# Patient Record
Sex: FEMALE | Race: WHITE | Employment: OTHER | URBAN - METROPOLITAN AREA
[De-identification: names, ages, dates, MRNs, and addresses within clinical notes are randomized per-mention and may not be internally consistent; named-entity substitution may affect disease eponyms.]

---

## 2017-01-24 ENCOUNTER — OFFICE VISIT (OUTPATIENT)
Dept: HEMATOLOGY/ONCOLOGY | Facility: HOSPITAL | Age: 67
End: 2017-01-24
Attending: INTERNAL MEDICINE
Payer: MEDICARE

## 2017-01-24 VITALS
HEART RATE: 61 BPM | BODY MASS INDEX: 21.26 KG/M2 | OXYGEN SATURATION: 96 % | WEIGHT: 117 LBS | RESPIRATION RATE: 18 BRPM | TEMPERATURE: 98 F | HEIGHT: 62.01 IN | DIASTOLIC BLOOD PRESSURE: 66 MMHG | SYSTOLIC BLOOD PRESSURE: 140 MMHG

## 2017-01-24 DIAGNOSIS — R76.8 ELEVATED SERUM IMMUNOGLOBULIN FREE LIGHT CHAINS: ICD-10-CM

## 2017-01-24 DIAGNOSIS — D69.6 THROMBOCYTOPENIA (HCC): Primary | ICD-10-CM

## 2017-01-24 DIAGNOSIS — R76.8 ELEVATED SERUM IMMUNOGLOBULIN FREE LIGHT CHAIN LEVEL: ICD-10-CM

## 2017-01-24 DIAGNOSIS — D72.819 LEUKOPENIA, UNSPECIFIED TYPE: ICD-10-CM

## 2017-01-24 LAB
ALBUMIN SERPL-MCNC: 3.9 G/DL (ref 3.5–4.8)
ALP LIVER SERPL-CCNC: 98 U/L (ref 55–142)
ALT SERPL-CCNC: 34 U/L (ref 14–54)
AST SERPL-CCNC: 46 U/L (ref 15–41)
BASOPHILS # BLD AUTO: 0.01 X10(3) UL (ref 0–0.1)
BASOPHILS NFR BLD AUTO: 0.5 %
BILIRUB SERPL-MCNC: 1.4 MG/DL (ref 0.1–2)
BUN BLD-MCNC: 14 MG/DL (ref 8–20)
CALCIUM BLD-MCNC: 10.2 MG/DL (ref 8.3–10.3)
CHLORIDE: 102 MMOL/L (ref 101–111)
CO2: 33 MMOL/L (ref 22–32)
CREAT BLD-MCNC: 1.06 MG/DL (ref 0.55–1.02)
EOSINOPHIL # BLD AUTO: 0.06 X10(3) UL (ref 0–0.3)
EOSINOPHIL NFR BLD AUTO: 2.7 %
ERYTHROCYTE [DISTWIDTH] IN BLOOD BY AUTOMATED COUNT: 11.8 % (ref 11.5–16)
GLUCOSE BLD-MCNC: 91 MG/DL (ref 70–99)
HCT VFR BLD AUTO: 39.6 % (ref 34–50)
HGB BLD-MCNC: 13.9 G/DL (ref 12–16)
IMMATURE GRANULOCYTE COUNT: 0.01 X10(3) UL (ref 0–1)
IMMATURE GRANULOCYTE RATIO %: 0.5 %
LYMPHOCYTES # BLD AUTO: 0.63 X10(3) UL (ref 0.9–4)
LYMPHOCYTES NFR BLD AUTO: 28.5 %
M PROTEIN MFR SERPL ELPH: 8.4 G/DL (ref 6.1–8.3)
MCH RBC QN AUTO: 33.5 PG (ref 27–33.2)
MCHC RBC AUTO-ENTMCNC: 35.1 G/DL (ref 31–37)
MCV RBC AUTO: 95.4 FL (ref 81–100)
MONOCYTES # BLD AUTO: 0.17 X10(3) UL (ref 0.1–0.6)
MONOCYTES NFR BLD AUTO: 7.7 %
NEUTROPHIL ABS PRELIM: 1.33 X10 (3) UL (ref 1.3–6.7)
NEUTROPHILS # BLD AUTO: 1.33 X10(3) UL (ref 1.3–6.7)
NEUTROPHILS NFR BLD AUTO: 60.1 %
PLATELET # BLD AUTO: 111 10(3)UL (ref 150–450)
POTASSIUM SERPL-SCNC: 4 MMOL/L (ref 3.6–5.1)
RBC # BLD AUTO: 4.15 X10(6)UL (ref 3.8–5.1)
RED CELL DISTRIBUTION WIDTH-SD: 40.7 FL (ref 35.1–46.3)
SODIUM SERPL-SCNC: 139 MMOL/L (ref 136–144)
WBC # BLD AUTO: 2.2 X10(3) UL (ref 4–13)

## 2017-01-24 PROCEDURE — 99213 OFFICE O/P EST LOW 20 MIN: CPT | Performed by: INTERNAL MEDICINE

## 2017-01-26 LAB
A/G RATIO: 1.21
ALBUMIN, SERUM: 4.54 G/DL (ref 3.5–4.8)
ALPHA-1 GLOBULIN: 0.22 G/DL (ref 0.1–0.3)
ALPHA-2 GLOBULIN: 0.76 G/DL (ref 0.6–1)
BETA GLOBULIN: 0.71 G/DL (ref 0.7–1.2)
GAMMA GLOBULIN: 2.08 G/DL (ref 0.6–1.6)
KAPPA FREE LIGHT CHAIN: 5.93 MG/DL (ref 0.33–1.94)
KAPPA/LAMBDA FLC RATIO: 1.75 (ref 0.26–1.65)
LAMBDA FREE LIGHT CHAIN: 3.39 MG/DL (ref 0.57–2.63)
TOTAL PROTEIN,SERUM: 8.3 G/DL (ref 6.1–8.3)

## 2017-01-30 NOTE — PROGRESS NOTES
Cancer Center Progress Note  Patient Name: Novella Curling   YOB: 1950   Medical Record Number: [id number]     Attending Physician: Michael Bueno M.D. Date of Visit: 1/24/17     Chief Complaint:  Patient presents with:   Follow COLONOSCOPY        , 63,  0       Family History:  Family History   Problem Relation Age of Onset   • heart disease[other] [OTHER] Father    • htn[other] [OTHER] Father    • cabg[other] [OTHER] Father    • Heart Disorder Father    • Hypertensi every 6 (six) hours as needed for Pain., Disp: , Rfl:     Allergies:  No Known Allergies     Review of Systems:    Constitutional No fevers, chills, night sweats, excessive fatigue or weight loss. Eyes No significant visual difficulties. No diplopia.  No of our discussions today.      Laboratory:    Recent Labs   01/24/17  1425   RBC 4.15   HGB 13.9   HCT 39.6   MCV 95.4   MCH 33.5 H   MCHC 35.1   RDW 11.8   NEPRELIM 1.33   WBC 2.2 L   .0 L       Recent Labs   01/24/17  1425   GLU 91   BUN 14   CREAT

## 2017-02-25 ENCOUNTER — LAB ENCOUNTER (OUTPATIENT)
Dept: LAB | Facility: HOSPITAL | Age: 67
End: 2017-02-25
Attending: INTERNAL MEDICINE
Payer: MEDICARE

## 2017-02-25 DIAGNOSIS — R76.8 ELEVATED SERUM IMMUNOGLOBULIN FREE LIGHT CHAINS: ICD-10-CM

## 2017-02-25 DIAGNOSIS — D72.819 LEUKOPENIA, UNSPECIFIED TYPE: ICD-10-CM

## 2017-02-25 DIAGNOSIS — R76.8 ELEVATED SERUM IMMUNOGLOBULIN FREE LIGHT CHAIN LEVEL: ICD-10-CM

## 2017-02-25 DIAGNOSIS — D69.6 THROMBOCYTOPENIA (HCC): ICD-10-CM

## 2017-02-25 LAB
ALBUMIN SERPL-MCNC: 3.9 G/DL (ref 3.5–4.8)
ALP LIVER SERPL-CCNC: 111 U/L (ref 55–142)
ALT SERPL-CCNC: 38 U/L (ref 14–54)
AST SERPL-CCNC: 50 U/L (ref 15–41)
BASOPHILS # BLD AUTO: 0.02 X10(3) UL (ref 0–0.1)
BASOPHILS NFR BLD AUTO: 0.9 %
BILIRUB SERPL-MCNC: 1.2 MG/DL (ref 0.1–2)
BUN BLD-MCNC: 10 MG/DL (ref 8–20)
CALCIUM BLD-MCNC: 9.2 MG/DL (ref 8.3–10.3)
CHLORIDE: 104 MMOL/L (ref 101–111)
CO2: 29 MMOL/L (ref 22–32)
CREAT BLD-MCNC: 0.98 MG/DL (ref 0.55–1.02)
EOSINOPHIL # BLD AUTO: 0.08 X10(3) UL (ref 0–0.3)
EOSINOPHIL NFR BLD AUTO: 3.8 %
ERYTHROCYTE [DISTWIDTH] IN BLOOD BY AUTOMATED COUNT: 11.9 % (ref 11.5–16)
GLUCOSE BLD-MCNC: 78 MG/DL (ref 70–99)
HCT VFR BLD AUTO: 40.4 % (ref 34–50)
HGB BLD-MCNC: 14.1 G/DL (ref 12–16)
IMMATURE GRANULOCYTE COUNT: 0.01 X10(3) UL (ref 0–1)
IMMATURE GRANULOCYTE RATIO %: 0.5 %
IMMUNOGLOBULIN A: 527 MG/DL (ref 70–312)
IMMUNOGLOBULIN G: 1860 MG/DL (ref 791–1643)
IMMUNOGLOBULIN M: 277 MG/DL (ref 43–279)
LYMPHOCYTES # BLD AUTO: 0.63 X10(3) UL (ref 0.9–4)
LYMPHOCYTES NFR BLD AUTO: 29.9 %
M PROTEIN MFR SERPL ELPH: 8.4 G/DL (ref 6.1–8.3)
MCH RBC QN AUTO: 33.9 PG (ref 27–33.2)
MCHC RBC AUTO-ENTMCNC: 34.9 G/DL (ref 31–37)
MCV RBC AUTO: 97.1 FL (ref 81–100)
MONOCYTES # BLD AUTO: 0.23 X10(3) UL (ref 0.1–0.6)
MONOCYTES NFR BLD AUTO: 10.9 %
NEUTROPHIL ABS PRELIM: 1.14 X10 (3) UL (ref 1.3–6.7)
NEUTROPHILS # BLD AUTO: 1.14 X10(3) UL (ref 1.3–6.7)
NEUTROPHILS NFR BLD AUTO: 54 %
PLATELET # BLD AUTO: 106 10(3)UL (ref 150–450)
POTASSIUM SERPL-SCNC: 3.8 MMOL/L (ref 3.6–5.1)
RBC # BLD AUTO: 4.16 X10(6)UL (ref 3.8–5.1)
RED CELL DISTRIBUTION WIDTH-SD: 42.2 FL (ref 35.1–46.3)
SODIUM SERPL-SCNC: 139 MMOL/L (ref 136–144)
WBC # BLD AUTO: 2.1 X10(3) UL (ref 4–13)

## 2017-02-25 PROCEDURE — 83883 ASSAY NEPHELOMETRY NOT SPEC: CPT

## 2017-02-25 PROCEDURE — 36415 COLL VENOUS BLD VENIPUNCTURE: CPT

## 2017-02-25 PROCEDURE — 86334 IMMUNOFIX E-PHORESIS SERUM: CPT

## 2017-02-25 PROCEDURE — 84165 PROTEIN E-PHORESIS SERUM: CPT

## 2017-02-25 PROCEDURE — 82784 ASSAY IGA/IGD/IGG/IGM EACH: CPT

## 2017-02-25 PROCEDURE — 85025 COMPLETE CBC W/AUTO DIFF WBC: CPT

## 2017-02-25 PROCEDURE — 80053 COMPREHEN METABOLIC PANEL: CPT

## 2017-03-02 LAB
A/G RATIO: 1.3
ALBUMIN, SERUM: 4.46 G/DL (ref 3.5–4.8)
ALPHA-1 GLOBULIN: 0.17 G/DL (ref 0.1–0.3)
ALPHA-2 GLOBULIN: 0.78 G/DL (ref 0.6–1)
BETA GLOBULIN: 0.55 G/DL (ref 0.7–1.2)
GAMMA GLOBULIN: 1.93 G/DL (ref 0.6–1.6)
KAPPA FREE LIGHT CHAIN: 7.16 MG/DL (ref 0.33–1.94)
KAPPA/LAMBDA FLC RATIO: 1.73 (ref 0.26–1.65)
LAMBDA FREE LIGHT CHAIN: 4.15 MG/DL (ref 0.57–2.63)
TOTAL PROTEIN,SERUM: 7.9 G/DL (ref 6.1–8.3)

## 2017-03-09 ENCOUNTER — LAB ENCOUNTER (OUTPATIENT)
Dept: LAB | Facility: HOSPITAL | Age: 67
End: 2017-03-09
Attending: INTERNAL MEDICINE
Payer: MEDICARE

## 2017-03-09 DIAGNOSIS — M81.0 OSTEOPOROSIS: Primary | ICD-10-CM

## 2017-03-09 LAB
25-HYDROXYVITAMIN D (TOTAL): 65.4 NG/ML (ref 30–100)
AFP TUMOR MARKER: 8.6 NG/ML (ref ?–8)
INR BLD: 1.12 (ref 0.89–1.11)
PSA SERPL DL<=0.01 NG/ML-MCNC: 14.5 SECONDS (ref 12–14.3)

## 2017-03-09 PROCEDURE — 36415 COLL VENOUS BLD VENIPUNCTURE: CPT

## 2017-03-09 PROCEDURE — 85610 PROTHROMBIN TIME: CPT

## 2017-03-09 PROCEDURE — 82306 VITAMIN D 25 HYDROXY: CPT

## 2017-03-09 PROCEDURE — 82105 ALPHA-FETOPROTEIN SERUM: CPT

## 2017-03-28 ENCOUNTER — TELEPHONE (OUTPATIENT)
Dept: INTERNAL MEDICINE CLINIC | Facility: CLINIC | Age: 67
End: 2017-03-28

## 2017-03-30 ENCOUNTER — HOSPITAL ENCOUNTER (OUTPATIENT)
Dept: GENERAL RADIOLOGY | Age: 67
Discharge: HOME | End: 2017-03-30
Attending: INTERNAL MEDICINE
Payer: MEDICARE

## 2017-03-30 ENCOUNTER — OFFICE VISIT (OUTPATIENT)
Dept: INTERNAL MEDICINE CLINIC | Facility: CLINIC | Age: 67
End: 2017-03-30

## 2017-03-30 VITALS
SYSTOLIC BLOOD PRESSURE: 126 MMHG | WEIGHT: 115.81 LBS | RESPIRATION RATE: 16 BRPM | BODY MASS INDEX: 22.44 KG/M2 | DIASTOLIC BLOOD PRESSURE: 74 MMHG | HEART RATE: 71 BPM | OXYGEN SATURATION: 99 % | HEIGHT: 60.25 IN | TEMPERATURE: 98 F

## 2017-03-30 DIAGNOSIS — M25.511 ACUTE PAIN OF RIGHT SHOULDER: Primary | ICD-10-CM

## 2017-03-30 DIAGNOSIS — Z00.00 ENCOUNTER FOR ANNUAL HEALTH EXAMINATION: ICD-10-CM

## 2017-03-30 DIAGNOSIS — Z23 NEED FOR VACCINATION: ICD-10-CM

## 2017-03-30 DIAGNOSIS — H53.8 BLURRED VISION: ICD-10-CM

## 2017-03-30 DIAGNOSIS — M25.511 ACUTE PAIN OF RIGHT SHOULDER: ICD-10-CM

## 2017-03-30 DIAGNOSIS — Z11.59 NEED FOR HEPATITIS C SCREENING TEST: ICD-10-CM

## 2017-03-30 DIAGNOSIS — Z13.6 SCREENING FOR CARDIOVASCULAR CONDITION: ICD-10-CM

## 2017-03-30 DIAGNOSIS — Z12.31 VISIT FOR SCREENING MAMMOGRAM: ICD-10-CM

## 2017-03-30 PROCEDURE — G0402 INITIAL PREVENTIVE EXAM: HCPCS | Performed by: INTERNAL MEDICINE

## 2017-03-30 PROCEDURE — 90670 PCV13 VACCINE IM: CPT | Performed by: INTERNAL MEDICINE

## 2017-03-30 PROCEDURE — 73030 X-RAY EXAM OF SHOULDER: CPT

## 2017-03-30 PROCEDURE — G0009 ADMIN PNEUMOCOCCAL VACCINE: HCPCS | Performed by: INTERNAL MEDICINE

## 2017-03-30 PROCEDURE — 99213 OFFICE O/P EST LOW 20 MIN: CPT | Performed by: INTERNAL MEDICINE

## 2017-03-30 NOTE — PATIENT INSTRUCTIONS
Barbara Phoenix's SCREENING SCHEDULE   Tests on this list are recommended by your physician but may not be covered, or covered at this frequency, by your insurer. Please check with your insurance carrier before scheduling to verify coverage.    AAKASH lifetime   • Anyone with a family history    Colorectal Cancer Screening  Covered up to Age 76     Colonoscopy Screen   Covered every 10 years- more often if abnormal Colonoscopy,10 Years due on 02/16/2020 Update Health Maintenance if applicable    Flex Si Pneumococcal 13 (Prevnar)  Covered Once after 65 No orders found for this or any previous visit. today Please get once after your 65th birthday    Pneumococcal 23 (Pneumovax)  Covered Once after 65 No orders found for this or any previous visit. next rachael

## 2017-04-04 ENCOUNTER — HOSPITAL ENCOUNTER (OUTPATIENT)
Dept: PHYSICAL THERAPY | Facility: HOSPITAL | Age: 67
Setting detail: THERAPIES SERIES
Discharge: HOME | End: 2017-04-04
Attending: INTERNAL MEDICINE
Payer: MEDICARE

## 2017-04-04 DIAGNOSIS — M25.511 ACUTE PAIN OF RIGHT SHOULDER: Primary | ICD-10-CM

## 2017-04-04 PROCEDURE — 97110 THERAPEUTIC EXERCISES: CPT

## 2017-04-04 PROCEDURE — 97162 PT EVAL MOD COMPLEX 30 MIN: CPT

## 2017-04-04 NOTE — PROGRESS NOTES
UPPER EXTREMITY EVALUATION:   Referring Physician: Dr. Jonathan Rider  Diagnosis: R shoulder pain     Date of Service: 4/4/2017     PATIENT SUMMARY   Carron Hatchet is a 74year old y/o female who presents to therapy today with complaints of R shoulder pain.   Courtney to palpation anterior R shoulder  Sensation: SILT    AROM:   Shoulder    Flexion: R 128; L 160  Abduction: R 140; L 170  ER: R 45; L 75  IR: 17 cm difference reaching behind back      Accessory motion: R shoulder hypomobile to posterior and inferior glide this course of care. Thank you for your referral. Please co-sign or sign and return this letter via fax as soon as possible to [phone].  If you have any questions, please contact me at Dept: [phone]    Sincerely,  Electronically signed by thera

## 2017-04-06 ENCOUNTER — HOSPITAL ENCOUNTER (OUTPATIENT)
Dept: PHYSICAL THERAPY | Facility: HOSPITAL | Age: 67
Setting detail: THERAPIES SERIES
Discharge: HOME | End: 2017-04-06
Attending: INTERNAL MEDICINE
Payer: MEDICARE

## 2017-04-06 ENCOUNTER — APPOINTMENT (OUTPATIENT)
Dept: LAB | Facility: HOSPITAL | Age: 67
End: 2017-04-06
Attending: INTERNAL MEDICINE
Payer: MEDICARE

## 2017-04-06 DIAGNOSIS — Z11.59 NEED FOR HEPATITIS C SCREENING TEST: ICD-10-CM

## 2017-04-06 DIAGNOSIS — Z13.6 SCREENING FOR CARDIOVASCULAR CONDITION: ICD-10-CM

## 2017-04-06 PROCEDURE — 36415 COLL VENOUS BLD VENIPUNCTURE: CPT

## 2017-04-06 PROCEDURE — 86803 HEPATITIS C AB TEST: CPT

## 2017-04-06 PROCEDURE — 97140 MANUAL THERAPY 1/> REGIONS: CPT

## 2017-04-06 PROCEDURE — 80061 LIPID PANEL: CPT

## 2017-04-06 PROCEDURE — 97110 THERAPEUTIC EXERCISES: CPT

## 2017-04-06 NOTE — PROGRESS NOTES
Dx: R shoulder pain         Authorized # of Visits:           Next MD visit: none scheduled  Fall Risk: standard         Precautions: n/a             Subjective: Patient reports she had a painful incident on Tuesday when she reached laterally in the car an

## 2017-04-10 ENCOUNTER — TELEPHONE (OUTPATIENT)
Dept: INTERNAL MEDICINE CLINIC | Facility: CLINIC | Age: 67
End: 2017-04-10

## 2017-04-10 NOTE — TELEPHONE ENCOUNTER
Incoming (mail or fax):  fax  Received from:  Eber Omalley  Documentation given to:  Dr Miranda Suggs

## 2017-04-11 ENCOUNTER — HOSPITAL ENCOUNTER (OUTPATIENT)
Dept: PHYSICAL THERAPY | Facility: HOSPITAL | Age: 67
Setting detail: THERAPIES SERIES
Discharge: HOME | End: 2017-04-11
Attending: INTERNAL MEDICINE
Payer: MEDICARE

## 2017-04-11 PROCEDURE — 97140 MANUAL THERAPY 1/> REGIONS: CPT

## 2017-04-11 PROCEDURE — 97110 THERAPEUTIC EXERCISES: CPT

## 2017-04-11 NOTE — PROGRESS NOTES
Dx: R shoulder pain         Authorized # of Visits:           Next MD visit: none scheduled  Fall Risk: standard         Precautions: n/a             Subjective: Patient 8' late for appointment.   Patient reports some slight improvement in her symptoms bot x20  SB circles on wall 2x30 sec each way                                   Skilled Services: HEP in bold.     Charges: manual x1, therex x1       Total Timed Treatment: 35 min  Total Treatment Time: 35 min

## 2017-04-13 ENCOUNTER — HOSPITAL ENCOUNTER (OUTPATIENT)
Dept: PHYSICAL THERAPY | Facility: HOSPITAL | Age: 67
Setting detail: THERAPIES SERIES
Discharge: HOME | End: 2017-04-13
Attending: INTERNAL MEDICINE
Payer: MEDICARE

## 2017-04-13 PROCEDURE — 97140 MANUAL THERAPY 1/> REGIONS: CPT

## 2017-04-13 PROCEDURE — 97110 THERAPEUTIC EXERCISES: CPT

## 2017-04-13 NOTE — PROGRESS NOTES
Dx: R shoulder pain         Authorized # of Visits:           Next MD visit: none scheduled  Fall Risk: standard         Precautions: n/a             Subjective: Patient reports no major change.     Objective:   Pain located in R anterior shoulder  Trigger retraction and BL shoulder extensions 3x20 each Red tband ex: scapular retraction and BL shoulder extensions 3x20 each Continued at home        SB roll up wall and stretch x20  SB circles on wall 2x30 sec each way SB roll up wall and stretch x20  SB Platinum

## 2017-04-17 ENCOUNTER — TELEPHONE (OUTPATIENT)
Dept: INTERNAL MEDICINE CLINIC | Facility: CLINIC | Age: 67
End: 2017-04-17

## 2017-04-17 NOTE — TELEPHONE ENCOUNTER
Incoming (mail or fax):  fax  Received from:  Mariela Mistry  Documentation given to:  Dr Marcus Esteban

## 2017-04-18 ENCOUNTER — HOSPITAL ENCOUNTER (OUTPATIENT)
Dept: PHYSICAL THERAPY | Facility: HOSPITAL | Age: 67
Setting detail: THERAPIES SERIES
Discharge: HOME | End: 2017-04-18
Attending: INTERNAL MEDICINE
Payer: MEDICARE

## 2017-04-18 PROCEDURE — 97110 THERAPEUTIC EXERCISES: CPT

## 2017-04-18 PROCEDURE — 97140 MANUAL THERAPY 1/> REGIONS: CPT

## 2017-04-18 NOTE — PROGRESS NOTES
Dx: R shoulder pain         Authorized # of Visits:           Next MD visit: none scheduled  Fall Risk: standard         Precautions: n/a             Subjective: Patient reports she can tell her shoulder is improving, has less aching and range is improving Serratus press x20 Supine AAROM cane flexion and abduction x30 each  Cane Serratus press x20 Supine AAROM cane flexion x30  Cane Serratus press x30      Red tband ex: scapular retraction and BL shoulder extensions 3x20 each Red tband ex: scapular retractio

## 2017-04-21 ENCOUNTER — APPOINTMENT (OUTPATIENT)
Dept: PHYSICAL THERAPY | Facility: HOSPITAL | Age: 67
End: 2017-04-21
Attending: INTERNAL MEDICINE
Payer: MEDICARE

## 2017-04-25 ENCOUNTER — HOSPITAL ENCOUNTER (OUTPATIENT)
Dept: PHYSICAL THERAPY | Facility: HOSPITAL | Age: 67
Setting detail: THERAPIES SERIES
Discharge: HOME | End: 2017-04-25
Attending: INTERNAL MEDICINE
Payer: MEDICARE

## 2017-04-25 PROCEDURE — 97110 THERAPEUTIC EXERCISES: CPT

## 2017-04-25 PROCEDURE — 97140 MANUAL THERAPY 1/> REGIONS: CPT

## 2017-04-25 NOTE — PROGRESS NOTES
Dx: R shoulder pain         Authorized # of Visits:           Next MD visit: none scheduled  Fall Risk: standard         Precautions: n/a             Subjective: Patient reports she does still have pain with reaching out to the side, but reaching overhead grade III  GH distraction   x15' total  Manual thoracic P-A grade II-III T2-6     Supine AAROM cane flexion and abduction x20 each Supine AAROM cane flexion and abduction x30 each  Cane Serratus press x20 Supine AAROM cane flexion and abduction x30 each  C

## 2017-04-26 ENCOUNTER — HOSPITAL ENCOUNTER (OUTPATIENT)
Dept: MRI IMAGING | Facility: HOSPITAL | Age: 67
Discharge: HOME | End: 2017-04-26
Attending: INTERNAL MEDICINE
Payer: MEDICARE

## 2017-04-26 DIAGNOSIS — K74.3 PRIMARY BILIARY CIRRHOSIS (HCC): ICD-10-CM

## 2017-04-26 DIAGNOSIS — R77.2 ELEVATED AFP: ICD-10-CM

## 2017-04-26 PROCEDURE — A9581 GADOXETATE DISODIUM INJ: HCPCS | Performed by: INTERNAL MEDICINE

## 2017-04-26 PROCEDURE — 74183 MRI ABD W/O CNTR FLWD CNTR: CPT

## 2017-04-28 ENCOUNTER — HOSPITAL ENCOUNTER (OUTPATIENT)
Dept: PHYSICAL THERAPY | Facility: HOSPITAL | Age: 67
Setting detail: THERAPIES SERIES
Discharge: HOME | End: 2017-04-28
Attending: INTERNAL MEDICINE
Payer: MEDICARE

## 2017-04-28 PROCEDURE — 97140 MANUAL THERAPY 1/> REGIONS: CPT

## 2017-04-28 PROCEDURE — 97110 THERAPEUTIC EXERCISES: CPT

## 2017-04-28 NOTE — PROGRESS NOTES
Dx: R shoulder pain         Authorized # of Visits:           Next MD visit: none scheduled  Fall Risk: standard         Precautions: n/a             Subjective: Patient reports she does still have pain with reaching out to the side, but reaching overhead total Manual posterior and inferior GH glide grade III  GH distraction   x15' total  Manual thoracic P-A grade II-III T2-6 Manual posterior and inferior GH glide grade III  GH distraction   x15' total    Supine AAROM cane flexion and abduction x20 each Sup

## 2017-05-01 ENCOUNTER — TELEPHONE (OUTPATIENT)
Dept: INTERNAL MEDICINE CLINIC | Facility: CLINIC | Age: 67
End: 2017-05-01

## 2017-05-01 NOTE — TELEPHONE ENCOUNTER
Incoming (mail or fax):  fax  Received from:  Vida Durbin  Documentation given to:  Dr Justina Wynne

## 2017-05-02 ENCOUNTER — HOSPITAL ENCOUNTER (OUTPATIENT)
Dept: PHYSICAL THERAPY | Facility: HOSPITAL | Age: 67
Setting detail: THERAPIES SERIES
Discharge: HOME | End: 2017-05-02
Attending: PHYSICIAN ASSISTANT
Payer: MEDICARE

## 2017-05-02 PROCEDURE — 97110 THERAPEUTIC EXERCISES: CPT

## 2017-05-02 PROCEDURE — 97140 MANUAL THERAPY 1/> REGIONS: CPT

## 2017-05-02 NOTE — PROGRESS NOTES
Dx: R shoulder pain         Authorized # of Visits:           Next MD visit: none scheduled  Fall Risk: standard         Precautions: n/a             Subjective: Patient reports she is feeling much better and noticing much less pain than she used to have. total Manual posterior and inferior GH glide grade III  GH distraction   x15' total Manual posterior and inferior GH glide grade III  GH distraction   x15' total  Manual thoracic P-A grade II-III T2-6 Manual posterior and inferior GH glide grade III  [address] di bold.    Charges: manual x1, therex x1       Total Timed Treatment: 33 min  Total Treatment Time: 33 min

## 2017-05-08 ENCOUNTER — APPOINTMENT (OUTPATIENT)
Dept: PHYSICAL THERAPY | Facility: HOSPITAL | Age: 67
End: 2017-05-08
Payer: MEDICARE

## 2017-05-10 ENCOUNTER — TELEPHONE (OUTPATIENT)
Dept: INTERNAL MEDICINE CLINIC | Facility: CLINIC | Age: 67
End: 2017-05-10

## 2017-05-10 NOTE — TELEPHONE ENCOUNTER
Incoming (mail or fax):  fax  Received from:  Kyung December  Documentation given to:  Dr Shamir Cavanaugh

## 2017-05-11 ENCOUNTER — APPOINTMENT (OUTPATIENT)
Dept: PHYSICAL THERAPY | Facility: HOSPITAL | Age: 67
End: 2017-05-11
Payer: MEDICARE

## 2017-05-15 ENCOUNTER — HOSPITAL ENCOUNTER (OUTPATIENT)
Dept: PHYSICAL THERAPY | Facility: HOSPITAL | Age: 67
Setting detail: THERAPIES SERIES
Discharge: HOME | End: 2017-05-15
Attending: INTERNAL MEDICINE
Payer: MEDICARE

## 2017-05-15 PROCEDURE — 97110 THERAPEUTIC EXERCISES: CPT

## 2017-05-15 PROCEDURE — 97140 MANUAL THERAPY 1/> REGIONS: CPT

## 2017-05-15 NOTE — PROGRESS NOTES
Dx: R shoulder pain         Authorized # of Visits:           Next MD visit: none scheduled  Fall Risk: standard         Precautions: n/a             Subjective: Patient reports she is feeling better daing her exercises, but still notices an achey sensatio Continued at home Continued at home Continued at home   Manual posterior and inferior GH glide grade III  STM rotator cuff muscles and anterior deltoid x15' total Manual posterior and inferior GH glide grade III  STM rotator cuff muscles and anterior delto Continued at home Continued at home     Seated AROM shoulder flexion and abduction on pulley x2' each Seated AROM shoulder flexion and abduction on pulley x2' each Seated AROM shoulder flexion and abduction on pulley x2' each  Seated AROM shoulder flexion

## 2017-05-22 ENCOUNTER — HOSPITAL ENCOUNTER (OUTPATIENT)
Dept: PHYSICAL THERAPY | Facility: HOSPITAL | Age: 67
Setting detail: THERAPIES SERIES
Discharge: HOME | End: 2017-05-22
Attending: INTERNAL MEDICINE
Payer: MEDICARE

## 2017-05-22 PROCEDURE — 97140 MANUAL THERAPY 1/> REGIONS: CPT

## 2017-05-22 PROCEDURE — 97110 THERAPEUTIC EXERCISES: CPT

## 2017-05-22 NOTE — PROGRESS NOTES
Dx: R shoulder pain         Authorized # of Visits:           Next MD visit: none scheduled  Fall Risk: standard         Precautions: n/a             Subjective: Patient reports she is feeling better doing her exercises, but still notices an achey sensatio home Continued at home   Manual posterior and inferior GH glide grade III  STM rotator cuff muscles and anterior deltoid x15' total Manual posterior and inferior GH glide grade III  STM rotator cuff muscles and anterior deltoid x15' total Manual posterior x20      R Lower trap lift offs 2x20 R Lower trap lift offs 2x20 R Lower trap lift offs 2x20 R lower trap lift offs 2x20 Continued at home Continued at home Continued at home     Seated AROM shoulder flexion and abduction on pulley x2' each Seated AROM mihir

## 2017-05-23 ENCOUNTER — HOSPITAL ENCOUNTER (OUTPATIENT)
Dept: MAMMOGRAPHY | Age: 67
Discharge: HOME | End: 2017-05-23
Attending: INTERNAL MEDICINE
Payer: MEDICARE

## 2017-05-23 DIAGNOSIS — Z12.31 VISIT FOR SCREENING MAMMOGRAM: ICD-10-CM

## 2017-05-23 PROCEDURE — 77067 SCR MAMMO BI INCL CAD: CPT | Performed by: INTERNAL MEDICINE

## 2017-06-07 ENCOUNTER — LAB ENCOUNTER (OUTPATIENT)
Dept: LAB | Facility: HOSPITAL | Age: 67
End: 2017-06-07
Attending: INTERNAL MEDICINE
Payer: MEDICARE

## 2017-06-07 DIAGNOSIS — K74.3 PRIMARY BILIARY CIRRHOSIS (HCC): ICD-10-CM

## 2017-06-07 DIAGNOSIS — R77.2 ELEVATED AFP: ICD-10-CM

## 2017-06-07 PROCEDURE — 80053 COMPREHEN METABOLIC PANEL: CPT

## 2017-06-07 PROCEDURE — 82105 ALPHA-FETOPROTEIN SERUM: CPT

## 2017-06-07 PROCEDURE — 85025 COMPLETE CBC W/AUTO DIFF WBC: CPT

## 2017-06-07 PROCEDURE — 36415 COLL VENOUS BLD VENIPUNCTURE: CPT

## 2017-06-20 ENCOUNTER — TELEPHONE (OUTPATIENT)
Dept: INTERNAL MEDICINE CLINIC | Facility: CLINIC | Age: 67
End: 2017-06-20

## 2017-06-20 NOTE — TELEPHONE ENCOUNTER
Incoming (mail or fax):  fax  Received from:  Dr. Liana Kendrick  Documentation given to:  Dr. Miranda Suggs

## 2017-07-25 ENCOUNTER — APPOINTMENT (OUTPATIENT)
Dept: HEMATOLOGY/ONCOLOGY | Facility: HOSPITAL | Age: 67
End: 2017-07-25
Attending: INTERNAL MEDICINE
Payer: MEDICARE

## 2017-08-01 ENCOUNTER — OFFICE VISIT (OUTPATIENT)
Dept: HEMATOLOGY/ONCOLOGY | Facility: HOSPITAL | Age: 67
End: 2017-08-01
Attending: INTERNAL MEDICINE
Payer: MEDICARE

## 2017-08-01 VITALS
OXYGEN SATURATION: 98 % | HEIGHT: 62.01 IN | TEMPERATURE: 98 F | RESPIRATION RATE: 18 BRPM | DIASTOLIC BLOOD PRESSURE: 65 MMHG | BODY MASS INDEX: 20.75 KG/M2 | SYSTOLIC BLOOD PRESSURE: 130 MMHG | HEART RATE: 69 BPM | WEIGHT: 114.19 LBS

## 2017-08-01 DIAGNOSIS — D47.2 MGUS (MONOCLONAL GAMMOPATHY OF UNKNOWN SIGNIFICANCE): ICD-10-CM

## 2017-08-01 DIAGNOSIS — D69.6 THROMBOCYTOPENIA (HCC): ICD-10-CM

## 2017-08-01 DIAGNOSIS — D72.819 LEUKOPENIA: Primary | ICD-10-CM

## 2017-08-01 LAB
ALBUMIN SERPL-MCNC: 3.8 G/DL (ref 3.5–4.8)
ALP LIVER SERPL-CCNC: 115 U/L (ref 55–142)
ALT SERPL-CCNC: 31 U/L (ref 14–54)
AST SERPL-CCNC: 47 U/L (ref 15–41)
BASOPHILS # BLD AUTO: 0.01 X10(3) UL (ref 0–0.1)
BASOPHILS NFR BLD AUTO: 0.4 %
BILIRUB SERPL-MCNC: 1.4 MG/DL (ref 0.1–2)
BUN BLD-MCNC: 13 MG/DL (ref 8–20)
CALCIUM BLD-MCNC: 9.8 MG/DL (ref 8.3–10.3)
CHLORIDE: 107 MMOL/L (ref 101–111)
CO2: 27 MMOL/L (ref 22–32)
CREAT BLD-MCNC: 0.99 MG/DL (ref 0.55–1.02)
EOSINOPHIL # BLD AUTO: 0.09 X10(3) UL (ref 0–0.3)
EOSINOPHIL NFR BLD AUTO: 3.9 %
ERYTHROCYTE [DISTWIDTH] IN BLOOD BY AUTOMATED COUNT: 11.9 % (ref 11.5–16)
GLUCOSE BLD-MCNC: 80 MG/DL (ref 70–99)
HCT VFR BLD AUTO: 37.5 % (ref 34–50)
HGB BLD-MCNC: 12.9 G/DL (ref 12–16)
IMMATURE GRANULOCYTE COUNT: 0.02 X10(3) UL (ref 0–1)
IMMATURE GRANULOCYTE RATIO %: 0.9 %
LYMPHOCYTES # BLD AUTO: 0.66 X10(3) UL (ref 0.9–4)
LYMPHOCYTES NFR BLD AUTO: 28.9 %
M PROTEIN MFR SERPL ELPH: 8.3 G/DL (ref 6.1–8.3)
MCH RBC QN AUTO: 32.5 PG (ref 27–33.2)
MCHC RBC AUTO-ENTMCNC: 34.4 G/DL (ref 31–37)
MCV RBC AUTO: 94.5 FL (ref 81–100)
MONOCYTES # BLD AUTO: 0.23 X10(3) UL (ref 0.1–0.6)
MONOCYTES NFR BLD AUTO: 10.1 %
NEUTROPHIL ABS PRELIM: 1.27 X10 (3) UL (ref 1.3–6.7)
NEUTROPHILS # BLD AUTO: 1.27 X10(3) UL (ref 1.3–6.7)
NEUTROPHILS NFR BLD AUTO: 55.8 %
PLATELET # BLD AUTO: 109 10(3)UL (ref 150–450)
POTASSIUM SERPL-SCNC: 3.9 MMOL/L (ref 3.6–5.1)
RBC # BLD AUTO: 3.97 X10(6)UL (ref 3.8–5.1)
RED CELL DISTRIBUTION WIDTH-SD: 41.4 FL (ref 35.1–46.3)
SODIUM SERPL-SCNC: 140 MMOL/L (ref 136–144)
WBC # BLD AUTO: 2.3 X10(3) UL (ref 4–13)

## 2017-08-01 PROCEDURE — 99213 OFFICE O/P EST LOW 20 MIN: CPT | Performed by: INTERNAL MEDICINE

## 2017-08-01 NOTE — PROGRESS NOTES
Cancer Center Progress Note  Patient Name: Cris Sadler   YOB: 1950   Medical Record Number: [id number]     Attending Physician: Yas Garnica M.D. Date of Visit: 8/1/2017     Chief Complaint:  Patient presents with:   Follow UPPER GI ENDOSCOPY,EXAM    Family History:  Family History   Problem Relation Age of Onset   • heart disease[other] [OTHER] Father    • htn[other] [OTHER] Father    • cabg[other] [OTHER] Father    • Heart Disorder Father    • Hypertension Father    • Heart 1.5 tablets by mouth daily. , Disp: , Rfl:     Allergies:  No Known Allergies     Review of Systems:    Constitutional No fevers, chills, night sweats, excessive fatigue or weight loss. Eyes No significant visual difficulties. No diplopia. No yellowing. Coherent speech. Verbalizes understanding of our discussions today.        Laboratory:    Recent Labs   08/01/17  1434   RBC 3.97   HGB 12.9   HCT 37.5   MCV 94.5   MCH 32.5   MCHC 34.4   RDW 11.9   NEPRELIM 1.27 L   WBC 2.3 L   .0 L       Recent Lab

## 2017-08-09 ENCOUNTER — APPOINTMENT (OUTPATIENT)
Dept: LAB | Facility: HOSPITAL | Age: 67
End: 2017-08-09
Attending: INTERNAL MEDICINE
Payer: MEDICARE

## 2017-08-09 DIAGNOSIS — D72.819 LEUKOPENIA: ICD-10-CM

## 2017-08-09 DIAGNOSIS — D47.2 MGUS (MONOCLONAL GAMMOPATHY OF UNKNOWN SIGNIFICANCE): ICD-10-CM

## 2017-08-09 LAB
IMMUNOGLOBULIN A: 482 MG/DL (ref 70–312)
IMMUNOGLOBULIN G: 1770 MG/DL (ref 791–1643)
IMMUNOGLOBULIN M: 264 MG/DL (ref 43–279)

## 2017-08-09 PROCEDURE — 84165 PROTEIN E-PHORESIS SERUM: CPT

## 2017-08-09 PROCEDURE — 36415 COLL VENOUS BLD VENIPUNCTURE: CPT

## 2017-08-09 PROCEDURE — 86334 IMMUNOFIX E-PHORESIS SERUM: CPT

## 2017-08-09 PROCEDURE — 82784 ASSAY IGA/IGD/IGG/IGM EACH: CPT

## 2017-08-09 PROCEDURE — 83883 ASSAY NEPHELOMETRY NOT SPEC: CPT

## 2017-08-11 LAB
A/G RATIO: 1.34
ALBUMIN, SERUM: 4.4 G/DL (ref 3.5–4.8)
ALPHA-1 GLOBULIN: 0.18 G/DL (ref 0.1–0.3)
ALPHA-2 GLOBULIN: 0.69 G/DL (ref 0.6–1)
BETA GLOBULIN: 0.65 G/DL (ref 0.7–1.2)
GAMMA GLOBULIN: 1.77 G/DL (ref 0.6–1.6)
KAPPA FREE LIGHT CHAIN: 6.18 MG/DL (ref 0.33–1.94)
KAPPA/LAMBDA FLC RATIO: 1.47 (ref 0.26–1.65)
LAMBDA FREE LIGHT CHAIN: 4.2 MG/DL (ref 0.57–2.63)
TOTAL PROTEIN,SERUM: 7.7 G/DL (ref 6.1–8.3)

## 2018-01-15 ENCOUNTER — LAB ENCOUNTER (OUTPATIENT)
Dept: LAB | Facility: HOSPITAL | Age: 68
End: 2018-01-15
Attending: INTERNAL MEDICINE
Payer: MEDICARE

## 2018-01-15 ENCOUNTER — HOSPITAL ENCOUNTER (OUTPATIENT)
Dept: MRI IMAGING | Facility: HOSPITAL | Age: 68
Discharge: HOME | End: 2018-01-15
Attending: INTERNAL MEDICINE
Payer: MEDICARE

## 2018-01-15 DIAGNOSIS — K74.3 PRIMARY BILIARY CIRRHOSIS (HCC): ICD-10-CM

## 2018-01-15 LAB
AFP TUMOR MARKER: 8.2 NG/ML (ref ?–8)
ALBUMIN SERPL-MCNC: 3.4 G/DL (ref 3.5–4.8)
ALP LIVER SERPL-CCNC: 146 U/L (ref 55–142)
ALT SERPL-CCNC: 58 U/L (ref 14–54)
AST SERPL-CCNC: 83 U/L (ref 15–41)
BASOPHILS # BLD AUTO: 0.01 X10(3) UL (ref 0–0.1)
BASOPHILS NFR BLD AUTO: 0.5 %
BILIRUB SERPL-MCNC: 1.8 MG/DL (ref 0.1–2)
BUN BLD-MCNC: 14 MG/DL (ref 8–20)
CALCIUM BLD-MCNC: 9.1 MG/DL (ref 8.3–10.3)
CHLORIDE: 107 MMOL/L (ref 101–111)
CO2: 27 MMOL/L (ref 22–32)
CREAT BLD-MCNC: 0.99 MG/DL (ref 0.55–1.02)
EOSINOPHIL # BLD AUTO: 0.11 X10(3) UL (ref 0–0.3)
EOSINOPHIL NFR BLD AUTO: 5 %
ERYTHROCYTE [DISTWIDTH] IN BLOOD BY AUTOMATED COUNT: 12.3 % (ref 11.5–16)
GLUCOSE BLD-MCNC: 86 MG/DL (ref 70–99)
HCT VFR BLD AUTO: 40.6 % (ref 34–50)
HGB BLD-MCNC: 13.8 G/DL (ref 12–16)
IMMATURE GRANULOCYTE COUNT: 0.01 X10(3) UL (ref 0–1)
IMMATURE GRANULOCYTE RATIO %: 0.5 %
LYMPHOCYTES # BLD AUTO: 0.63 X10(3) UL (ref 0.9–4)
LYMPHOCYTES NFR BLD AUTO: 28.6 %
M PROTEIN MFR SERPL ELPH: 8.2 G/DL (ref 6.1–8.3)
MCH RBC QN AUTO: 33.3 PG (ref 27–33.2)
MCHC RBC AUTO-ENTMCNC: 34 G/DL (ref 31–37)
MCV RBC AUTO: 98.1 FL (ref 81–100)
MONOCYTES # BLD AUTO: 0.21 X10(3) UL (ref 0.1–0.6)
MONOCYTES NFR BLD AUTO: 9.5 %
NEUTROPHIL ABS PRELIM: 1.23 X10 (3) UL (ref 1.3–6.7)
NEUTROPHILS # BLD AUTO: 1.23 X10(3) UL (ref 1.3–6.7)
NEUTROPHILS NFR BLD AUTO: 55.9 %
PLATELET # BLD AUTO: 122 10(3)UL (ref 150–450)
POTASSIUM SERPL-SCNC: 3.9 MMOL/L (ref 3.6–5.1)
RBC # BLD AUTO: 4.14 X10(6)UL (ref 3.8–5.1)
RED CELL DISTRIBUTION WIDTH-SD: 44.6 FL (ref 35.1–46.3)
SODIUM SERPL-SCNC: 142 MMOL/L (ref 136–144)
WBC # BLD AUTO: 2.2 X10(3) UL (ref 4–13)

## 2018-01-15 PROCEDURE — 80053 COMPREHEN METABOLIC PANEL: CPT

## 2018-01-15 PROCEDURE — 85025 COMPLETE CBC W/AUTO DIFF WBC: CPT

## 2018-01-15 PROCEDURE — A9581 GADOXETATE DISODIUM INJ: HCPCS | Performed by: INTERNAL MEDICINE

## 2018-01-15 PROCEDURE — 74183 MRI ABD W/O CNTR FLWD CNTR: CPT | Performed by: INTERNAL MEDICINE

## 2018-01-15 PROCEDURE — 82105 ALPHA-FETOPROTEIN SERUM: CPT

## 2018-01-15 PROCEDURE — 36415 COLL VENOUS BLD VENIPUNCTURE: CPT

## 2018-02-02 ENCOUNTER — OFFICE VISIT (OUTPATIENT)
Dept: HEMATOLOGY/ONCOLOGY | Facility: HOSPITAL | Age: 68
End: 2018-02-02
Attending: INTERNAL MEDICINE
Payer: MEDICARE

## 2018-02-02 VITALS
DIASTOLIC BLOOD PRESSURE: 67 MMHG | WEIGHT: 114 LBS | HEIGHT: 62.01 IN | SYSTOLIC BLOOD PRESSURE: 136 MMHG | BODY MASS INDEX: 20.71 KG/M2 | RESPIRATION RATE: 18 BRPM | OXYGEN SATURATION: 99 % | HEART RATE: 70 BPM | TEMPERATURE: 97 F

## 2018-02-02 DIAGNOSIS — D72.819 LEUKOPENIA: ICD-10-CM

## 2018-02-02 DIAGNOSIS — D47.2 MGUS (MONOCLONAL GAMMOPATHY OF UNKNOWN SIGNIFICANCE): ICD-10-CM

## 2018-02-02 LAB
ALBUMIN SERPL-MCNC: 3.8 G/DL (ref 3.5–4.8)
ALP LIVER SERPL-CCNC: 140 U/L (ref 55–142)
ALT SERPL-CCNC: 49 U/L (ref 14–54)
AST SERPL-CCNC: 77 U/L (ref 15–41)
BASOPHILS # BLD AUTO: 0.02 X10(3) UL (ref 0–0.1)
BASOPHILS NFR BLD AUTO: 0.6 %
BILIRUB SERPL-MCNC: 2 MG/DL (ref 0.1–2)
BUN BLD-MCNC: 19 MG/DL (ref 8–20)
CALCIUM BLD-MCNC: 9.1 MG/DL (ref 8.3–10.3)
CHLORIDE: 107 MMOL/L (ref 101–111)
CO2: 26 MMOL/L (ref 22–32)
CREAT BLD-MCNC: 0.98 MG/DL (ref 0.55–1.02)
EOSINOPHIL # BLD AUTO: 0.08 X10(3) UL (ref 0–0.3)
EOSINOPHIL NFR BLD AUTO: 2.5 %
ERYTHROCYTE [DISTWIDTH] IN BLOOD BY AUTOMATED COUNT: 12.4 % (ref 11.5–16)
GLUCOSE BLD-MCNC: 94 MG/DL (ref 70–99)
HCT VFR BLD AUTO: 36.6 % (ref 34–50)
HGB BLD-MCNC: 12.6 G/DL (ref 12–16)
IMMATURE GRANULOCYTE COUNT: 0 X10(3) UL (ref 0–1)
IMMATURE GRANULOCYTE RATIO %: 0 %
IMMUNOGLOBULIN A: 470 MG/DL (ref 70–312)
IMMUNOGLOBULIN G: 2200 MG/DL (ref 791–1643)
IMMUNOGLOBULIN M: 316 MG/DL (ref 43–279)
LYMPHOCYTES # BLD AUTO: 0.74 X10(3) UL (ref 0.9–4)
LYMPHOCYTES NFR BLD AUTO: 23.3 %
M PROTEIN MFR SERPL ELPH: 8.7 G/DL (ref 6.1–8.3)
MCH RBC QN AUTO: 33.1 PG (ref 27–33.2)
MCHC RBC AUTO-ENTMCNC: 34.4 G/DL (ref 31–37)
MCV RBC AUTO: 96.1 FL (ref 81–100)
MONOCYTES # BLD AUTO: 0.28 X10(3) UL (ref 0.1–0.6)
MONOCYTES NFR BLD AUTO: 8.8 %
NEUTROPHIL ABS PRELIM: 2.06 X10 (3) UL (ref 1.3–6.7)
NEUTROPHILS # BLD AUTO: 2.06 X10(3) UL (ref 1.3–6.7)
NEUTROPHILS NFR BLD AUTO: 64.8 %
PLATELET # BLD AUTO: 134 10(3)UL (ref 150–450)
POTASSIUM SERPL-SCNC: 3.7 MMOL/L (ref 3.6–5.1)
RBC # BLD AUTO: 3.81 X10(6)UL (ref 3.8–5.1)
RED CELL DISTRIBUTION WIDTH-SD: 43.5 FL (ref 35.1–46.3)
SODIUM SERPL-SCNC: 140 MMOL/L (ref 136–144)
WBC # BLD AUTO: 3.2 X10(3) UL (ref 4–13)

## 2018-02-02 PROCEDURE — 99213 OFFICE O/P EST LOW 20 MIN: CPT | Performed by: INTERNAL MEDICINE

## 2018-02-02 NOTE — PROGRESS NOTES
Cancer Center Progress Note  Patient Name: Kosta Lynch   YOB: 1950   Medical Record Number: [id number]     Attending Physician: Otto Vega M.D. Date of Visit: 2/2/2018     Chief Complaint:  Patient presents with:   Follow   No date: TONSILLECTOMY      Comment: as a child  2010: UPPER GI ENDOSCOPY,EXAM    Family History:  Family History   Problem Relation Age of Onset   • heart disease[other] [OTHER] Father    • htn[other] [OTHER] Father    • cabg[other] Celestino Bear (two) times daily. , Disp: , Rfl:   •  Ursodiol 500 MG Oral Tab, Take 1.5 tablets by mouth daily. , Disp: , Rfl:     Allergies:  No Known Allergies     Review of Systems:    Constitutional No fevers, chills, night sweats, excessive fatigue or weight loss. Neurologic Normal - No sensory or motor deficits, normal cerebellar function, normal gait, cranial nerves intact. Psychiatric Normal - Alert and oriented times three. Coherent speech. Verbalizes understanding of our discussions today.          Laborator general treatment was explained to the patient and the family. Electronically Signed by: Kyler Poole M.D.   [facility]

## 2018-02-02 NOTE — PROGRESS NOTES
6 month MD f/u for h/o MGUS; leukopenia; thrombocytopenia. Pt states she is feeling well and denies any complaints. Education Record    Learner:  Patient    Disease / Diagnosis: MGUS; leukopenia; thrombocytopenia.      Barriers / Limitations:  None   Co

## 2018-02-07 LAB
A/G RATIO: 1.2
ALBUMIN, SERUM: 4.7 G/DL (ref 3.5–4.8)
ALPHA-1 GLOBULIN: 0.22 G/DL (ref 0.1–0.3)
ALPHA-2 GLOBULIN: 0.8 G/DL (ref 0.6–1)
BETA GLOBULIN: 0.58 G/DL (ref 0.7–1.2)
GAMMA GLOBULIN: 2.3 G/DL (ref 0.6–1.6)
KAPPA FREE LIGHT CHAIN: 7.42 MG/DL (ref 0.33–1.94)
KAPPA/LAMBDA FLC RATIO: 1.79 (ref 0.26–1.65)
LAMBDA FREE LIGHT CHAIN: 4.16 MG/DL (ref 0.57–2.63)
TOTAL PROTEIN,SERUM: 8.6 G/DL (ref 6.1–8.3)

## 2018-02-16 ENCOUNTER — IMMUNIZATION (OUTPATIENT)
Dept: INTERNAL MEDICINE CLINIC | Facility: CLINIC | Age: 68
End: 2018-02-16

## 2018-02-16 DIAGNOSIS — Z23 NEED FOR VACCINATION: ICD-10-CM

## 2018-02-16 PROCEDURE — G0008 ADMIN INFLUENZA VIRUS VAC: HCPCS | Performed by: INTERNAL MEDICINE

## 2018-02-16 PROCEDURE — 90662 IIV NO PRSV INCREASED AG IM: CPT | Performed by: INTERNAL MEDICINE

## 2018-02-20 ENCOUNTER — TELEPHONE (OUTPATIENT)
Dept: INTERNAL MEDICINE CLINIC | Facility: CLINIC | Age: 68
End: 2018-02-20

## 2018-02-22 ENCOUNTER — MED REC SCAN ONLY (OUTPATIENT)
Dept: INTERNAL MEDICINE CLINIC | Facility: CLINIC | Age: 68
End: 2018-02-22

## 2018-03-21 PROBLEM — R01.1 MURMUR: Status: ACTIVE | Noted: 2018-03-21

## 2018-04-03 ENCOUNTER — HOSPITAL ENCOUNTER (OUTPATIENT)
Dept: CV DIAGNOSTICS | Facility: HOSPITAL | Age: 68
Discharge: HOME | DRG: 303 | End: 2018-04-03
Attending: INTERNAL MEDICINE
Payer: MEDICARE

## 2018-04-03 ENCOUNTER — HOSPITAL ENCOUNTER (OUTPATIENT)
Dept: LAB | Facility: HOSPITAL | Age: 68
Discharge: HOME | DRG: 303 | End: 2018-04-03
Attending: INTERNAL MEDICINE
Payer: MEDICARE

## 2018-04-03 ENCOUNTER — HOSPITAL ENCOUNTER (INPATIENT)
Facility: HOSPITAL | Age: 68
LOS: 1 days | Discharge: HOME | DRG: 303 | End: 2018-04-04
Attending: INTERNAL MEDICINE | Admitting: INTERNAL MEDICINE
Payer: MEDICARE

## 2018-04-03 DIAGNOSIS — K74.3 PRIMARY BILIARY CIRRHOSIS (HCC): ICD-10-CM

## 2018-04-03 DIAGNOSIS — I25.10 CORONARY ARTERY DISEASE INVOLVING NATIVE CORONARY ARTERY OF NATIVE HEART WITHOUT ANGINA PECTORIS: ICD-10-CM

## 2018-04-03 DIAGNOSIS — R01.1 MURMUR: ICD-10-CM

## 2018-04-03 PROBLEM — I35.8 AORTIC VALVE MASS: Status: ACTIVE | Noted: 2018-04-03

## 2018-04-03 PROCEDURE — 93306 TTE W/DOPPLER COMPLETE: CPT | Performed by: INTERNAL MEDICINE

## 2018-04-03 PROCEDURE — 36415 COLL VENOUS BLD VENIPUNCTURE: CPT | Performed by: INTERNAL MEDICINE

## 2018-04-03 PROCEDURE — 99222 1ST HOSP IP/OBS MODERATE 55: CPT | Performed by: INTERNAL MEDICINE

## 2018-04-03 PROCEDURE — 93017 CV STRESS TEST TRACING ONLY: CPT | Performed by: INTERNAL MEDICINE

## 2018-04-03 PROCEDURE — 80053 COMPREHEN METABOLIC PANEL: CPT | Performed by: INTERNAL MEDICINE

## 2018-04-03 PROCEDURE — 93018 CV STRESS TEST I&R ONLY: CPT | Performed by: INTERNAL MEDICINE

## 2018-04-03 RX ORDER — ASPIRIN 81 MG/1
81 TABLET ORAL DAILY
Status: DISCONTINUED | OUTPATIENT
Start: 2018-04-04 | End: 2018-04-04

## 2018-04-03 RX ORDER — SODIUM CHLORIDE 9 MG/ML
INJECTION, SOLUTION INTRAVENOUS CONTINUOUS
Status: DISCONTINUED | OUTPATIENT
Start: 2018-04-03 | End: 2018-04-04

## 2018-04-03 RX ORDER — SODIUM PHOSPHATE, DIBASIC AND SODIUM PHOSPHATE, MONOBASIC 7; 19 G/133ML; G/133ML
1 ENEMA RECTAL ONCE AS NEEDED
Status: DISCONTINUED | OUTPATIENT
Start: 2018-04-03 | End: 2018-04-04

## 2018-04-03 RX ORDER — POLYETHYLENE GLYCOL 3350 17 G/17G
17 POWDER, FOR SOLUTION ORAL DAILY PRN
Status: DISCONTINUED | OUTPATIENT
Start: 2018-04-03 | End: 2018-04-04

## 2018-04-03 RX ORDER — URSODIOL 300 MG/1
600 CAPSULE ORAL DAILY
Status: DISCONTINUED | OUTPATIENT
Start: 2018-04-04 | End: 2018-04-04

## 2018-04-03 RX ORDER — BISACODYL 10 MG
10 SUPPOSITORY, RECTAL RECTAL
Status: DISCONTINUED | OUTPATIENT
Start: 2018-04-03 | End: 2018-04-04

## 2018-04-03 RX ORDER — DOCUSATE SODIUM 100 MG/1
100 CAPSULE, LIQUID FILLED ORAL 2 TIMES DAILY
Status: DISCONTINUED | OUTPATIENT
Start: 2018-04-03 | End: 2018-04-04

## 2018-04-03 RX ORDER — ACETAMINOPHEN 325 MG/1
650 TABLET ORAL EVERY 6 HOURS PRN
Status: DISCONTINUED | OUTPATIENT
Start: 2018-04-03 | End: 2018-04-04

## 2018-04-03 RX ORDER — ONDANSETRON 2 MG/ML
4 INJECTION INTRAMUSCULAR; INTRAVENOUS EVERY 6 HOURS PRN
Status: DISCONTINUED | OUTPATIENT
Start: 2018-04-03 | End: 2018-04-04

## 2018-04-03 RX ORDER — HEPARIN SODIUM 5000 [USP'U]/ML
5000 INJECTION, SOLUTION INTRAVENOUS; SUBCUTANEOUS EVERY 12 HOURS SCHEDULED
Status: DISCONTINUED | OUTPATIENT
Start: 2018-04-03 | End: 2018-04-04

## 2018-04-04 ENCOUNTER — APPOINTMENT (OUTPATIENT)
Dept: CV DIAGNOSTICS | Facility: HOSPITAL | Age: 68
DRG: 303 | End: 2018-04-04
Attending: INTERNAL MEDICINE
Payer: MEDICARE

## 2018-04-04 ENCOUNTER — APPOINTMENT (OUTPATIENT)
Dept: INTERVENTIONAL RADIOLOGY/VASCULAR | Facility: HOSPITAL | Age: 68
DRG: 303 | End: 2018-04-04
Attending: NURSE PRACTITIONER
Payer: MEDICARE

## 2018-04-04 VITALS
TEMPERATURE: 98 F | WEIGHT: 111.13 LBS | RESPIRATION RATE: 18 BRPM | OXYGEN SATURATION: 96 % | HEART RATE: 52 BPM | DIASTOLIC BLOOD PRESSURE: 57 MMHG | BODY MASS INDEX: 20 KG/M2 | SYSTOLIC BLOOD PRESSURE: 108 MMHG

## 2018-04-04 PROCEDURE — 99238 HOSP IP/OBS DSCHRG MGMT 30/<: CPT | Performed by: INTERNAL MEDICINE

## 2018-04-04 PROCEDURE — 93325 DOPPLER ECHO COLOR FLOW MAPG: CPT | Performed by: INTERNAL MEDICINE

## 2018-04-04 PROCEDURE — 93320 DOPPLER ECHO COMPLETE: CPT | Performed by: INTERNAL MEDICINE

## 2018-04-04 PROCEDURE — B24BZZ4 ULTRASONOGRAPHY OF HEART WITH AORTA, TRANSESOPHAGEAL: ICD-10-PCS | Performed by: INTERNAL MEDICINE

## 2018-04-04 RX ORDER — SODIUM CHLORIDE 9 MG/ML
INJECTION, SOLUTION INTRAVENOUS CONTINUOUS
Status: DISCONTINUED | OUTPATIENT
Start: 2018-04-04 | End: 2018-04-04

## 2018-04-04 RX ORDER — MIDAZOLAM HYDROCHLORIDE 1 MG/ML
INJECTION INTRAMUSCULAR; INTRAVENOUS
Status: COMPLETED
Start: 2018-04-04 | End: 2018-04-04

## 2018-04-04 RX ORDER — POTASSIUM CHLORIDE 20 MEQ/1
40 TABLET, EXTENDED RELEASE ORAL EVERY 4 HOURS
Status: COMPLETED | OUTPATIENT
Start: 2018-04-04 | End: 2018-04-04

## 2018-04-04 RX ORDER — MIDAZOLAM HYDROCHLORIDE 1 MG/ML
1 INJECTION INTRAMUSCULAR; INTRAVENOUS EVERY 5 MIN PRN
Status: DISCONTINUED | OUTPATIENT
Start: 2018-04-04 | End: 2018-04-04

## 2018-04-04 NOTE — CONSULTS
[facility]iology  Report of Consultation    Valerie Rand Alban Patient Status:  Inpatient    6/10/1950 MRN [id number]   [facility] 8NE-A Attending Melvin Little MD   Hosp Day # 1 PCP Sapna Rodriguez DO     Reason for Consultation: Former Smoker                                                              Packs/day: 0.00      Years: 2.00         Types: Cigarettes  Smokeless tobacco: Never Used                      Comment: 1-2 years during college only  Alcohol use:  Yes Atraumatic. Eyes with no scleral icterus. Neck: Supple. No JVD. Carotids 2+ and equal in symmetric fashion. No bruits are noted. Cardiac: Regular rate and rhythm. There is a normal S1 and S2. No S3 or S4. Grade 1/6 KATRINA at apex.   No rubs or olsen 60-65%.     There was no diagnostic evidence for regional wall motion abnormalities.     Doppler parameters are consistent with abnormal left ventricular     relaxation - grade 1 diastolic dysfunction.   2. Right ventricle: Systolic function was normal.  3.

## 2018-04-04 NOTE — H&P
[facility]  History and Physical     Collin Phoenix Patient Status:  Inpatient    6/10/1950 MRN [id number]   [facility] 8NE-A Attending Adelina Servin MD   Hosp Day # 0 PCP Juan Haddad DO     Chief Complaint: Abnormal findings on e hemochromatosis[other] [OTHER] Mother    • AAA[other] [place] Mother    • Diabetes Paternal Grandmother    • Diabetes Maternal Grandfather    • Heart Disease Other    • Cancer Neg    • Stroke Neg        Allergies: No Known Allergies    Medications:    No c the last 72 hours.     Invalid input(s): LYM#, MONO#, BASOS#, EOSIN#    Recent Labs   Lab  04/03/18   1403   GLU  87   BUN  12   CREATSERUM  0.98   GFRAA  68   GFRNAA  59*   CA  9.8   ALB  4.0   NA  140   K  3.7   CL  105   CO2  29.0   ALKPHO  128   AST  70

## 2018-04-04 NOTE — PROCEDURES
Transesophageal Echocardiogram      History:  Gelacio Stapleton is a 69year old female with an abnormal aortic valve on 2 D echo.  The risks and benefits of the procedure (including, but not limited to, airway/esophageal damage, respiratory failure, myocard

## 2018-04-04 NOTE — PROGRESS NOTES
Patient arrived to S sittin up via bed. A/ox3, sb on tele, denies sob, VSS. Dr. Christa Adams at bedside, meds given, view emar. Tolerating procedure. NPO completed post procedure, tolerating cool PO liquids. Report called to [facility].  Patient transferr

## 2018-04-04 NOTE — DISCHARGE SUMMARY
[facility]  Discharge Summary    Ravindra Phoenix Patient Status:  Inpatient    6/10/1950 MRN [id number]   [facility] 8NE-A Attending Dick Richardson MD   [facility] Day # 1 PCP Kirstie Perdomo,      Date of Admission: 4/3/2018    Date of [address] heart  4. Normal appearing mitral valve. mild mitral regurgitation  5. Normal appearing sclerotic aortic valve. No vegetations. No aortic insufficiency  6. Normal appearing tricuspid valve. trace tricuspid regurgitation  7.  Normal appearing pulmonic va Jah Short, [address] [address] [phone]    In 1 week      Savi Guevara MD  [address] [facility] [phone]      Cardiology as directed          Physical Exam:  /5

## 2018-04-04 NOTE — PROGRESS NOTES
[facility]  Progress Note    Franco Phoenix Patient Status:  Inpatient    6/10/1950 MRN [id number]   [facility] 8NE-A Attending Ivan Rodriguez MD   Hosp Day # 1 PCP Pat Richardson DO     CC: Abnormal 2D echo with suspicious mass     SUBJE 04/03/2018   ALKPHO 118 04/03/2018   BILT 1.4 04/03/2018   TP 8.1 04/03/2018   AST 58 04/03/2018   ALT 34 04/03/2018   ESRML 40 04/03/2018         Meds:     Current Facility-Administered Medications:  0.9%  NaCl infusion  Intravenous Continuous   0.9%  NaC

## 2018-04-04 NOTE — PLAN OF CARE
Assumed care of patient around 0730. Pt a/o x 4, RA, NSR on tele monitor. Lungs clear bilaterally. Plan to MELISSA today around 1500. Patient updated on plan of care, verbalized understanding.        Patient/Family Goals    • Patient/Family Long Term Goal Progr

## 2018-04-05 ENCOUNTER — PATIENT OUTREACH (OUTPATIENT)
Dept: CASE MANAGEMENT | Age: 68
End: 2018-04-05

## 2018-04-05 ENCOUNTER — TELEPHONE (OUTPATIENT)
Dept: INTERNAL MEDICINE CLINIC | Facility: CLINIC | Age: 68
End: 2018-04-05

## 2018-04-05 DIAGNOSIS — R01.1 MURMUR: ICD-10-CM

## 2018-04-05 DIAGNOSIS — I35.8 AORTIC VALVE MASS: ICD-10-CM

## 2018-04-05 DIAGNOSIS — R94.39 ABNORMAL STRESS ECHO: ICD-10-CM

## 2018-04-05 NOTE — PLAN OF CARE
NURSING DISCHARGE NOTE    Discharged Home via Wheelchair. Accompanied by Family member and Support staff  Belongings Taken by patient/family. IV removed, IV catheter intact. Pt left via wheelchair, with transport. D/c instructions given.    No n

## 2018-04-05 NOTE — TELEPHONE ENCOUNTER
Patient scheduled her hospital follow up for 4/18/18 (soonest 40 min appt with Dr Vicky Lim). Please advise if she needs to come in sooner.

## 2018-04-05 NOTE — TELEPHONE ENCOUNTER
Patient discharged from [facility] and is at Moderate risk for readmission and recommended to have a TCM HFU in 1 week or by 4/18/18 at the latest. NCM attempted to schedule TCM HFU patient states she will all herself to schedule.     Triage: Please ca

## 2018-04-05 NOTE — PROGRESS NOTES
Initial Post Discharge Follow Up   Discharge Date: 4/4/18  Contact Date: 4/5/2018    Consent Verification:  Assessment Completed With: Patient  HIPAA Verified?   Yes    Discharge Dx:  Patient was admitted for abnormal findings on echo  MELISSA on 4/4 - unrem anything? yes  • Are there any reasons that keep you from taking your medication as prescribed? No  Are you having any concerns with constipation? No    Referrals/orders at D/C:  Home Health ordered at D/C? No     DME ordered at D/C? No     Services ordered patient, and orders reviewed and discussed. Any changes or updates to medications and or orders sent to PCP.

## 2018-04-09 DIAGNOSIS — D69.6 THROMBOCYTOPENIA (HCC): Primary | ICD-10-CM

## 2018-04-16 ENCOUNTER — PATIENT MESSAGE (OUTPATIENT)
Dept: INTERNAL MEDICINE CLINIC | Facility: CLINIC | Age: 68
End: 2018-04-16

## 2018-04-16 NOTE — TELEPHONE ENCOUNTER
From: Thong Phoenix  To: Antoinette Samson DO  Sent: 4/16/2018 4:09 PM CDT  Subject: Test Results Question    Hi Dr. Palma Post,  I was in the hospital overnight on 4/3-4/4 and had a urine culture done while I was there.  I got the results back late Friday on [facility]

## 2018-04-18 ENCOUNTER — OFFICE VISIT (OUTPATIENT)
Dept: INTERNAL MEDICINE CLINIC | Facility: CLINIC | Age: 68
End: 2018-04-18

## 2018-04-18 VITALS
DIASTOLIC BLOOD PRESSURE: 64 MMHG | BODY MASS INDEX: 20.24 KG/M2 | RESPIRATION RATE: 16 BRPM | HEART RATE: 65 BPM | SYSTOLIC BLOOD PRESSURE: 130 MMHG | HEIGHT: 62 IN | OXYGEN SATURATION: 97 % | WEIGHT: 110 LBS

## 2018-04-18 DIAGNOSIS — D70.9 NEUTROPENIA, UNSPECIFIED TYPE (HCC): ICD-10-CM

## 2018-04-18 DIAGNOSIS — K74.3 PRIMARY BILIARY CIRRHOSIS (HCC): ICD-10-CM

## 2018-04-18 DIAGNOSIS — D69.6 THROMBOCYTOPENIA (HCC): ICD-10-CM

## 2018-04-18 DIAGNOSIS — R82.90 MALODOROUS URINE: Primary | ICD-10-CM

## 2018-04-18 PROCEDURE — 87086 URINE CULTURE/COLONY COUNT: CPT | Performed by: INTERNAL MEDICINE

## 2018-04-18 PROCEDURE — 87186 SC STD MICRODIL/AGAR DIL: CPT | Performed by: INTERNAL MEDICINE

## 2018-04-18 PROCEDURE — 81003 URINALYSIS AUTO W/O SCOPE: CPT | Performed by: INTERNAL MEDICINE

## 2018-04-18 PROCEDURE — 87077 CULTURE AEROBIC IDENTIFY: CPT | Performed by: INTERNAL MEDICINE

## 2018-04-18 PROCEDURE — 1111F DSCHRG MED/CURRENT MED MERGE: CPT | Performed by: INTERNAL MEDICINE

## 2018-04-18 PROCEDURE — 99214 OFFICE O/P EST MOD 30 MIN: CPT | Performed by: INTERNAL MEDICINE

## 2018-04-18 RX ORDER — SULFAMETHOXAZOLE AND TRIMETHOPRIM 800; 160 MG/1; MG/1
TABLET ORAL
Qty: 10 TABLET | Refills: 0 | Status: SHIPPED | OUTPATIENT
Start: 2018-04-18 | End: 2018-05-04 | Stop reason: ALTCHOICE

## 2018-04-18 NOTE — PROGRESS NOTES
Sal Esteban is a 69year old female.   HPI:   Isaías Flight from hospital  Admitted for abnormal ECHO concerning for vegetations on valve  MELISSA showed no vegetations  Malodorous urine for weeks  Urine culture done in hospital but not treated  No frequency • heart disease[other] [OTHER] Father    • htn[other] [OTHER] Father    • cabg[other] [OTHER] Father    • Heart Disorder Father    • Hypertension Father    • Heart Surgery Father 76     CABG   • copd[other] [OTHER] Mother      not a smoker   • osteopenia Clear Clear   Spec Gravity 1.008 1.001 - 1.030   Glucose Urine Negative Negative mg/dl   Bilirubin Urine Negative Negative   Ketones Urine Negative Negative mg/dL   Blood Urine Negative Negative   pH Urine 7.0 4.5 - 8.0   Protein Urine Negative Negative mg Neutrophil Absolute 1.57 1.30 - 6.70 x10(3) uL   Lymphocyte Absolute 0.64 (L) 0.90 - 4.00 x10(3) uL   Monocyte Absolute 0.19 0.10 - 1.00 x10(3) uL   Eosinophil Absolute 0.10 0.00 - 0.30 x10(3) uL   Basophil Absolute 0.01 0.00 - 0.10 x10(3) uL   Immature visit. No Follow-up on file.

## 2018-04-23 ENCOUNTER — LAB ENCOUNTER (OUTPATIENT)
Dept: LAB | Facility: HOSPITAL | Age: 68
End: 2018-04-23
Attending: INTERNAL MEDICINE
Payer: MEDICARE

## 2018-04-23 DIAGNOSIS — D69.6 THROMBOCYTOPENIA (HCC): ICD-10-CM

## 2018-04-23 PROCEDURE — 85025 COMPLETE CBC W/AUTO DIFF WBC: CPT

## 2018-04-23 PROCEDURE — 36415 COLL VENOUS BLD VENIPUNCTURE: CPT

## 2018-05-04 PROBLEM — I35.8 AORTIC VALVE MASS: Status: RESOLVED | Noted: 2018-04-03 | Resolved: 2018-05-04

## 2018-05-04 PROBLEM — R01.1 MURMUR: Status: RESOLVED | Noted: 2018-03-21 | Resolved: 2018-05-04

## 2018-05-04 PROBLEM — I34.0 NON-RHEUMATIC MITRAL REGURGITATION: Status: ACTIVE | Noted: 2018-05-04

## 2018-06-25 ENCOUNTER — OFFICE VISIT (OUTPATIENT)
Dept: INTERNAL MEDICINE CLINIC | Facility: CLINIC | Age: 68
End: 2018-06-25

## 2018-06-25 ENCOUNTER — TELEPHONE (OUTPATIENT)
Dept: INTERNAL MEDICINE CLINIC | Facility: CLINIC | Age: 68
End: 2018-06-25

## 2018-06-25 VITALS
TEMPERATURE: 97 F | BODY MASS INDEX: 21.89 KG/M2 | DIASTOLIC BLOOD PRESSURE: 72 MMHG | SYSTOLIC BLOOD PRESSURE: 118 MMHG | HEART RATE: 72 BPM | HEIGHT: 59.5 IN | OXYGEN SATURATION: 97 % | RESPIRATION RATE: 16 BRPM | WEIGHT: 110 LBS

## 2018-06-25 DIAGNOSIS — R19.7 DIARRHEA, UNSPECIFIED TYPE: Primary | ICD-10-CM

## 2018-06-25 PROBLEM — Z86.19 HX OF CLOSTRIDIUM DIFFICILE INFECTION: Status: ACTIVE | Noted: 2018-06-25

## 2018-06-25 PROCEDURE — 99214 OFFICE O/P EST MOD 30 MIN: CPT | Performed by: INTERNAL MEDICINE

## 2018-06-25 NOTE — TELEPHONE ENCOUNTER
Patient scheduled an appt to see Dr Tika Campbell today at 3:20 for \"Diarrhea and same symptoms I had with C Diff 6 yrs ago. Very uncomfortable in lower abdomen. \"  Should she be seen sooner? Please advise. Thank you!

## 2018-06-25 NOTE — TELEPHONE ENCOUNTER
Spoke to patient. Patient states having all day, intermittent diarrhea x1 week. Abdominal cramping, pain and bloating as well. Patient tolerating food and drink. No nausea reported. As of today patient is having soft, formed stool.  Patient states \"feeling

## 2018-06-25 NOTE — PROGRESS NOTES
Rodger Espinal is a 77year old female.   HPI:   CC: diarrhea  Since last Tuesday went 10 times first day  6 times second day  Down to 2 to 3 times a day  More solid  Restricted diet  No dairy  crampy pain with relief after bowel movement  Trying to drink disease[other] [OTHER] Father    • htn[other] [OTHER] Father    • cabg[other] Blanchard Alfonso Father    • Heart Disorder Father    • Hypertension Father    • Heart Surgery Father 76     CABG   • copd[other] [OTHER] Mother      not a smoker   • osteopenia[other] [O

## 2018-07-11 ENCOUNTER — APPOINTMENT (OUTPATIENT)
Dept: LAB | Facility: HOSPITAL | Age: 68
End: 2018-07-11
Attending: INTERNAL MEDICINE
Payer: MEDICARE

## 2018-07-11 DIAGNOSIS — R19.7 DIARRHEA, UNSPECIFIED TYPE: ICD-10-CM

## 2018-07-11 PROCEDURE — 87493 C DIFF AMPLIFIED PROBE: CPT

## 2018-07-12 ENCOUNTER — LAB ENCOUNTER (OUTPATIENT)
Dept: LAB | Facility: HOSPITAL | Age: 68
End: 2018-07-12
Attending: INTERNAL MEDICINE
Payer: MEDICARE

## 2018-07-12 ENCOUNTER — TELEPHONE (OUTPATIENT)
Dept: INTERNAL MEDICINE CLINIC | Facility: CLINIC | Age: 68
End: 2018-07-12

## 2018-07-12 DIAGNOSIS — R19.7 DIARRHEA, UNSPECIFIED TYPE: ICD-10-CM

## 2018-07-12 DIAGNOSIS — R19.7 DIARRHEA, UNSPECIFIED TYPE: Primary | ICD-10-CM

## 2018-07-12 PROCEDURE — 87272 CRYPTOSPORIDIUM AG IF: CPT

## 2018-07-12 PROCEDURE — 87329 GIARDIA AG IA: CPT

## 2018-07-12 PROCEDURE — 87046 STOOL CULTR AEROBIC BACT EA: CPT

## 2018-07-12 PROCEDURE — 87177 OVA AND PARASITES SMEARS: CPT

## 2018-07-12 PROCEDURE — 87209 SMEAR COMPLEX STAIN: CPT

## 2018-07-12 PROCEDURE — 87427 SHIGA-LIKE TOXIN AG IA: CPT

## 2018-07-12 PROCEDURE — 87077 CULTURE AEROBIC IDENTIFY: CPT

## 2018-07-12 PROCEDURE — 87045 FECES CULTURE AEROBIC BACT: CPT

## 2018-07-12 NOTE — TELEPHONE ENCOUNTER
Please describe what she means by that? Diarrhea how many episodes? Per day? what is she eating  Any nausea/vomiting?

## 2018-07-12 NOTE — TELEPHONE ENCOUNTER
Please see note below. C.diff negative, does patient need another appt or should she go to her GI doctor?

## 2018-07-12 NOTE — TELEPHONE ENCOUNTER
Patient was in to see you on 6/25/18, patient was having normal stools and no discomfort until this Saturday 7/8.  Saturday and Sunday she has one episode each day of loose, liquid brown stool, but for the rest of this week she has had 3 to 5 urgent episode

## 2018-07-12 NOTE — TELEPHONE ENCOUNTER
Patient saw Dr Jose Raul Hyman on June 25th for diarrhea. She started feeling better and now feels bad again. Should she come into this office again or go to her GI doctor? Please advise. Thank you!

## 2018-07-12 NOTE — TELEPHONE ENCOUNTER
I will order stool cultures and ova and parasite  Stay hydrated and bland diet  No dairy  No fiber apples

## 2018-07-12 NOTE — TELEPHONE ENCOUNTER
Spoke with patient, informed her of Dr. Mia Rodriguez recommendations. Patient voiced understanding.

## 2018-07-13 LAB
CRYPTOSP AG STL QL IA: NEGATIVE
G LAMBLIA AG STL QL IA: NEGATIVE

## 2018-07-14 ENCOUNTER — HOSPITAL ENCOUNTER (EMERGENCY)
Facility: HOSPITAL | Age: 68
Discharge: HOME | End: 2018-07-14
Attending: EMERGENCY MEDICINE
Payer: MEDICARE

## 2018-07-14 ENCOUNTER — MOBILE ENCOUNTER (OUTPATIENT)
Dept: INTERNAL MEDICINE CLINIC | Facility: CLINIC | Age: 68
End: 2018-07-14

## 2018-07-14 ENCOUNTER — APPOINTMENT (OUTPATIENT)
Dept: CT IMAGING | Facility: HOSPITAL | Age: 68
End: 2018-07-14
Attending: EMERGENCY MEDICINE
Payer: MEDICARE

## 2018-07-14 VITALS
BODY MASS INDEX: 21.14 KG/M2 | RESPIRATION RATE: 18 BRPM | HEIGHT: 61 IN | TEMPERATURE: 99 F | OXYGEN SATURATION: 97 % | HEART RATE: 62 BPM | SYSTOLIC BLOOD PRESSURE: 124 MMHG | WEIGHT: 112 LBS | DIASTOLIC BLOOD PRESSURE: 56 MMHG

## 2018-07-14 DIAGNOSIS — E86.0 DEHYDRATION: ICD-10-CM

## 2018-07-14 DIAGNOSIS — R19.7 DIARRHEA, UNSPECIFIED TYPE: Primary | ICD-10-CM

## 2018-07-14 LAB
ALBUMIN SERPL-MCNC: 3.7 G/DL (ref 3.5–4.8)
ALP LIVER SERPL-CCNC: 123 U/L (ref 55–142)
ALT SERPL-CCNC: 39 U/L (ref 14–54)
AST SERPL-CCNC: 55 U/L (ref 15–41)
BASOPHILS # BLD AUTO: 0.01 X10(3) UL (ref 0–0.1)
BASOPHILS NFR BLD AUTO: 0.2 %
BILIRUB SERPL-MCNC: 2.8 MG/DL (ref 0.1–2)
BILIRUB UR QL STRIP.AUTO: NEGATIVE
BUN BLD-MCNC: 12 MG/DL (ref 8–20)
CALCIUM BLD-MCNC: 9.4 MG/DL (ref 8.3–10.3)
CHLORIDE: 105 MMOL/L (ref 101–111)
CLARITY UR REFRACT.AUTO: CLEAR
CO2: 28 MMOL/L (ref 22–32)
COLOR UR AUTO: YELLOW
CREAT BLD-MCNC: 0.86 MG/DL (ref 0.55–1.02)
EOSINOPHIL # BLD AUTO: 0.08 X10(3) UL (ref 0–0.3)
EOSINOPHIL NFR BLD AUTO: 1.9 %
ERYTHROCYTE [DISTWIDTH] IN BLOOD BY AUTOMATED COUNT: 11.9 % (ref 11.5–16)
GLUCOSE BLD-MCNC: 96 MG/DL (ref 70–99)
GLUCOSE UR STRIP.AUTO-MCNC: NEGATIVE MG/DL
HCT VFR BLD AUTO: 42.1 % (ref 34–50)
HGB BLD-MCNC: 15 G/DL (ref 12–16)
IMMATURE GRANULOCYTE COUNT: 0.02 X10(3) UL (ref 0–1)
IMMATURE GRANULOCYTE RATIO %: 0.5 %
KETONES UR STRIP.AUTO-MCNC: 20 MG/DL
LEUKOCYTE ESTERASE UR QL STRIP.AUTO: NEGATIVE
LIPASE: 112 U/L (ref 73–393)
LYMPHOCYTES # BLD AUTO: 0.57 X10(3) UL (ref 0.9–4)
LYMPHOCYTES NFR BLD AUTO: 13.3 %
M PROTEIN MFR SERPL ELPH: 8.8 G/DL (ref 6.1–8.3)
MCH RBC QN AUTO: 33.6 PG (ref 27–33.2)
MCHC RBC AUTO-ENTMCNC: 35.6 G/DL (ref 31–37)
MCV RBC AUTO: 94.4 FL (ref 81–100)
MONOCYTES # BLD AUTO: 0.33 X10(3) UL (ref 0.1–1)
MONOCYTES NFR BLD AUTO: 7.7 %
NEUTROPHIL ABS PRELIM: 3.29 X10 (3) UL (ref 1.3–6.7)
NEUTROPHILS # BLD AUTO: 3.29 X10(3) UL (ref 1.3–6.7)
NEUTROPHILS NFR BLD AUTO: 76.4 %
NITRITE UR QL STRIP.AUTO: POSITIVE
PH UR STRIP.AUTO: 5 [PH] (ref 4.5–8)
PLATELET # BLD AUTO: 115 10(3)UL (ref 150–450)
POTASSIUM SERPL-SCNC: 3.4 MMOL/L (ref 3.6–5.1)
PROT UR STRIP.AUTO-MCNC: NEGATIVE MG/DL
RBC # BLD AUTO: 4.46 X10(6)UL (ref 3.8–5.1)
RED CELL DISTRIBUTION WIDTH-SD: 41.2 FL (ref 35.1–46.3)
SODIUM SERPL-SCNC: 140 MMOL/L (ref 136–144)
SP GR UR STRIP.AUTO: 1.04 (ref 1–1.03)
UROBILINOGEN UR STRIP.AUTO-MCNC: <2 MG/DL
WBC # BLD AUTO: 4.3 X10(3) UL (ref 4–13)

## 2018-07-14 PROCEDURE — 96374 THER/PROPH/DIAG INJ IV PUSH: CPT

## 2018-07-14 PROCEDURE — 87186 SC STD MICRODIL/AGAR DIL: CPT | Performed by: EMERGENCY MEDICINE

## 2018-07-14 PROCEDURE — 87086 URINE CULTURE/COLONY COUNT: CPT | Performed by: EMERGENCY MEDICINE

## 2018-07-14 PROCEDURE — 96361 HYDRATE IV INFUSION ADD-ON: CPT

## 2018-07-14 PROCEDURE — 85025 COMPLETE CBC W/AUTO DIFF WBC: CPT | Performed by: EMERGENCY MEDICINE

## 2018-07-14 PROCEDURE — 81001 URINALYSIS AUTO W/SCOPE: CPT | Performed by: EMERGENCY MEDICINE

## 2018-07-14 PROCEDURE — 99284 EMERGENCY DEPT VISIT MOD MDM: CPT

## 2018-07-14 PROCEDURE — 83690 ASSAY OF LIPASE: CPT | Performed by: EMERGENCY MEDICINE

## 2018-07-14 PROCEDURE — 74177 CT ABD & PELVIS W/CONTRAST: CPT | Performed by: EMERGENCY MEDICINE

## 2018-07-14 PROCEDURE — 80053 COMPREHEN METABOLIC PANEL: CPT | Performed by: EMERGENCY MEDICINE

## 2018-07-14 PROCEDURE — 99285 EMERGENCY DEPT VISIT HI MDM: CPT

## 2018-07-14 PROCEDURE — 87077 CULTURE AEROBIC IDENTIFY: CPT | Performed by: EMERGENCY MEDICINE

## 2018-07-14 RX ORDER — HYOSCYAMINE SULFATE 0.125 MG
125 TABLET ORAL EVERY 4 HOURS PRN
Qty: 20 TABLET | Refills: 0 | Status: SHIPPED | OUTPATIENT
Start: 2018-07-14 | End: 2021-10-01

## 2018-07-14 RX ORDER — SODIUM CHLORIDE 9 MG/ML
INJECTION, SOLUTION INTRAVENOUS ONCE
Status: COMPLETED | OUTPATIENT
Start: 2018-07-14 | End: 2018-07-14

## 2018-07-14 RX ORDER — MORPHINE SULFATE 4 MG/ML
2 INJECTION, SOLUTION INTRAMUSCULAR; INTRAVENOUS ONCE
Status: COMPLETED | OUTPATIENT
Start: 2018-07-14 | End: 2018-07-14

## 2018-07-14 NOTE — PROGRESS NOTES
Pt page doctor   Still having abdominal cramping and urgency   advised to go to ER   Stool culture pending  Giardia and cryptosporidium negative  C diff neg so far

## 2018-07-14 NOTE — ED PROVIDER NOTES
Patient Seen in: [facility] Emergency Department    History   Patient presents with:  Abdomen/Flank Pain (GI/)    Stated Complaint: lower abdominal pain and more frequent stools.   Also has nausea    HPI    23-year-old female comes the hospital com HPI.  Constitutional and vital signs reviewed. All other systems reviewed and negative except as noted above.     Physical Exam   ED Triage Vitals [07/14/18 1502]  BP: 136/66  Pulse: 79  Resp: 18  Temp: 98.5 °F (36.9 °C)  Temp src: Temporal  SpO2: 98 % -----------         ------                     CBC W/ DIFFERENTIAL[[id number]]          Abnormal            Final result                 Please view results for these tests on the individual orders.    RAINBOW DRAW BLUE   RAINBOW DRAW LA No aneurysm or dissection. There are small varicosities which are seen along the lower esophagus as well as Brigette Dates splenic varicosities. RETROPERITONEUM:  No mass or adenopathy.   BOWEL/MESENTERY:  There is mild wall thickening diffusely throughout the colo management for diarrhea and dehydration.   ED Course as of Jul 14 1959  ------------------------------------------------------------      [facility]               Disposition and Plan     Clinical Impression:  Diarrhea, unspecified type  (primary encounter diagnos

## 2018-07-14 NOTE — ED INITIAL ASSESSMENT (HPI)
A few weeks ago with diarrhea. Seemed to get better. In the past week, it has restarted along with lower abdominal pain and nausea. Denies blood in stool.

## 2018-07-16 ENCOUNTER — TELEPHONE (OUTPATIENT)
Dept: INTERNAL MEDICINE CLINIC | Facility: CLINIC | Age: 68
End: 2018-07-16

## 2018-07-16 RX ORDER — CIPROFLOXACIN 500 MG/1
TABLET, FILM COATED ORAL
Qty: 14 TABLET | Refills: 0 | Status: SHIPPED | OUTPATIENT
Start: 2018-07-16 | End: 2018-07-26 | Stop reason: ALTCHOICE

## 2018-07-16 NOTE — TELEPHONE ENCOUNTER
FYI: Patient had recent ED visit 7/14. Please see Physician note from visit. Patient calling for UA and Stool sample results. Recommendation from results?

## 2018-07-16 NOTE — TELEPHONE ENCOUNTER
Pt aware preliminary urine culture with bacteria+  ?  Stool contaminant  cipro script sent to pharmacy  Pt made appt to see PA with Dr Gael Key  Stool cultures negative to date

## 2018-07-17 LAB
OVA AND PARASITE, TRICHROME STAIN: NEGATIVE
OVA AND PARASITE, WET MOUNT: NEGATIVE

## 2018-07-17 RX ORDER — CIPROFLOXACIN 500 MG/1
500 TABLET, FILM COATED ORAL 2 TIMES DAILY
Qty: 14 TABLET | Refills: 0 | Status: SHIPPED | OUTPATIENT
Start: 2018-07-17 | End: 2018-08-03 | Stop reason: ALTCHOICE

## 2018-07-24 ENCOUNTER — LAB ENCOUNTER (OUTPATIENT)
Dept: LAB | Facility: HOSPITAL | Age: 68
End: 2018-07-24
Attending: INTERNAL MEDICINE
Payer: MEDICARE

## 2018-07-24 ENCOUNTER — HOSPITAL ENCOUNTER (OUTPATIENT)
Dept: MRI IMAGING | Facility: HOSPITAL | Age: 68
Discharge: HOME | End: 2018-07-24
Attending: PHYSICIAN ASSISTANT
Payer: MEDICARE

## 2018-07-24 ENCOUNTER — APPOINTMENT (OUTPATIENT)
Dept: MRI IMAGING | Facility: HOSPITAL | Age: 68
End: 2018-07-24
Attending: INTERNAL MEDICINE
Payer: MEDICARE

## 2018-07-24 VITALS
RESPIRATION RATE: 16 BRPM | DIASTOLIC BLOOD PRESSURE: 60 MMHG | OXYGEN SATURATION: 98 % | SYSTOLIC BLOOD PRESSURE: 154 MMHG | HEART RATE: 54 BPM

## 2018-07-24 DIAGNOSIS — D47.2 MGUS (MONOCLONAL GAMMOPATHY OF UNKNOWN SIGNIFICANCE): ICD-10-CM

## 2018-07-24 DIAGNOSIS — K74.3 PRIMARY BILIARY CIRRHOSIS (HCC): ICD-10-CM

## 2018-07-24 DIAGNOSIS — R93.5 ABNORMAL CT OF THE ABDOMEN: ICD-10-CM

## 2018-07-24 DIAGNOSIS — K74.3 PRIMARY BILIARY CHOLANGITIS (HCC): ICD-10-CM

## 2018-07-24 DIAGNOSIS — D72.819 LEUKOPENIA: ICD-10-CM

## 2018-07-24 DIAGNOSIS — R17 ELEVATED BILIRUBIN: ICD-10-CM

## 2018-07-24 DIAGNOSIS — E55.9 VITAMIN D DEFICIENCY: ICD-10-CM

## 2018-07-24 LAB
AFP-TM SERPL-MCNC: 7.4 NG/ML (ref ?–8)
ALBUMIN SERPL-MCNC: 3.3 G/DL (ref 3.5–4.8)
ALBUMIN/GLOB SERPL: 0.8 {RATIO} (ref 1–2)
ALP LIVER SERPL-CCNC: 96 U/L (ref 55–142)
ALT SERPL-CCNC: 29 U/L (ref 14–54)
ANION GAP SERPL CALC-SCNC: 6 MMOL/L (ref 0–18)
AST SERPL-CCNC: 47 U/L (ref 15–41)
BASOPHILS # BLD AUTO: 0.02 X10(3) UL (ref 0–0.1)
BASOPHILS NFR BLD AUTO: 0.7 %
BILIRUB SERPL-MCNC: 1.3 MG/DL (ref 0.1–2)
BUN BLD-MCNC: 21 MG/DL (ref 8–20)
BUN/CREAT SERPL: 10.2 (ref 10–20)
CALCIUM BLD-MCNC: 9.3 MG/DL (ref 8.3–10.3)
CHLORIDE SERPL-SCNC: 109 MMOL/L (ref 101–111)
CO2 SERPL-SCNC: 28 MMOL/L (ref 22–32)
CREAT BLD-MCNC: 2.05 MG/DL (ref 0.55–1.02)
EOSINOPHIL # BLD AUTO: 0.2 X10(3) UL (ref 0–0.3)
EOSINOPHIL NFR BLD AUTO: 7.1 %
ERYTHROCYTE [DISTWIDTH] IN BLOOD BY AUTOMATED COUNT: 12 % (ref 11.5–16)
GLOBULIN PLAS-MCNC: 4.4 G/DL (ref 2.5–3.7)
GLUCOSE BLD-MCNC: 93 MG/DL (ref 70–99)
HCT VFR BLD AUTO: 36.5 % (ref 34–50)
HGB BLD-MCNC: 12.4 G/DL (ref 12–16)
IMMATURE GRANULOCYTE COUNT: 0.01 X10(3) UL (ref 0–1)
IMMATURE GRANULOCYTE RATIO %: 0.4 %
IMMUNOGLOBULIN A: 463 MG/DL (ref 70–312)
IMMUNOGLOBULIN G: 1780 MG/DL (ref 791–1643)
IMMUNOGLOBULIN M: 273 MG/DL (ref 43–279)
INR BLD: 1.19 (ref 0.9–1.1)
LYMPHOCYTES # BLD AUTO: 0.64 X10(3) UL (ref 0.9–4)
LYMPHOCYTES NFR BLD AUTO: 22.9 %
M PROTEIN MFR SERPL ELPH: 7.7 G/DL (ref 6.1–8.3)
MCH RBC QN AUTO: 33 PG (ref 27–33.2)
MCHC RBC AUTO-ENTMCNC: 34 G/DL (ref 31–37)
MCV RBC AUTO: 97.1 FL (ref 81–100)
MONOCYTES # BLD AUTO: 0.31 X10(3) UL (ref 0.1–1)
MONOCYTES NFR BLD AUTO: 11.1 %
NEUTROPHIL ABS PRELIM: 1.62 X10 (3) UL (ref 1.3–6.7)
NEUTROPHILS # BLD AUTO: 1.62 X10(3) UL (ref 1.3–6.7)
NEUTROPHILS NFR BLD AUTO: 57.8 %
OSMOLALITY SERPL CALC.SUM OF ELEC: 299 MOSM/KG (ref 275–295)
PLATELET # BLD AUTO: 108 10(3)UL (ref 150–450)
POTASSIUM SERPL-SCNC: 4 MMOL/L (ref 3.6–5.1)
PSA SERPL DL<=0.01 NG/ML-MCNC: 15.6 SECONDS (ref 12.4–14.7)
RBC # BLD AUTO: 3.76 X10(6)UL (ref 3.8–5.1)
RED CELL DISTRIBUTION WIDTH-SD: 43.3 FL (ref 35.1–46.3)
SODIUM SERPL-SCNC: 143 MMOL/L (ref 136–144)
VIT D+METAB SERPL-MCNC: 61.9 NG/ML (ref 30–100)
WBC # BLD AUTO: 2.8 X10(3) UL (ref 4–13)

## 2018-07-24 PROCEDURE — A9581 GADOXETATE DISODIUM INJ: HCPCS | Performed by: PHYSICIAN ASSISTANT

## 2018-07-24 PROCEDURE — 85610 PROTHROMBIN TIME: CPT

## 2018-07-24 PROCEDURE — 96360 HYDRATION IV INFUSION INIT: CPT

## 2018-07-24 PROCEDURE — 86334 IMMUNOFIX E-PHORESIS SERUM: CPT

## 2018-07-24 PROCEDURE — 83883 ASSAY NEPHELOMETRY NOT SPEC: CPT

## 2018-07-24 PROCEDURE — 76377 3D RENDER W/INTRP POSTPROCES: CPT | Performed by: PHYSICIAN ASSISTANT

## 2018-07-24 PROCEDURE — 85025 COMPLETE CBC W/AUTO DIFF WBC: CPT

## 2018-07-24 PROCEDURE — 84165 PROTEIN E-PHORESIS SERUM: CPT

## 2018-07-24 PROCEDURE — 80053 COMPREHEN METABOLIC PANEL: CPT

## 2018-07-24 PROCEDURE — 76376 3D RENDER W/INTRP POSTPROCES: CPT | Performed by: PHYSICIAN ASSISTANT

## 2018-07-24 PROCEDURE — 74183 MRI ABD W/O CNTR FLWD CNTR: CPT | Performed by: PHYSICIAN ASSISTANT

## 2018-07-24 PROCEDURE — 82105 ALPHA-FETOPROTEIN SERUM: CPT

## 2018-07-24 PROCEDURE — 82306 VITAMIN D 25 HYDROXY: CPT

## 2018-07-24 PROCEDURE — 36415 COLL VENOUS BLD VENIPUNCTURE: CPT

## 2018-07-24 PROCEDURE — 82784 ASSAY IGA/IGD/IGG/IGM EACH: CPT

## 2018-07-24 RX ORDER — SODIUM CHLORIDE 9 MG/ML
INJECTION, SOLUTION INTRAVENOUS ONCE
Status: COMPLETED | OUTPATIENT
Start: 2018-07-24 | End: 2018-07-24

## 2018-07-24 RX ADMIN — SODIUM CHLORIDE: 9 INJECTION, SOLUTION INTRAVENOUS at 11:00:00

## 2018-07-25 ENCOUNTER — TELEPHONE (OUTPATIENT)
Dept: HEMATOLOGY/ONCOLOGY | Facility: HOSPITAL | Age: 68
End: 2018-07-25

## 2018-07-25 NOTE — TELEPHONE ENCOUNTER
Left message on pt's voicemail to inform her of lab rsults. Immunoglobulins and CBC overall unremarkable for pt's history. Creatinine acutely elevated. No known renal disease. ?etiology? Had contrasted CT recently. Had Cipro recently.

## 2018-07-26 RX ORDER — SODIUM CHLORIDE, SODIUM LACTATE, POTASSIUM CHLORIDE, CALCIUM CHLORIDE 600; 310; 30; 20 MG/100ML; MG/100ML; MG/100ML; MG/100ML
INJECTION, SOLUTION INTRAVENOUS CONTINUOUS
Status: CANCELLED | OUTPATIENT
Start: 2018-07-26

## 2018-07-27 ENCOUNTER — TELEPHONE (OUTPATIENT)
Dept: HEMATOLOGY/ONCOLOGY | Facility: HOSPITAL | Age: 68
End: 2018-07-27

## 2018-07-27 DIAGNOSIS — E86.0 DEHYDRATION: Primary | ICD-10-CM

## 2018-07-27 LAB
ALBUMIN SERPL-MCNC: 3.85 G/DL (ref 3.5–4.8)
ALBUMIN/GLOB SERPL: 1.03 {RATIO}
ALPHA1 GLOB SERPL ELPH-MCNC: 0.23 G/DL (ref 0.1–0.3)
ALPHA2 GLOB SERPL ELPH-MCNC: 0.66 G/DL (ref 0.6–1)
B-GLOBULIN SERPL ELPH-MCNC: 0.69 G/DL (ref 0.7–1.2)
GAMMA GLOB SERPL ELPH-MCNC: 2.17 G/DL (ref 0.6–1.6)
KAPPA FREE LIGHT CHAIN: 9.89 MG/DL (ref 0.33–1.94)
KAPPA/LAMBDA FLC RATIO: 1.75 (ref 0.26–1.65)
LAMBDA FREE LIGHT CHAIN: 5.66 MG/DL (ref 0.57–2.63)
MAI PROTEIN SERPL-MCNC: 7.6 G/DL (ref 6.1–8.3)

## 2018-07-27 NOTE — TELEPHONE ENCOUNTER
Followed back up with pt regarding her elevated creatinine. She states she had colitis, had difficulty with her intake for a couple of weeks and she had known about the elevated creatinine after completing an MRI on 7/24.   She was given IVF by radiology w

## 2018-07-31 ENCOUNTER — ANESTHESIA (OUTPATIENT)
Dept: ENDOSCOPY | Facility: HOSPITAL | Age: 68
End: 2018-07-31

## 2018-07-31 ENCOUNTER — HOSPITAL ENCOUNTER (OUTPATIENT)
Facility: HOSPITAL | Age: 68
Setting detail: HOSPITAL OUTPATIENT SURGERY
Discharge: HOME | End: 2018-07-31
Attending: INTERNAL MEDICINE | Admitting: INTERNAL MEDICINE
Payer: MEDICARE

## 2018-07-31 ENCOUNTER — ANESTHESIA EVENT (OUTPATIENT)
Dept: ENDOSCOPY | Facility: HOSPITAL | Age: 68
End: 2018-07-31

## 2018-07-31 ENCOUNTER — SURGERY (OUTPATIENT)
Age: 68
End: 2018-07-31

## 2018-07-31 VITALS
WEIGHT: 111 LBS | OXYGEN SATURATION: 100 % | TEMPERATURE: 99 F | DIASTOLIC BLOOD PRESSURE: 71 MMHG | SYSTOLIC BLOOD PRESSURE: 164 MMHG | HEART RATE: 64 BPM | HEIGHT: 62 IN | RESPIRATION RATE: 18 BRPM | BODY MASS INDEX: 20.43 KG/M2

## 2018-07-31 DIAGNOSIS — R93.5 ABNORMAL CT OF THE ABDOMEN: ICD-10-CM

## 2018-07-31 DIAGNOSIS — R17 ELEVATED BILIRUBIN: ICD-10-CM

## 2018-07-31 DIAGNOSIS — K74.3 PRIMARY BILIARY CIRRHOSIS (HCC): ICD-10-CM

## 2018-07-31 PROCEDURE — 0DBE8ZX EXCISION OF LARGE INTESTINE, VIA NATURAL OR ARTIFICIAL OPENING ENDOSCOPIC, DIAGNOSTIC: ICD-10-PCS | Performed by: INTERNAL MEDICINE

## 2018-07-31 PROCEDURE — 88305 TISSUE EXAM BY PATHOLOGIST: CPT | Performed by: INTERNAL MEDICINE

## 2018-07-31 PROCEDURE — 0DB78ZX EXCISION OF STOMACH, PYLORUS, VIA NATURAL OR ARTIFICIAL OPENING ENDOSCOPIC, DIAGNOSTIC: ICD-10-PCS | Performed by: INTERNAL MEDICINE

## 2018-07-31 PROCEDURE — 0DB98ZX EXCISION OF DUODENUM, VIA NATURAL OR ARTIFICIAL OPENING ENDOSCOPIC, DIAGNOSTIC: ICD-10-PCS | Performed by: INTERNAL MEDICINE

## 2018-07-31 PROCEDURE — 0DBN8ZX EXCISION OF SIGMOID COLON, VIA NATURAL OR ARTIFICIAL OPENING ENDOSCOPIC, DIAGNOSTIC: ICD-10-PCS | Performed by: INTERNAL MEDICINE

## 2018-07-31 RX ORDER — SODIUM CHLORIDE, SODIUM LACTATE, POTASSIUM CHLORIDE, CALCIUM CHLORIDE 600; 310; 30; 20 MG/100ML; MG/100ML; MG/100ML; MG/100ML
INJECTION, SOLUTION INTRAVENOUS CONTINUOUS
Status: DISCONTINUED | OUTPATIENT
Start: 2018-07-31 | End: 2018-07-31

## 2018-07-31 RX ORDER — NALOXONE HYDROCHLORIDE 0.4 MG/ML
80 INJECTION, SOLUTION INTRAMUSCULAR; INTRAVENOUS; SUBCUTANEOUS AS NEEDED
Status: DISCONTINUED | OUTPATIENT
Start: 2018-07-31 | End: 2018-07-31

## 2018-07-31 NOTE — OPERATIVE REPORT
Edykamla Christie Stump Patient Status:  Hospital Outpatient Surgery    6/10/1950 MRN [id number]   [facility] ENDOSCOPY Attending Cezar Skinner MD   Hosp Day # 0 PCP Brant Mann MD     PREOPERATIVE DIAGNOSIS/INDICATION: Cirrhosis, abnorm precautions, watch for bleeding, infection, perforation, adverse drug reactions   - Follow biopsies.  - Repeat colonoscopy in 5 years.   [facility]  7/31/2018  9:51 AM

## 2018-07-31 NOTE — H&P
[address] Francisco Phoenix Patient Status:  Hospital Outpatient Surgery    6/10/1950 MRN [id number]   [facility] ENDOSCOPY Attending Doreen Izquierdo MD   Hosp Day # 0 PCP Albaro Gloria MD     CC: Cirrhosis, abno pressure (!) 164/71, pulse 64, temperature 98.5 °F (36.9 °C), temperature source Oral, resp. rate 16, height 62\", weight 111 lb, SpO2 100 %, not currently breastfeeding. General: Appears alert, oriented x3 and in no acute distress.   HEENT: Normal.  CV:

## 2018-07-31 NOTE — ANESTHESIA POSTPROCEDURE EVALUATION
[address] [facility] A Stump Patient Status:  Hospital Outpatient Surgery   Age/Gender 77year old female MRN [id number]   Location [facility]. Attending Alanis Brown MD   Hosp Day # 0 PCP Robert Bustamante MD       Anesthesia Post-o

## 2018-07-31 NOTE — OPERATIVE REPORT
Dinah Rodriguez Stump Patient Status:  Hospital Outpatient Surgery    6/10/1950 MRN [id number]   [facility] ENDOSCOPY Attending Severo Posey, MD   Hosp Day # 0 PCP Cely Fitch MD     PREOPERATIVE DIAGNOSIS/INDICATION: Cirrhosis, abnorm

## 2018-07-31 NOTE — ANESTHESIA PREPROCEDURE EVALUATION
PRE-OP EVALUATION    Patient Name: Claire Phoenix    Pre-op Diagnosis: Primary biliary cirrhosis ([facility] [address] [K74.3]  Abnormal CT of the abdomen [R93.5]  Elevated bilirubin [R17]    Procedure(s):  ESOPHAGOGASTRODUODENOSCOPY (EGD)/ Colonoscopy      Surgeon(s) a 7. Normal appearing pulmonic valve  8. Normal ascending and descending aorta in portions visualized  9. Normal interatrial septum w/o evidence of shunting    Impression/Plan:    Unremarkable MELISSA.  No aortic valve vegetations.  Sclerotic   changes, mild. Plan/risks discussed with: patient and child/children                Present on Admission:  **None**

## 2018-08-03 ENCOUNTER — OFFICE VISIT (OUTPATIENT)
Dept: HEMATOLOGY/ONCOLOGY | Facility: HOSPITAL | Age: 68
End: 2018-08-03
Attending: INTERNAL MEDICINE
Payer: MEDICARE

## 2018-08-03 VITALS
HEIGHT: 62.01 IN | RESPIRATION RATE: 18 BRPM | HEART RATE: 69 BPM | DIASTOLIC BLOOD PRESSURE: 72 MMHG | WEIGHT: 111 LBS | TEMPERATURE: 98 F | OXYGEN SATURATION: 98 % | SYSTOLIC BLOOD PRESSURE: 156 MMHG | BODY MASS INDEX: 20.17 KG/M2

## 2018-08-03 DIAGNOSIS — D72.819 LEUKOPENIA, UNSPECIFIED TYPE: Primary | ICD-10-CM

## 2018-08-03 DIAGNOSIS — K74.3 HEPATIC CIRRHOSIS DUE TO PRIMARY BILIARY CHOLANGITIS (HCC): ICD-10-CM

## 2018-08-03 DIAGNOSIS — D69.6 THROMBOCYTOPENIA (HCC): ICD-10-CM

## 2018-08-03 DIAGNOSIS — N28.9 RENAL INSUFFICIENCY: ICD-10-CM

## 2018-08-03 PROCEDURE — 99213 OFFICE O/P EST LOW 20 MIN: CPT | Performed by: INTERNAL MEDICINE

## 2018-08-03 NOTE — PROGRESS NOTES
Cancer Center Progress Note  Patient Name: Jose Alfredo Cowart   YOB: 1950   Medical Record Number: [id number]     Attending Physician: Padmini Little M.D. Date of Visit: 8/3/2018     Chief Complaint:  Patient presents with:   Follow shoulder, intradermal melanocytic nevus w/congenital features       Past Surgical History:  Past Surgical History:  No date: ANGIOGRAM  02/16/2010: COLONOSCOPY  No date: COLONOSCOPY  7/31/2018: COLONOSCOPY N/A      Comment: Procedure: COLONOSCOPY;  Surgeon for Nausea., Disp: 20 tablet, Rfl: 0  •  Hyoscyamine Sulfate (LEVSIN) 0.125 MG Oral Tab, Take 1 tablet (125 mcg total) by mouth every 4 (four) hours as needed for Cramping., Disp: 20 tablet, Rfl: 0  •  Ursodiol 500 MG Oral Tab, Take 1.5 tablets (750 mg tot yellowing. Hematologic/Lymphatic No easy bruising or bleeding. Denies tender or palpable lymph nodes. Respiratory No dyspnea, pleuritic chest pain, cough or hemoptysis. Cardiovascular No anginal chest pain, palpitations or orthopnea.    Gastrointesti 6.70 x10 (3) uL 1.25 (L)   Neutrophils Absolute Latest Ref Range: 1.30 - 6.70 x10(3) uL 1.25 (L)   Lymphocytes Absolute Latest Ref Range: 0.90 - 4.00 x10(3) uL 0.50 (L)   Monocytes Absolute Latest Ref Range: 0.10 - 1.00 x10(3) uL 0.19   Eosinophils Absolut

## 2018-08-04 NOTE — PROGRESS NOTES
Date: 2018    To: Susy Phoenix  : 6/10/1950    I hope this letter finds you doing well. I am writing to inform you of the following:      The biopsies obtained at the time of your recent upper endoscopic procedure were benign and showed no evide

## 2018-08-05 ENCOUNTER — TELEPHONE (OUTPATIENT)
Dept: MEDSURG UNIT | Facility: HOSPITAL | Age: 68
End: 2018-08-05

## 2018-08-05 DIAGNOSIS — R19.7 DIARRHEA, UNSPECIFIED TYPE: Primary | ICD-10-CM

## 2018-08-08 ENCOUNTER — APPOINTMENT (OUTPATIENT)
Dept: LAB | Facility: HOSPITAL | Age: 68
End: 2018-08-08
Attending: INTERNAL MEDICINE
Payer: MEDICARE

## 2018-08-08 DIAGNOSIS — R19.7 DIARRHEA, UNSPECIFIED TYPE: ICD-10-CM

## 2018-08-08 PROCEDURE — 87493 C DIFF AMPLIFIED PROBE: CPT

## 2018-08-10 ENCOUNTER — LAB ENCOUNTER (OUTPATIENT)
Dept: LAB | Facility: HOSPITAL | Age: 68
End: 2018-08-10
Attending: PHYSICIAN ASSISTANT
Payer: MEDICARE

## 2018-08-10 DIAGNOSIS — R30.1 TENESMUS OF BLADDER: ICD-10-CM

## 2018-08-10 LAB
BILIRUB UR QL STRIP.AUTO: NEGATIVE
COLOR UR AUTO: YELLOW
GLUCOSE UR STRIP.AUTO-MCNC: NEGATIVE MG/DL
KETONES UR STRIP.AUTO-MCNC: NEGATIVE MG/DL
NITRITE UR QL STRIP.AUTO: NEGATIVE
PH UR STRIP.AUTO: 5 [PH] (ref 4.5–8)
PROT UR STRIP.AUTO-MCNC: NEGATIVE MG/DL
RBC #/AREA URNS AUTO: >10 /HPF
SP GR UR STRIP.AUTO: 1.01 (ref 1–1.03)
UROBILINOGEN UR STRIP.AUTO-MCNC: <2 MG/DL

## 2018-08-10 PROCEDURE — 81001 URINALYSIS AUTO W/SCOPE: CPT

## 2018-08-10 PROCEDURE — 87086 URINE CULTURE/COLONY COUNT: CPT

## 2018-08-16 ENCOUNTER — LAB ENCOUNTER (OUTPATIENT)
Dept: LAB | Facility: HOSPITAL | Age: 68
End: 2018-08-16
Attending: PHYSICIAN ASSISTANT
Payer: MEDICARE

## 2018-08-16 DIAGNOSIS — R19.7 DIARRHEA, UNSPECIFIED TYPE: ICD-10-CM

## 2018-08-16 PROCEDURE — 87427 SHIGA-LIKE TOXIN AG IA: CPT

## 2018-08-16 PROCEDURE — 87209 SMEAR COMPLEX STAIN: CPT

## 2018-08-16 PROCEDURE — 82656 EL-1 FECAL QUAL/SEMIQ: CPT

## 2018-08-16 PROCEDURE — 87045 FECES CULTURE AEROBIC BACT: CPT

## 2018-08-16 PROCEDURE — 87329 GIARDIA AG IA: CPT

## 2018-08-16 PROCEDURE — 87046 STOOL CULTR AEROBIC BACT EA: CPT

## 2018-08-16 PROCEDURE — 87272 CRYPTOSPORIDIUM AG IF: CPT

## 2018-08-16 PROCEDURE — 87177 OVA AND PARASITES SMEARS: CPT

## 2018-08-17 LAB
CRYPTOSP AG STL QL IA: NEGATIVE
G LAMBLIA AG STL QL IA: NEGATIVE

## 2018-08-19 LAB — PANCREATIC ELASTASE , FECAL: >500 UG/G

## 2018-08-21 LAB
OVA AND PARASITE, TRICHROME STAIN: NEGATIVE
OVA AND PARASITE, WET MOUNT: NEGATIVE

## 2018-08-24 ENCOUNTER — OFFICE VISIT (OUTPATIENT)
Dept: INTERNAL MEDICINE CLINIC | Facility: CLINIC | Age: 68
End: 2018-08-24
Payer: MEDICARE

## 2018-08-24 VITALS
HEART RATE: 60 BPM | WEIGHT: 108.19 LBS | TEMPERATURE: 98 F | DIASTOLIC BLOOD PRESSURE: 70 MMHG | HEIGHT: 60.5 IN | BODY MASS INDEX: 20.69 KG/M2 | SYSTOLIC BLOOD PRESSURE: 138 MMHG | RESPIRATION RATE: 16 BRPM

## 2018-08-24 DIAGNOSIS — M81.8 OTHER OSTEOPOROSIS, UNSPECIFIED PATHOLOGICAL FRACTURE PRESENCE: ICD-10-CM

## 2018-08-24 DIAGNOSIS — I25.10 CORONARY ARTERY DISEASE INVOLVING NATIVE CORONARY ARTERY OF NATIVE HEART WITHOUT ANGINA PECTORIS: ICD-10-CM

## 2018-08-24 DIAGNOSIS — I85.00 ESOPHAGEAL VARICES WITHOUT BLEEDING, UNSPECIFIED ESOPHAGEAL VARICES TYPE (HCC): ICD-10-CM

## 2018-08-24 DIAGNOSIS — Z12.31 ENCOUNTER FOR SCREENING MAMMOGRAM FOR MALIGNANT NEOPLASM OF BREAST: ICD-10-CM

## 2018-08-24 DIAGNOSIS — R19.7 DIARRHEA, UNSPECIFIED TYPE: ICD-10-CM

## 2018-08-24 DIAGNOSIS — Z78.0 POST-MENOPAUSAL: ICD-10-CM

## 2018-08-24 DIAGNOSIS — Z87.42 H/O VAGINAL BLEEDING: ICD-10-CM

## 2018-08-24 DIAGNOSIS — K74.3 PRIMARY BILIARY CIRRHOSIS (HCC): ICD-10-CM

## 2018-08-24 DIAGNOSIS — I10 ESSENTIAL HYPERTENSION: ICD-10-CM

## 2018-08-24 DIAGNOSIS — Z00.00 WELLNESS EXAMINATION: Primary | ICD-10-CM

## 2018-08-24 PROBLEM — K76.6 PORTAL HYPERTENSION (HCC): Status: RESOLVED | Noted: 2018-08-24 | Resolved: 2018-08-24

## 2018-08-24 PROBLEM — K76.6 PORTAL HYPERTENSION (HCC): Status: ACTIVE | Noted: 2018-08-24

## 2018-08-24 PROCEDURE — 90732 PPSV23 VACC 2 YRS+ SUBQ/IM: CPT | Performed by: INTERNAL MEDICINE

## 2018-08-24 PROCEDURE — G0439 PPPS, SUBSEQ VISIT: HCPCS | Performed by: INTERNAL MEDICINE

## 2018-08-24 PROCEDURE — G0009 ADMIN PNEUMOCOCCAL VACCINE: HCPCS | Performed by: INTERNAL MEDICINE

## 2018-08-24 NOTE — PROGRESS NOTES
REASON FOR VISIT:    Kosta Lynch is a 77year old female who presents for a Medicare Annual Wellness visit.     Patient new to me, here for wellness-  PBC - [facility], diagnosed 25 years ago, on ursodiol and recently started on propranolol for e tablet Rfl: 0   Hyoscyamine Sulfate (LEVSIN) 0.125 MG Oral Tab Take 1 tablet (125 mcg total) by mouth every 4 (four) hours as needed for Cramping. Disp: 20 tablet Rfl: 0   Ursodiol 500 MG Oral Tab Take 1.5 tablets (750 mg total) by mouth daily.  Disp: 45 ta Balanced  How does the patient maintain a good energy level?: Daily Walks  How would you describe your daily physical activity?: Light  How would you describe your current health state?: Fair  How do you maintain positive mental well-being?: Carlos; Yvette Hightower Correct  What year is it?: Correct  Recall \"Ball\": Correct  Recall \"Flag\": Correct  Recall \"Tree\": Correct         PREVENTATIVE SERVICES   INDICATIONS AND SCHEDULE Internal Lab or Procedure   Diabetes Screening     HbgA1C   Annually No results found Lili Lobato MD;  Location: [address] [place] [address] Street: [facility]  No date: TONSILLECTOMY      Comment: as a child  2010: UPPER GI ENDOSCOPY,EXAM   Family History   Problem Relation Age of Onset   • heart disease[other] [OTHER] Father    • htn[other] [OT 138/70   Pulse 60   Temp 97.8 °F (36.6 °C) (Oral)   Resp 16   Ht 60.5\"   Wt 108 lb 3.2 oz   BMI 20.78 kg/m²    > BP Readings from Last 3 Encounters:  08/24/18 : 138/70  08/09/18 : 151/73  08/03/18 : 156/72    GENERAL: well developed, well nourished, in no months      Post-menopausal  -     XR DEXA BONE DENSITOMETRY (CPT=77080); Future    Encounter for screening mammogram for malignant neoplasm of breast  -     [facility] SCREENING BILAT (CPT=77067);  Future    H/O vaginal bleeding- f/u gyne as planned  -     OBG -

## 2018-08-27 ENCOUNTER — HOSPITAL ENCOUNTER (OUTPATIENT)
Dept: BONE DENSITY | Age: 68
Discharge: HOME | End: 2018-08-27
Attending: INTERNAL MEDICINE
Payer: MEDICARE

## 2018-08-27 ENCOUNTER — HOSPITAL ENCOUNTER (OUTPATIENT)
Dept: MAMMOGRAPHY | Age: 68
Discharge: HOME | End: 2018-08-27
Attending: INTERNAL MEDICINE
Payer: MEDICARE

## 2018-08-27 ENCOUNTER — APPOINTMENT (OUTPATIENT)
Dept: LAB | Age: 68
End: 2018-08-27
Attending: INTERNAL MEDICINE
Payer: MEDICARE

## 2018-08-27 DIAGNOSIS — Z78.0 POST-MENOPAUSAL: ICD-10-CM

## 2018-08-27 DIAGNOSIS — M81.8 OTHER OSTEOPOROSIS, UNSPECIFIED PATHOLOGICAL FRACTURE PRESENCE: ICD-10-CM

## 2018-08-27 DIAGNOSIS — K74.3 PRIMARY BILIARY CIRRHOSIS (HCC): ICD-10-CM

## 2018-08-27 DIAGNOSIS — R19.7 DIARRHEA, UNSPECIFIED TYPE: ICD-10-CM

## 2018-08-27 DIAGNOSIS — Z12.31 ENCOUNTER FOR SCREENING MAMMOGRAM FOR MALIGNANT NEOPLASM OF BREAST: ICD-10-CM

## 2018-08-27 DIAGNOSIS — I25.10 CORONARY ARTERY DISEASE INVOLVING NATIVE CORONARY ARTERY OF NATIVE HEART WITHOUT ANGINA PECTORIS: ICD-10-CM

## 2018-08-27 LAB
ANION GAP SERPL CALC-SCNC: 8 MMOL/L (ref 0–18)
BUN BLD-MCNC: 13 MG/DL (ref 8–20)
BUN/CREAT SERPL: 13.7 (ref 10–20)
CALCIUM BLD-MCNC: 9.1 MG/DL (ref 8.3–10.3)
CHLORIDE SERPL-SCNC: 107 MMOL/L (ref 101–111)
CHOLEST SMN-MCNC: 131 MG/DL (ref ?–200)
CO2 SERPL-SCNC: 29 MMOL/L (ref 22–32)
CREAT BLD-MCNC: 0.95 MG/DL (ref 0.55–1.02)
GLUCOSE BLD-MCNC: 76 MG/DL (ref 70–99)
HDLC SERPL-MCNC: 48 MG/DL (ref 40–59)
LDLC SERPL CALC-MCNC: 68 MG/DL (ref ?–100)
NONHDLC SERPL-MCNC: 83 MG/DL (ref ?–130)
OSMOLALITY SERPL CALC.SUM OF ELEC: 297 MOSM/KG (ref 275–295)
POTASSIUM SERPL-SCNC: 3.5 MMOL/L (ref 3.6–5.1)
SODIUM SERPL-SCNC: 144 MMOL/L (ref 136–144)
T4 FREE SERPL-MCNC: 1 NG/DL (ref 0.9–1.8)
TRIGL SERPL-MCNC: 77 MG/DL (ref 30–149)
TSI SER-ACNC: 3.17 MIU/ML (ref 0.35–5.5)
VLDLC SERPL CALC-MCNC: 15 MG/DL (ref 0–30)

## 2018-08-27 PROCEDURE — 77063 BREAST TOMOSYNTHESIS BI: CPT | Performed by: INTERNAL MEDICINE

## 2018-08-27 PROCEDURE — 80061 LIPID PANEL: CPT

## 2018-08-27 PROCEDURE — 80048 BASIC METABOLIC PNL TOTAL CA: CPT

## 2018-08-27 PROCEDURE — 84439 ASSAY OF FREE THYROXINE: CPT

## 2018-08-27 PROCEDURE — 77080 DXA BONE DENSITY AXIAL: CPT | Performed by: INTERNAL MEDICINE

## 2018-08-27 PROCEDURE — 77067 SCR MAMMO BI INCL CAD: CPT | Performed by: INTERNAL MEDICINE

## 2018-08-27 PROCEDURE — 84443 ASSAY THYROID STIM HORMONE: CPT

## 2018-08-27 PROCEDURE — 36415 COLL VENOUS BLD VENIPUNCTURE: CPT

## 2018-08-28 DIAGNOSIS — E87.6 HYPOKALEMIA: Primary | ICD-10-CM

## 2018-09-04 ENCOUNTER — APPOINTMENT (OUTPATIENT)
Dept: LAB | Age: 68
End: 2018-09-04
Attending: INTERNAL MEDICINE
Payer: MEDICARE

## 2018-09-04 DIAGNOSIS — E87.6 HYPOKALEMIA: ICD-10-CM

## 2018-09-04 LAB
ANION GAP SERPL CALC-SCNC: 5 MMOL/L (ref 0–18)
BUN BLD-MCNC: 12 MG/DL (ref 8–20)
BUN/CREAT SERPL: 11.4 (ref 10–20)
CALCIUM BLD-MCNC: 9.1 MG/DL (ref 8.3–10.3)
CHLORIDE SERPL-SCNC: 105 MMOL/L (ref 101–111)
CO2 SERPL-SCNC: 30 MMOL/L (ref 22–32)
CREAT BLD-MCNC: 1.05 MG/DL (ref 0.55–1.02)
GLUCOSE BLD-MCNC: 86 MG/DL (ref 70–99)
OSMOLALITY SERPL CALC.SUM OF ELEC: 289 MOSM/KG (ref 275–295)
POTASSIUM SERPL-SCNC: 4 MMOL/L (ref 3.6–5.1)
SODIUM SERPL-SCNC: 140 MMOL/L (ref 136–144)

## 2018-09-04 PROCEDURE — 36415 COLL VENOUS BLD VENIPUNCTURE: CPT

## 2018-09-04 PROCEDURE — 80048 BASIC METABOLIC PNL TOTAL CA: CPT

## 2018-09-10 ENCOUNTER — OFFICE VISIT (OUTPATIENT)
Dept: OBGYN CLINIC | Facility: CLINIC | Age: 68
End: 2018-09-10
Payer: MEDICARE

## 2018-09-10 VITALS
TEMPERATURE: 97 F | SYSTOLIC BLOOD PRESSURE: 144 MMHG | WEIGHT: 106 LBS | HEIGHT: 62 IN | HEART RATE: 84 BPM | BODY MASS INDEX: 19.51 KG/M2 | RESPIRATION RATE: 12 BRPM | DIASTOLIC BLOOD PRESSURE: 84 MMHG

## 2018-09-10 DIAGNOSIS — N95.0 POST-MENOPAUSAL BLEEDING: ICD-10-CM

## 2018-09-10 DIAGNOSIS — Z01.419 ENCOUNTER FOR ANNUAL ROUTINE GYNECOLOGICAL EXAMINATION: Primary | ICD-10-CM

## 2018-09-10 DIAGNOSIS — Z11.51 SCREENING FOR HUMAN PAPILLOMAVIRUS: ICD-10-CM

## 2018-09-10 PROCEDURE — 87624 HPV HI-RISK TYP POOLED RSLT: CPT | Performed by: OBSTETRICS & GYNECOLOGY

## 2018-09-10 PROCEDURE — 88175 CYTOPATH C/V AUTO FLUID REDO: CPT | Performed by: OBSTETRICS & GYNECOLOGY

## 2018-09-10 PROCEDURE — G0101 CA SCREEN;PELVIC/BREAST EXAM: HCPCS | Performed by: OBSTETRICS & GYNECOLOGY

## 2018-09-10 PROCEDURE — 99202 OFFICE O/P NEW SF 15 MIN: CPT | Performed by: OBSTETRICS & GYNECOLOGY

## 2018-09-10 NOTE — PROGRESS NOTES
Amanda Garnica is a 77year old female  No LMP recorded. Patient is postmenopausal. Patient presents with:  Physical  .  And postmenopausal bleeding. Patient states that in mid July she had a urinary tract infection and was given Cipro.   1-2 days af Socioeconomic History      Marital status:       Spouse name: Not on file      Number of children: Not on file      Years of education: Not on file      Highest education level: Not on file    Social Needs      Financial resource strain: Not on kathryn Other    • Cancer Neg    • Stroke Neg        MEDICATIONS:    Current Outpatient Medications:   •  Propranolol HCl 20 MG Oral Tab, Take 1 tablet (20 mg total) by mouth 2 (two) times daily. , Disp: 60 tablet, Rfl: 3  •  Ondansetron HCl (ZOFRAN) 4 mg tablet, T Constitutional: well developed, well nourished  Head/Face: normocephalic  Neck/Thyroid: thyroid symmetric, no thyromegaly, no nodules, no adenopathy  Lymphatic:no abnormal supraclavicular or axillary adenopathy is noted  Breast: normal without palpable m supplementation. Encouraged regular weight-bearing exercises. RTO 1 year or earlier as needed. Workup for postmenopausal bleeding was discussed in detail with the patient.   It is likely that the bleeding could be from the polypoid lesion at the ur

## 2018-09-10 NOTE — PROGRESS NOTES
Pt had uti and was prescribed Cipro which she feels may have caused the vaginal bleeding. Sx were for about 1 week. Pt states bleeding occurred mostly in the morning. The first few days were the heaviest and then it tapered off.

## 2018-09-12 LAB — HPV I/H RISK 1 DNA SPEC QL NAA+PROBE: NEGATIVE

## 2018-09-24 ENCOUNTER — APPOINTMENT (OUTPATIENT)
Dept: OBGYN CLINIC | Facility: CLINIC | Age: 68
End: 2018-09-24
Payer: MEDICARE

## 2018-10-11 ENCOUNTER — OFFICE VISIT (OUTPATIENT)
Dept: INTERNAL MEDICINE CLINIC | Facility: CLINIC | Age: 68
End: 2018-10-11
Payer: MEDICARE

## 2018-10-11 VITALS
HEART RATE: 72 BPM | SYSTOLIC BLOOD PRESSURE: 140 MMHG | BODY MASS INDEX: 20.18 KG/M2 | RESPIRATION RATE: 14 BRPM | TEMPERATURE: 98 F | DIASTOLIC BLOOD PRESSURE: 82 MMHG | WEIGHT: 109.63 LBS | HEIGHT: 62 IN

## 2018-10-11 DIAGNOSIS — Z23 NEEDS FLU SHOT: ICD-10-CM

## 2018-10-11 DIAGNOSIS — M81.8 OTHER OSTEOPOROSIS, UNSPECIFIED PATHOLOGICAL FRACTURE PRESENCE: ICD-10-CM

## 2018-10-11 DIAGNOSIS — I10 ESSENTIAL HYPERTENSION: Primary | ICD-10-CM

## 2018-10-11 DIAGNOSIS — R31.29 MICROSCOPIC HEMATURIA: ICD-10-CM

## 2018-10-11 PROCEDURE — 87088 URINE BACTERIA CULTURE: CPT | Performed by: INTERNAL MEDICINE

## 2018-10-11 PROCEDURE — 87086 URINE CULTURE/COLONY COUNT: CPT | Performed by: INTERNAL MEDICINE

## 2018-10-11 PROCEDURE — 87186 SC STD MICRODIL/AGAR DIL: CPT | Performed by: INTERNAL MEDICINE

## 2018-10-11 PROCEDURE — 90653 IIV ADJUVANT VACCINE IM: CPT | Performed by: INTERNAL MEDICINE

## 2018-10-11 PROCEDURE — 81003 URINALYSIS AUTO W/O SCOPE: CPT | Performed by: INTERNAL MEDICINE

## 2018-10-11 PROCEDURE — 99214 OFFICE O/P EST MOD 30 MIN: CPT | Performed by: INTERNAL MEDICINE

## 2018-10-11 PROCEDURE — G0008 ADMIN INFLUENZA VIRUS VAC: HCPCS | Performed by: INTERNAL MEDICINE

## 2018-10-11 RX ORDER — PROPRANOLOL HYDROCHLORIDE 20 MG/1
20 TABLET ORAL 3 TIMES DAILY
Qty: 90 TABLET | Refills: 3 | Status: SHIPPED | OUTPATIENT
Start: 2018-10-11 | End: 2018-11-19

## 2018-10-11 NOTE — PROGRESS NOTES
Paramjit Hammond is a 77year old female. Patient presents with: Follow - Up: cn room 3: follow up on meds and labs , BP, blood in urine, osteoporosis      HPI:     Patient here for f/u  BP running 140-150/60-80s, discussed she has HTN.  On propranolol 20 Rfl:    ibuprofen (ADVIL) 200 MG Oral Tab Take 200 mg by mouth every 6 (six) hours as needed for Pain. Disp:  Rfl:    Calcium Carbonate-Vitamin D (CALTRATE 600+D) 600-400 MG-UNIT Oral Tab Take 1 Tab by mouth 2 (two) times daily.  Disp:  Rfl:       Past Medi dizziness, focal weakness  PSYCH: No reported depression or anxiety  RHEUM: No reported joint stiffness or swelling  HEME: No adenopathy      EXAM:   /82   Pulse 72   Temp 97.6 °F (36.4 °C) (Oral)   Resp 14   Ht 62\"   Wt 109 lb 9.6 oz   BMI 20.05 kg

## 2018-10-13 ENCOUNTER — TELEPHONE (OUTPATIENT)
Dept: INTERNAL MEDICINE CLINIC | Facility: CLINIC | Age: 68
End: 2018-10-13

## 2018-10-13 RX ORDER — CEPHALEXIN 500 MG/1
500 CAPSULE ORAL 2 TIMES DAILY
Qty: 14 CAPSULE | Refills: 0 | Status: SHIPPED | OUTPATIENT
Start: 2018-10-13 | End: 2018-11-19 | Stop reason: ALTCHOICE

## 2018-10-17 ENCOUNTER — OFFICE VISIT (OUTPATIENT)
Dept: SURGERY | Facility: CLINIC | Age: 68
End: 2018-10-17
Payer: MEDICARE

## 2018-10-17 VITALS
DIASTOLIC BLOOD PRESSURE: 73 MMHG | WEIGHT: 107.5 LBS | BODY MASS INDEX: 20 KG/M2 | TEMPERATURE: 97 F | SYSTOLIC BLOOD PRESSURE: 152 MMHG | RESPIRATION RATE: 18 BRPM | HEART RATE: 50 BPM | OXYGEN SATURATION: 100 %

## 2018-10-17 DIAGNOSIS — K74.3 HEPATIC CIRRHOSIS DUE TO PRIMARY BILIARY CHOLANGITIS (HCC): Primary | ICD-10-CM

## 2018-10-17 NOTE — PROGRESS NOTES
[facility]  [address] [facility], [address]  [address]  [phone] ([phone]) melanocytic nevus w/congenital features      Past Surgical History:   Procedure Laterality Date   • ANGIOGRAM     • COLONOSCOPY  02/16/2010   • COLONOSCOPY     • COLONOSCOPY N/A 7/31/2018    Procedure: COLONOSCOPY;  Surgeon: Cristi Wray MD;  Bridget Og tablet, Rfl: 0  •  Hyoscyamine Sulfate (LEVSIN) 0.125 MG Oral Tab, Take 1 tablet (125 mcg total) by mouth every 4 (four) hours as needed for Cramping., Disp: 20 tablet, Rfl: 0  •  Ursodiol 500 MG Oral Tab, Take 1.5 tablets (750 mg total) by mouth daily. , D

## 2018-11-19 ENCOUNTER — OFFICE VISIT (OUTPATIENT)
Dept: INTERNAL MEDICINE CLINIC | Facility: CLINIC | Age: 68
End: 2018-11-19
Payer: MEDICARE

## 2018-11-19 VITALS
TEMPERATURE: 98 F | HEART RATE: 56 BPM | BODY MASS INDEX: 19.88 KG/M2 | SYSTOLIC BLOOD PRESSURE: 138 MMHG | DIASTOLIC BLOOD PRESSURE: 70 MMHG | HEIGHT: 62 IN | WEIGHT: 108 LBS | RESPIRATION RATE: 14 BRPM

## 2018-11-19 DIAGNOSIS — I10 ESSENTIAL HYPERTENSION: Primary | ICD-10-CM

## 2018-11-19 DIAGNOSIS — L98.9 SKIN LESION OF LEFT LEG: ICD-10-CM

## 2018-11-19 PROCEDURE — 99213 OFFICE O/P EST LOW 20 MIN: CPT | Performed by: INTERNAL MEDICINE

## 2018-11-19 RX ORDER — PROPRANOLOL HYDROCHLORIDE 20 MG/1
20 TABLET ORAL 2 TIMES DAILY
Qty: 90 TABLET | Refills: 3 | COMMUNITY
Start: 2018-11-19 | End: 2019-07-05

## 2018-11-19 RX ORDER — AMLODIPINE BESYLATE 5 MG/1
5 TABLET ORAL DAILY
Qty: 30 TABLET | Refills: 3 | Status: SHIPPED | OUTPATIENT
Start: 2018-11-19 | End: 2019-04-06

## 2018-11-19 NOTE — PROGRESS NOTES
Shalini De La Garza is a 77year old female. Patient presents with: Follow - Up: Pt is here for a 6 week f/u on blood pressure.  LB-rm 4      HPI:       Patient with PBC with portal HTN, CAD, essential HTN here for f/u -  HTN- BP at [facility] 130s-140s/70s, some (125 mcg total) by mouth every 4 (four) hours as needed for Cramping.  Disp: 20 tablet Rfl: 0      Past Medical History:   Diagnosis Date   • Abnormal TSH     \"borderline\"   • Arthritis    • Arthritis     hands   • Cholelithiasis    • Clostridium difficil distress  SKIN: left upper leg irregularly shaped hyperpigmented lesion with skin tag in the middle  HEENT: atraumatic, normocephalic  NECK: supple,no adenopathy  LUNGS: normal rate without respiratory distress, lungs clear to auscultation  CARDIO: RRR nl

## 2018-11-21 PROCEDURE — 81001 URINALYSIS AUTO W/SCOPE: CPT | Performed by: UROLOGY

## 2018-11-21 PROCEDURE — 87086 URINE CULTURE/COLONY COUNT: CPT | Performed by: UROLOGY

## 2019-01-25 ENCOUNTER — LAB ENCOUNTER (OUTPATIENT)
Dept: LAB | Facility: HOSPITAL | Age: 69
End: 2019-01-25
Attending: INTERNAL MEDICINE
Payer: MEDICARE

## 2019-01-25 ENCOUNTER — APPOINTMENT (OUTPATIENT)
Dept: HEMATOLOGY/ONCOLOGY | Facility: HOSPITAL | Age: 69
End: 2019-01-25
Attending: INTERNAL MEDICINE
Payer: MEDICARE

## 2019-01-25 ENCOUNTER — HOSPITAL ENCOUNTER (OUTPATIENT)
Dept: MRI IMAGING | Facility: HOSPITAL | Age: 69
Discharge: HOME | End: 2019-01-25
Attending: INTERNAL MEDICINE
Payer: MEDICARE

## 2019-01-25 DIAGNOSIS — K74.3 PRIMARY BILIARY CHOLANGITIS (HCC): ICD-10-CM

## 2019-01-25 DIAGNOSIS — N28.9 RENAL INSUFFICIENCY: ICD-10-CM

## 2019-01-25 DIAGNOSIS — K74.3 HEPATIC CIRRHOSIS DUE TO PRIMARY BILIARY CHOLANGITIS (HCC): ICD-10-CM

## 2019-01-25 DIAGNOSIS — D72.819 LEUKOPENIA, UNSPECIFIED TYPE: ICD-10-CM

## 2019-01-25 LAB
AFP-TM SERPL-MCNC: 8 NG/ML (ref ?–8)
ALBUMIN SERPL-MCNC: 3.6 G/DL (ref 3.1–4.5)
ALBUMIN/GLOB SERPL: 0.7 {RATIO} (ref 1–2)
ALP LIVER SERPL-CCNC: 157 U/L (ref 55–142)
ALT SERPL-CCNC: 44 U/L (ref 14–54)
ANION GAP SERPL CALC-SCNC: 6 MMOL/L (ref 0–18)
AST SERPL-CCNC: 72 U/L (ref 15–41)
BASOPHILS # BLD AUTO: 0.02 X10(3) UL (ref 0–0.1)
BASOPHILS NFR BLD AUTO: 0.7 %
BILIRUB SERPL-MCNC: 1.3 MG/DL (ref 0.1–2)
BUN BLD-MCNC: 19 MG/DL (ref 8–20)
BUN/CREAT SERPL: 16.4 (ref 10–20)
CALCIUM BLD-MCNC: 9.6 MG/DL (ref 8.3–10.3)
CHLORIDE SERPL-SCNC: 107 MMOL/L (ref 101–111)
CO2 SERPL-SCNC: 28 MMOL/L (ref 22–32)
CREAT BLD-MCNC: 1.16 MG/DL (ref 0.55–1.02)
EOSINOPHIL # BLD AUTO: 0.17 X10(3) UL (ref 0–0.3)
EOSINOPHIL NFR BLD AUTO: 5.9 %
ERYTHROCYTE [DISTWIDTH] IN BLOOD BY AUTOMATED COUNT: 11.9 % (ref 11.5–16)
GLOBULIN PLAS-MCNC: 5 G/DL (ref 2.8–4.4)
GLUCOSE BLD-MCNC: 84 MG/DL (ref 70–99)
HAV IGM SER QL: NONREACTIVE
HBV CORE AB SERPL QL IA: NONREACTIVE
HBV SURFACE AB SER QL: REACTIVE
HBV SURFACE AB SERPL IA-ACNC: 195.49 MIU/ML
HCT VFR BLD AUTO: 40.5 % (ref 34–50)
HEPATITIS A VIRUS AB, TOTAL: REACTIVE
HGB BLD-MCNC: 14.2 G/DL (ref 12–16)
IMMATURE GRANULOCYTE COUNT: 0 X10(3) UL (ref 0–1)
IMMATURE GRANULOCYTE RATIO %: 0 %
IMMUNOGLOBULIN A: 514 MG/DL (ref 70–312)
IMMUNOGLOBULIN G: 2250 MG/DL (ref 791–1643)
IMMUNOGLOBULIN M: 283 MG/DL (ref 43–279)
INR BLD: 1.09 (ref 0.9–1.1)
LYMPHOCYTES # BLD AUTO: 0.73 X10(3) UL (ref 0.9–4)
LYMPHOCYTES NFR BLD AUTO: 25.5 %
M PROTEIN MFR SERPL ELPH: 8.6 G/DL (ref 6.4–8.2)
MCH RBC QN AUTO: 34.5 PG (ref 27–33.2)
MCHC RBC AUTO-ENTMCNC: 35.1 G/DL (ref 31–37)
MCV RBC AUTO: 98.3 FL (ref 81–100)
MONOCYTES # BLD AUTO: 0.29 X10(3) UL (ref 0.1–1)
MONOCYTES NFR BLD AUTO: 10.1 %
NEUTROPHIL ABS PRELIM: 1.65 X10 (3) UL (ref 1.3–6.7)
NEUTROPHILS # BLD AUTO: 1.65 X10(3) UL (ref 1.3–6.7)
NEUTROPHILS NFR BLD AUTO: 57.8 %
OSMOLALITY SERPL CALC.SUM OF ELEC: 293 MOSM/KG (ref 275–295)
PLATELET # BLD AUTO: 116 10(3)UL (ref 150–450)
POTASSIUM SERPL-SCNC: 3.9 MMOL/L (ref 3.6–5.1)
PSA SERPL DL<=0.01 NG/ML-MCNC: 14.5 SECONDS (ref 12.4–14.7)
RBC # BLD AUTO: 4.12 X10(6)UL (ref 3.8–5.1)
RED CELL DISTRIBUTION WIDTH-SD: 43.5 FL (ref 35.1–46.3)
SODIUM SERPL-SCNC: 141 MMOL/L (ref 136–144)
WBC # BLD AUTO: 2.9 X10(3) UL (ref 4–13)

## 2019-01-25 PROCEDURE — 86709 HEPATITIS A IGM ANTIBODY: CPT

## 2019-01-25 PROCEDURE — 83883 ASSAY NEPHELOMETRY NOT SPEC: CPT

## 2019-01-25 PROCEDURE — 82784 ASSAY IGA/IGD/IGG/IGM EACH: CPT

## 2019-01-25 PROCEDURE — 86704 HEP B CORE ANTIBODY TOTAL: CPT

## 2019-01-25 PROCEDURE — 82105 ALPHA-FETOPROTEIN SERUM: CPT

## 2019-01-25 PROCEDURE — 85025 COMPLETE CBC W/AUTO DIFF WBC: CPT

## 2019-01-25 PROCEDURE — 86334 IMMUNOFIX E-PHORESIS SERUM: CPT

## 2019-01-25 PROCEDURE — 85610 PROTHROMBIN TIME: CPT

## 2019-01-25 PROCEDURE — A9581 GADOXETATE DISODIUM INJ: HCPCS | Performed by: INTERNAL MEDICINE

## 2019-01-25 PROCEDURE — 74183 MRI ABD W/O CNTR FLWD CNTR: CPT | Performed by: INTERNAL MEDICINE

## 2019-01-25 PROCEDURE — 86706 HEP B SURFACE ANTIBODY: CPT

## 2019-01-25 PROCEDURE — 80053 COMPREHEN METABOLIC PANEL: CPT

## 2019-01-25 PROCEDURE — 36415 COLL VENOUS BLD VENIPUNCTURE: CPT

## 2019-01-25 PROCEDURE — 86708 HEPATITIS A ANTIBODY: CPT

## 2019-01-25 PROCEDURE — 84165 PROTEIN E-PHORESIS SERUM: CPT

## 2019-01-30 LAB
ALBUMIN SERPL-MCNC: 4.34 G/DL (ref 3.1–4.5)
ALBUMIN/GLOB SERPL: 1.07 {RATIO}
ALPHA1 GLOB SERPL ELPH-MCNC: 0.23 G/DL (ref 0.1–0.3)
ALPHA2 GLOB SERPL ELPH-MCNC: 0.83 G/DL (ref 0.6–1)
B-GLOBULIN SERPL ELPH-MCNC: 0.66 G/DL (ref 0.7–1.2)
GAMMA GLOB SERPL ELPH-MCNC: 2.34 G/DL (ref 0.6–1.6)
KAPPA FREE LIGHT CHAIN: 9.28 MG/DL (ref 0.33–1.94)
KAPPA/LAMBDA FLC RATIO: 2.05 (ref 0.26–1.65)
LAMBDA FREE LIGHT CHAIN: 4.52 MG/DL (ref 0.57–2.63)
MAI PROTEIN SERPL-MCNC: 8.4 G/DL (ref 6.1–8.3)

## 2019-02-01 ENCOUNTER — OFFICE VISIT (OUTPATIENT)
Dept: HEMATOLOGY/ONCOLOGY | Facility: HOSPITAL | Age: 69
End: 2019-02-01
Attending: INTERNAL MEDICINE
Payer: MEDICARE

## 2019-02-01 VITALS
HEIGHT: 62.01 IN | HEART RATE: 55 BPM | SYSTOLIC BLOOD PRESSURE: 122 MMHG | OXYGEN SATURATION: 99 % | TEMPERATURE: 98 F | BODY MASS INDEX: 20.17 KG/M2 | DIASTOLIC BLOOD PRESSURE: 71 MMHG | WEIGHT: 111 LBS | RESPIRATION RATE: 18 BRPM

## 2019-02-01 DIAGNOSIS — D47.2 MGUS (MONOCLONAL GAMMOPATHY OF UNKNOWN SIGNIFICANCE): Primary | ICD-10-CM

## 2019-02-01 PROCEDURE — 99213 OFFICE O/P EST LOW 20 MIN: CPT | Performed by: INTERNAL MEDICINE

## 2019-02-01 NOTE — PROGRESS NOTES
Cancer Center Progress Note  Patient Name: Greer Severin   YOB: 1950   Medical Record Number: [id number]     Attending Physician: Kashmir Khoury M.D. Date of Visit: 2/1/2019     Chief Complaint:  Patient presents with:   Follow 02/16/2010   • COLONOSCOPY     • COLONOSCOPY N/A 7/31/2018    Performed by Otto Liz MD at [facility] ENDOSCOPY   • ESOPHAGOGASTRODUODENOSCOPY (EGD) N/A 7/31/2018    Performed by Otto Liz MD at [address] [place] [address] Yes        Occupational Exposure: Not Asked        Hobby Hazards: Not Asked        Sleep Concern: Not Asked        Stress Concern: Yes        Weight Concern: No        Special Diet: No        Back Care: Not Asked        Exercise: No        Bike Helmet: Not diplopia. No yellowing. Hematologic/Lymphatic Normal - No easy bruising or bleeding. No tender or palpable lymph nodes. Respiratory No dyspnea, pleuritic chest pain, cough or hemoptysis.    Cardiovascular No anginal chest pain, palpitations or orthopne Sodium Latest Ref Range: 136 - 144 mmol/L 141    Potassium Latest Ref Range: 3.6 - 5.1 mmol/L 3.9    Chloride Latest Ref Range: 101 - 111 mmol/L 107    Carbon Dioxide, Total Latest Ref Range: 22.0 - 32.0 mmol/L 28.0    BUN Latest Ref Range: 8 - 20 mg/dL Plan:  Leukopenia and Thrombocytopenia: The patient has splenomegaly and liver disease that likely explain the low platelets and WBC. Counts wax and wane.  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

## 2019-02-20 ENCOUNTER — OFFICE VISIT (OUTPATIENT)
Dept: SURGERY | Facility: CLINIC | Age: 69
End: 2019-02-20
Payer: MEDICARE

## 2019-02-20 VITALS
DIASTOLIC BLOOD PRESSURE: 71 MMHG | OXYGEN SATURATION: 100 % | HEART RATE: 55 BPM | HEIGHT: 62.01 IN | SYSTOLIC BLOOD PRESSURE: 139 MMHG | RESPIRATION RATE: 16 BRPM | BODY MASS INDEX: 20.62 KG/M2 | WEIGHT: 113.5 LBS | TEMPERATURE: 96 F

## 2019-02-20 DIAGNOSIS — K74.3 PRIMARY BILIARY CHOLANGITIS (HCC): Primary | ICD-10-CM

## 2019-02-20 NOTE — PROGRESS NOTES
[facility]  [address] [facility], [address]  [address]  [phone] ([phone]) retention, jaundice, pruritis, GI bleeding, vomiting, change in bowel habits or characteristics. Is seeing  Rheumatology for osteoporosis. ROS:  A comprehensive 10 point review of systems was completed.   Pertinent positives and negatives noted in the (ZOFRAN) 4 mg tablet Take 1 tablet (4 mg total) by mouth every 8 (eight) hours as needed for Nausea. Disp: 20 tablet Rfl: 0   Hyoscyamine Sulfate (LEVSIN) 0.125 MG Oral Tab Take 1 tablet (125 mcg total) by mouth every 4 (four) hours as needed for Cramping. cirrhosis  -  Continue ursodiol; discussed possible use of obeticolich acid; however, given stage of cirrhosis and expense of medication the plan is  to continue with ursodiol.   (Studies indicate better outcomes with obeticolich acid in cases of only small

## 2019-02-25 NOTE — PROGRESS NOTES
PBC cirrhosis, well compensated. There has been concern for possible AIH-PBC overlap over the years but prior biopsies x 2 in the setting of +MARCO returned as typical PBC.  It seems unlikely there would be concurrent AIH at this point and even if there was t

## 2019-04-04 ENCOUNTER — APPOINTMENT (OUTPATIENT)
Dept: LAB | Facility: HOSPITAL | Age: 69
End: 2019-04-04
Attending: INTERNAL MEDICINE
Payer: MEDICARE

## 2019-04-04 DIAGNOSIS — K74.3 PRIMARY BILIARY CHOLANGITIS (HCC): ICD-10-CM

## 2019-04-04 PROCEDURE — 80053 COMPREHEN METABOLIC PANEL: CPT

## 2019-04-04 PROCEDURE — 36415 COLL VENOUS BLD VENIPUNCTURE: CPT

## 2019-04-08 RX ORDER — AMLODIPINE BESYLATE 5 MG/1
TABLET ORAL
Qty: 90 TABLET | Refills: 0 | Status: SHIPPED | OUTPATIENT
Start: 2019-04-08 | End: 2019-07-11

## 2019-04-11 ENCOUNTER — TELEPHONE (OUTPATIENT)
Dept: INTERNAL MEDICINE CLINIC | Facility: CLINIC | Age: 69
End: 2019-04-11

## 2019-04-11 ENCOUNTER — TELEPHONE (OUTPATIENT)
Dept: SURGERY | Facility: CLINIC | Age: 69
End: 2019-04-11

## 2019-04-11 DIAGNOSIS — R79.89 ELEVATED LIVER FUNCTION TESTS: Primary | ICD-10-CM

## 2019-04-11 RX ORDER — PREDNISONE 20 MG/1
TABLET ORAL
Qty: 25 TABLET | Refills: 0 | Status: SHIPPED | OUTPATIENT
Start: 2019-04-11 | End: 2019-05-16

## 2019-04-11 NOTE — TELEPHONE ENCOUNTER
Spoke with Dr. Shaq Lakhani and to patient about increasing liver tests. Will try brief course of prednisone to assess for any co-existent AIH.  Will Rx ~ 14 day course with repeat LFT after and then decide if adding immunosuppression might be beneficial. Patient

## 2019-04-25 ENCOUNTER — APPOINTMENT (OUTPATIENT)
Dept: LAB | Facility: HOSPITAL | Age: 69
End: 2019-04-25
Attending: INTERNAL MEDICINE
Payer: MEDICARE

## 2019-04-25 DIAGNOSIS — R79.89 ELEVATED LIVER FUNCTION TESTS: ICD-10-CM

## 2019-04-25 PROCEDURE — 80076 HEPATIC FUNCTION PANEL: CPT

## 2019-04-25 PROCEDURE — 36415 COLL VENOUS BLD VENIPUNCTURE: CPT

## 2019-04-27 ENCOUNTER — TELEPHONE (OUTPATIENT)
Dept: SURGERY | Facility: CLINIC | Age: 69
End: 2019-04-27

## 2019-04-27 DIAGNOSIS — K75.4 AUTOIMMUNE HEPATITIS (HCC): Primary | ICD-10-CM

## 2019-04-27 NOTE — TELEPHONE ENCOUNTER
Discussed with patient. LFT significantly improved with prednisone, however review of past LFT also show periodic normalization so could just be a coincidence, however suspect it is real given how quickly the LFT fell.  She will be done with prednisone tape

## 2019-04-29 ENCOUNTER — LAB ENCOUNTER (OUTPATIENT)
Dept: LAB | Facility: HOSPITAL | Age: 69
End: 2019-04-29
Attending: INTERNAL MEDICINE
Payer: MEDICARE

## 2019-04-29 DIAGNOSIS — K75.4 AUTOIMMUNE HEPATITIS (HCC): ICD-10-CM

## 2019-04-29 PROCEDURE — 82657 ENZYME CELL ACTIVITY: CPT

## 2019-04-29 PROCEDURE — 36415 COLL VENOUS BLD VENIPUNCTURE: CPT

## 2019-04-29 PROCEDURE — 80076 HEPATIC FUNCTION PANEL: CPT

## 2019-04-29 PROCEDURE — 85025 COMPLETE CBC W/AUTO DIFF WBC: CPT

## 2019-05-10 RX ORDER — PROPRANOLOL HYDROCHLORIDE 20 MG/1
TABLET ORAL
Qty: 90 TABLET | Refills: 0 | Status: SHIPPED | OUTPATIENT
Start: 2019-05-10 | End: 2019-05-20 | Stop reason: ALTCHOICE

## 2019-05-15 ENCOUNTER — HOSPITAL ENCOUNTER (OUTPATIENT)
Dept: CV DIAGNOSTICS | Facility: HOSPITAL | Age: 69
Discharge: HOME | End: 2019-05-15
Attending: INTERNAL MEDICINE
Payer: MEDICARE

## 2019-05-15 DIAGNOSIS — R60.0 LOCALIZED EDEMA: ICD-10-CM

## 2019-05-15 DIAGNOSIS — I25.10 CORONARY ARTERY DISEASE INVOLVING NATIVE CORONARY ARTERY OF NATIVE HEART WITHOUT ANGINA PECTORIS: ICD-10-CM

## 2019-05-15 DIAGNOSIS — I34.0 NON-RHEUMATIC MITRAL REGURGITATION: ICD-10-CM

## 2019-05-15 PROCEDURE — 93306 TTE W/DOPPLER COMPLETE: CPT | Performed by: INTERNAL MEDICINE

## 2019-05-16 DIAGNOSIS — K75.4 AUTOIMMUNE HEPATITIS (HCC): Primary | ICD-10-CM

## 2019-05-16 RX ORDER — AZATHIOPRINE 50 MG/1
25 TABLET ORAL DAILY
Qty: 30 TABLET | Refills: 3 | Status: SHIPPED | OUTPATIENT
Start: 2019-05-16 | End: 2020-01-28

## 2019-05-17 ENCOUNTER — HOSPITAL ENCOUNTER (OUTPATIENT)
Age: 69
Discharge: HOME | End: 2019-05-17
Payer: MEDICARE

## 2019-05-17 ENCOUNTER — APPOINTMENT (OUTPATIENT)
Dept: GENERAL RADIOLOGY | Age: 69
End: 2019-05-17
Attending: PHYSICIAN ASSISTANT
Payer: MEDICARE

## 2019-05-17 VITALS
SYSTOLIC BLOOD PRESSURE: 124 MMHG | RESPIRATION RATE: 16 BRPM | WEIGHT: 114 LBS | BODY MASS INDEX: 21 KG/M2 | TEMPERATURE: 98 F | DIASTOLIC BLOOD PRESSURE: 56 MMHG | OXYGEN SATURATION: 99 % | HEART RATE: 54 BPM

## 2019-05-17 DIAGNOSIS — S93.602A FOOT SPRAIN, LEFT, INITIAL ENCOUNTER: ICD-10-CM

## 2019-05-17 DIAGNOSIS — S90.32XA CONTUSION OF LEFT FOOT, INITIAL ENCOUNTER: Primary | ICD-10-CM

## 2019-05-17 PROCEDURE — 99213 OFFICE O/P EST LOW 20 MIN: CPT

## 2019-05-17 PROCEDURE — 99203 OFFICE O/P NEW LOW 30 MIN: CPT

## 2019-05-17 PROCEDURE — 73630 X-RAY EXAM OF FOOT: CPT | Performed by: PHYSICIAN ASSISTANT

## 2019-05-17 NOTE — ED PROVIDER NOTES
Patient Seen in: [facility]    History   Patient presents with:  Lower Extremity Injury (musculoskeletal)    Stated Complaint: Foot Injury    JORDY Encarnacion is a 78-year-old female who presents today for evaluation of left foot in 2.00        Types: Cigarettes      Smokeless tobacco: Never Used      Tobacco comment: Very light smoker briefly in my 19's & late 29's    Alcohol use: Yes      Frequency: Monthly or less      Drinks per session: 1 or 2      Binge frequency: Never      Com 5/17/2019  PROCEDURE:  XR FOOT, COMPLETE (MIN 3 VIEWS), LEFT (CPT=73630)  TECHNIQUE:  AP, oblique, and lateral views were obtained. COMPARISON:  None.   INDICATIONS:  Foot Injury  PATIENT STATED HISTORY: (As transcribed by Technologist)  Patient states more diagnosis)  Foot sprain, left, initial encounter    Disposition:  Discharge  5/17/2019  7:10 pm    Follow-up:  Jesscia Ferraro MD  [address] [phone]    Schedule an appointment as soon as possible for a visit

## 2019-05-17 NOTE — ED INITIAL ASSESSMENT (HPI)
Patient states that 1 1/2 weeks ago while walking down stairs she slipped down the last 2 steps sustaining a injury to her left 2nd toe and left foot. Patient states that her toes folded under her.  Swelling with ecchymosis noted to the left 2nd toe and top

## 2019-05-20 ENCOUNTER — OFFICE VISIT (OUTPATIENT)
Dept: INTERNAL MEDICINE CLINIC | Facility: CLINIC | Age: 69
End: 2019-05-20
Payer: MEDICARE

## 2019-05-20 VITALS
TEMPERATURE: 98 F | SYSTOLIC BLOOD PRESSURE: 116 MMHG | HEIGHT: 62 IN | DIASTOLIC BLOOD PRESSURE: 56 MMHG | RESPIRATION RATE: 16 BRPM | BODY MASS INDEX: 21.16 KG/M2 | HEART RATE: 60 BPM | WEIGHT: 115 LBS

## 2019-05-20 DIAGNOSIS — S90.32XA CONTUSION OF LEFT FOOT, INITIAL ENCOUNTER: ICD-10-CM

## 2019-05-20 DIAGNOSIS — M79.89 SWELLING OF LEFT FOOT: Primary | ICD-10-CM

## 2019-05-20 PROCEDURE — 99213 OFFICE O/P EST LOW 20 MIN: CPT | Performed by: INTERNAL MEDICINE

## 2019-05-20 NOTE — PROGRESS NOTES
Cris Sadler is a 77year old female. Patient presents with: Foot Injury: SN Rm 4; L foot swelling/pain, tripped on steps 2 wks ago, had XR, no Fx      HPI:     2 weeks ago, patient tripped in her garage going down 2 steps.  Rolled her left foot and f daily. Disp: 45 tablet Rfl: 11   Vitamin B-12 1000 MCG Oral Tab Take 1,000 mcg by mouth daily. Disp:  Rfl:    aspirin 81 MG Oral Tab Take 81 mg by mouth daily. Disp:  Rfl:    Vitamin D3 1000 UNITS Oral Tab Take 5,000 Units by mouth daily.  Disp:  Rfl:    ib Paternal Grandfather    • Heart Disease Other    • Cancer Neg    • Stroke Neg         Allergies    Ciprofloxacin           OTHER (SEE COMMENTS)    Comment:Caused vaginal pain and itching.       REVIEW OF SYSTEMS:   GENERAL HEALTH:  no fevers or chills  SKIN

## 2019-06-10 ENCOUNTER — LAB ENCOUNTER (OUTPATIENT)
Dept: LAB | Facility: HOSPITAL | Age: 69
End: 2019-06-10
Attending: INTERNAL MEDICINE
Payer: MEDICARE

## 2019-06-10 DIAGNOSIS — K75.4 AUTOIMMUNE HEPATITIS (HCC): ICD-10-CM

## 2019-06-10 DIAGNOSIS — K74.3 PRIMARY BILIARY CHOLANGITIS (HCC): ICD-10-CM

## 2019-06-10 PROCEDURE — 80053 COMPREHEN METABOLIC PANEL: CPT

## 2019-06-10 PROCEDURE — 82784 ASSAY IGA/IGD/IGG/IGM EACH: CPT

## 2019-06-10 PROCEDURE — 36415 COLL VENOUS BLD VENIPUNCTURE: CPT

## 2019-06-10 PROCEDURE — 85610 PROTHROMBIN TIME: CPT

## 2019-06-10 PROCEDURE — 85025 COMPLETE CBC W/AUTO DIFF WBC: CPT

## 2019-06-10 PROCEDURE — 82248 BILIRUBIN DIRECT: CPT

## 2019-06-14 DIAGNOSIS — K75.4 AUTOIMMUNE HEPATITIS (HCC): Primary | ICD-10-CM

## 2019-06-14 DIAGNOSIS — K74.3 PRIMARY BILIARY CHOLANGITIS (HCC): ICD-10-CM

## 2019-06-26 ENCOUNTER — OFFICE VISIT (OUTPATIENT)
Dept: INTERNAL MEDICINE CLINIC | Facility: CLINIC | Age: 69
End: 2019-06-26
Payer: MEDICARE

## 2019-06-26 ENCOUNTER — LAB ENCOUNTER (OUTPATIENT)
Dept: LAB | Age: 69
End: 2019-06-26
Attending: INTERNAL MEDICINE
Payer: MEDICARE

## 2019-06-26 VITALS
TEMPERATURE: 99 F | DIASTOLIC BLOOD PRESSURE: 48 MMHG | SYSTOLIC BLOOD PRESSURE: 110 MMHG | HEART RATE: 80 BPM | BODY MASS INDEX: 21 KG/M2 | HEIGHT: 62 IN

## 2019-06-26 DIAGNOSIS — R60.0 BILATERAL LOWER EXTREMITY EDEMA: ICD-10-CM

## 2019-06-26 DIAGNOSIS — Z13.29 THYROID DISORDER SCREENING: ICD-10-CM

## 2019-06-26 DIAGNOSIS — K75.4 AUTOIMMUNE HEPATITIS (HCC): ICD-10-CM

## 2019-06-26 DIAGNOSIS — G56.01 RIGHT CARPAL TUNNEL SYNDROME: ICD-10-CM

## 2019-06-26 DIAGNOSIS — R60.0 BILATERAL LOWER EXTREMITY EDEMA: Primary | ICD-10-CM

## 2019-06-26 DIAGNOSIS — K74.3 PRIMARY BILIARY CHOLANGITIS (HCC): ICD-10-CM

## 2019-06-26 DIAGNOSIS — I10 ESSENTIAL HYPERTENSION: ICD-10-CM

## 2019-06-26 PROCEDURE — 84443 ASSAY THYROID STIM HORMONE: CPT

## 2019-06-26 PROCEDURE — 85025 COMPLETE CBC W/AUTO DIFF WBC: CPT

## 2019-06-26 PROCEDURE — 84439 ASSAY OF FREE THYROXINE: CPT

## 2019-06-26 PROCEDURE — 80076 HEPATIC FUNCTION PANEL: CPT

## 2019-06-26 PROCEDURE — 99214 OFFICE O/P EST MOD 30 MIN: CPT | Performed by: INTERNAL MEDICINE

## 2019-06-26 RX ORDER — FUROSEMIDE 20 MG/1
20 TABLET ORAL DAILY PRN
Qty: 10 TABLET | Refills: 0 | Status: SHIPPED | OUTPATIENT
Start: 2019-06-26 | End: 2020-01-21 | Stop reason: ALTCHOICE

## 2019-06-26 NOTE — PROGRESS NOTES
Maggy MARIO Stump  6/10/1950    Patient presents with:  Swelling: LG. Room 1. ankle/mid leg swelling for several months on and off but since about 3-4 weeks its been constant  Numbness: Right hand numbness and right wrist pain for 3 weeks       SUBJECTIVE tablet Rfl: 1   Propranolol HCl 20 MG Oral Tab Take 1 tablet (20 mg total) by mouth 2 (two) times daily. Disp: 90 tablet Rfl: 3   Ondansetron HCl (ZOFRAN) 4 mg tablet Take 1 tablet (4 mg total) by mouth every 8 (eight) hours as needed for Nausea.  Disp: 20 Essential hypertension     Post-menopausal bleeding     Encounter for annual routine gynecological examination     Past Surgical History:   Procedure Laterality Date   • ANGIOGRAM     • COLONOSCOPY  02/16/2010   • COLONOSCOPY     • COLONOSCOPY N/A 7/31/20 and history of low normal TSH, thyroid dysfunction is suspected, for which thyroid function test have been ordered  Serum albumin level is 3.4, unlikely to be contributing  Suspect an element of venous insufficiency for which bilateral ultrasounds have bee

## 2019-06-27 RX ORDER — PROPRANOLOL HYDROCHLORIDE 20 MG/1
TABLET ORAL
Qty: 90 TABLET | Refills: 0 | OUTPATIENT
Start: 2019-06-27

## 2019-06-28 ENCOUNTER — OFFICE VISIT (OUTPATIENT)
Dept: INTERNAL MEDICINE CLINIC | Facility: CLINIC | Age: 69
End: 2019-06-28
Payer: MEDICARE

## 2019-06-28 VITALS
SYSTOLIC BLOOD PRESSURE: 112 MMHG | DIASTOLIC BLOOD PRESSURE: 60 MMHG | HEART RATE: 68 BPM | RESPIRATION RATE: 16 BRPM | BODY MASS INDEX: 21.35 KG/M2 | TEMPERATURE: 98 F | HEIGHT: 62 IN | WEIGHT: 116 LBS

## 2019-06-28 DIAGNOSIS — K74.3 PRIMARY BILIARY CHOLANGITIS (HCC): ICD-10-CM

## 2019-06-28 DIAGNOSIS — E03.8 SUBCLINICAL HYPOTHYROIDISM: ICD-10-CM

## 2019-06-28 DIAGNOSIS — G56.01 RIGHT CARPAL TUNNEL SYNDROME: ICD-10-CM

## 2019-06-28 DIAGNOSIS — R60.0 BILATERAL LOWER EXTREMITY EDEMA: Primary | ICD-10-CM

## 2019-06-28 PROCEDURE — 99213 OFFICE O/P EST LOW 20 MIN: CPT | Performed by: INTERNAL MEDICINE

## 2019-06-28 RX ORDER — LEVOTHYROXINE SODIUM 0.03 MG/1
25 TABLET ORAL
Qty: 30 TABLET | Refills: 0 | Status: SHIPPED | OUTPATIENT
Start: 2019-06-28 | End: 2019-07-26

## 2019-06-28 NOTE — PROGRESS NOTES
Yohana MARIO Stump  6/10/1950    Patient presents with:  Test Results: SN Rm 7; review lab results      Karlene Sanchez is a 68year old female who presents as a follow-up.     The patient recently presented to the office for bilateral lower ext Take 1,000 mcg by mouth daily. Disp:  Rfl:    aspirin 81 MG Oral Tab Take 81 mg by mouth daily. Disp:  Rfl:    Vitamin D3 1000 UNITS Oral Tab Take 5,000 Units by mouth daily.  Disp:  Rfl:    ibuprofen (ADVIL) 200 MG Oral Tab Take 200 mg by mouth every 6 (si   1973, [id number],  [address]      as a child   • UPPER GI ENDOSCOPY,EXAM  2010      Social History    Tobacco Use      Smoking status: Former Smoker        Years: 2.00        Types: Cigarettes      Smokeless tobacco: Never Used    [employer] level  Scheduled for hepatology service follow-up    The patient indicates understanding of these issues and agrees to the plan.     TODAY'S ORDERS     Orders Placed This Encounter      TSH W Reflex To Free T4 [E]      Meds & Refills:  Requested Prescriptio

## 2019-07-02 ENCOUNTER — PATIENT MESSAGE (OUTPATIENT)
Dept: INTERNAL MEDICINE CLINIC | Facility: CLINIC | Age: 69
End: 2019-07-02

## 2019-07-05 NOTE — TELEPHONE ENCOUNTER
From: Denise Phoenix  To: Luz Maria Garcia MD  Sent: 7/2/2019 2:23 PM CDT  Subject: Prescription Question    Hi Dr. Michelle Owusu,  Regarding propranolol. Dakotah Anam Dakotah Glenmorabarbie Claire Glenmora I was started on it 2x/day but inadequate BP control so increased to 3x/day.  Then changed back to 2x/da

## 2019-07-05 NOTE — TELEPHONE ENCOUNTER
TB- please review and advise:    Per OV note on 5/20/19, it appears dosing was changed for Propranolol 20 mg TID to BID. If correct, pended new rx to send to [facility].

## 2019-07-07 RX ORDER — PROPRANOLOL HYDROCHLORIDE 20 MG/1
20 TABLET ORAL 2 TIMES DAILY
Qty: 180 TABLET | Refills: 0 | Status: SHIPPED | OUTPATIENT
Start: 2019-07-07 | End: 2019-10-22

## 2019-07-12 DIAGNOSIS — K75.4 AUTOIMMUNE HEPATITIS (HCC): Primary | ICD-10-CM

## 2019-07-12 RX ORDER — AMLODIPINE BESYLATE 5 MG/1
TABLET ORAL
Qty: 90 TABLET | Refills: 0 | Status: SHIPPED | OUTPATIENT
Start: 2019-07-12 | End: 2019-09-04 | Stop reason: ALTCHOICE

## 2019-07-26 ENCOUNTER — LAB ENCOUNTER (OUTPATIENT)
Dept: LAB | Facility: HOSPITAL | Age: 69
End: 2019-07-26
Attending: INTERNAL MEDICINE
Payer: MEDICARE

## 2019-07-26 DIAGNOSIS — K74.3 PRIMARY BILIARY CHOLANGITIS (HCC): ICD-10-CM

## 2019-07-26 DIAGNOSIS — D47.2 MGUS (MONOCLONAL GAMMOPATHY OF UNKNOWN SIGNIFICANCE): ICD-10-CM

## 2019-07-26 DIAGNOSIS — K75.4 AUTOIMMUNE HEPATITIS (HCC): ICD-10-CM

## 2019-07-26 DIAGNOSIS — R60.0 BILATERAL LOWER EXTREMITY EDEMA: ICD-10-CM

## 2019-07-26 DIAGNOSIS — E03.8 SUBCLINICAL HYPOTHYROIDISM: ICD-10-CM

## 2019-07-26 DIAGNOSIS — E86.0 DEHYDRATION: ICD-10-CM

## 2019-07-26 LAB
AFP-TM SERPL-MCNC: 7.3 NG/ML (ref ?–8)
ALBUMIN SERPL-MCNC: 3.4 G/DL (ref 3.4–5)
ALBUMIN/GLOB SERPL: 0.9 {RATIO} (ref 1–2)
ALP LIVER SERPL-CCNC: 109 U/L (ref 55–142)
ALT SERPL-CCNC: 19 U/L (ref 13–56)
ANION GAP SERPL CALC-SCNC: 6 MMOL/L (ref 0–18)
AST SERPL-CCNC: 33 U/L (ref 15–37)
BASOPHILS # BLD AUTO: 0.02 X10(3) UL (ref 0–0.2)
BASOPHILS NFR BLD AUTO: 0.8 %
BILIRUB SERPL-MCNC: 1.2 MG/DL (ref 0.1–2)
BUN BLD-MCNC: 16 MG/DL (ref 7–18)
BUN/CREAT SERPL: 15.8 (ref 10–20)
CALCIUM BLD-MCNC: 9.1 MG/DL (ref 8.5–10.1)
CHLORIDE SERPL-SCNC: 108 MMOL/L (ref 98–112)
CO2 SERPL-SCNC: 29 MMOL/L (ref 21–32)
CREAT BLD-MCNC: 1.01 MG/DL (ref 0.55–1.02)
DEPRECATED RDW RBC AUTO: 45.5 FL (ref 35.1–46.3)
EOSINOPHIL # BLD AUTO: 0.1 X10(3) UL (ref 0–0.7)
EOSINOPHIL NFR BLD AUTO: 4.1 %
ERYTHROCYTE [DISTWIDTH] IN BLOOD BY AUTOMATED COUNT: 12.4 % (ref 11–15)
GLOBULIN PLAS-MCNC: 4 G/DL (ref 2.8–4.4)
GLUCOSE BLD-MCNC: 93 MG/DL (ref 70–99)
HCT VFR BLD AUTO: 38 % (ref 35–48)
HGB BLD-MCNC: 12.9 G/DL (ref 12–16)
IGA SERPL-MCNC: 396 MG/DL (ref 70–312)
IGM SERPL-MCNC: 263 MG/DL (ref 43–279)
IMM GRANULOCYTES # BLD AUTO: 0 X10(3) UL (ref 0–1)
IMM GRANULOCYTES NFR BLD: 0 %
IMMUNOGLOBULIN PNL SER-MCNC: 1440 MG/DL (ref 791–1643)
LYMPHOCYTES # BLD AUTO: 0.47 X10(3) UL (ref 1–4)
LYMPHOCYTES NFR BLD AUTO: 19.4 %
M PROTEIN MFR SERPL ELPH: 7.4 G/DL (ref 6.4–8.2)
MCH RBC QN AUTO: 33.9 PG (ref 26–34)
MCHC RBC AUTO-ENTMCNC: 33.9 G/DL (ref 31–37)
MCV RBC AUTO: 99.7 FL (ref 80–100)
MONOCYTES # BLD AUTO: 0.3 X10(3) UL (ref 0.1–1)
MONOCYTES NFR BLD AUTO: 12.4 %
NEUTROPHILS # BLD AUTO: 1.53 X10 (3) UL (ref 1.5–7.7)
NEUTROPHILS # BLD AUTO: 1.53 X10(3) UL (ref 1.5–7.7)
NEUTROPHILS NFR BLD AUTO: 63.3 %
OSMOLALITY SERPL CALC.SUM OF ELEC: 297 MOSM/KG (ref 275–295)
PLATELET # BLD AUTO: 114 10(3)UL (ref 150–450)
POTASSIUM SERPL-SCNC: 4 MMOL/L (ref 3.5–5.1)
RBC # BLD AUTO: 3.81 X10(6)UL (ref 3.8–5.3)
SODIUM SERPL-SCNC: 143 MMOL/L (ref 136–145)
TSI SER-ACNC: 2.17 MIU/ML (ref 0.36–3.74)
WBC # BLD AUTO: 2.4 X10(3) UL (ref 4–11)

## 2019-07-26 PROCEDURE — 86334 IMMUNOFIX E-PHORESIS SERUM: CPT

## 2019-07-26 PROCEDURE — 84443 ASSAY THYROID STIM HORMONE: CPT

## 2019-07-26 PROCEDURE — 36415 COLL VENOUS BLD VENIPUNCTURE: CPT

## 2019-07-26 PROCEDURE — 84165 PROTEIN E-PHORESIS SERUM: CPT

## 2019-07-26 PROCEDURE — 82105 ALPHA-FETOPROTEIN SERUM: CPT

## 2019-07-26 PROCEDURE — 85025 COMPLETE CBC W/AUTO DIFF WBC: CPT

## 2019-07-26 PROCEDURE — 82784 ASSAY IGA/IGD/IGG/IGM EACH: CPT

## 2019-07-26 PROCEDURE — 80053 COMPREHEN METABOLIC PANEL: CPT

## 2019-07-26 PROCEDURE — 83883 ASSAY NEPHELOMETRY NOT SPEC: CPT

## 2019-07-26 RX ORDER — LEVOTHYROXINE SODIUM 0.03 MG/1
25 TABLET ORAL
Qty: 90 TABLET | Refills: 0 | Status: SHIPPED | OUTPATIENT
Start: 2019-07-26 | End: 2019-11-18

## 2019-07-31 LAB
ALBUMIN SERPL-MCNC: 4.08 G/DL (ref 3.1–4.5)
ALBUMIN/GLOB SERPL: 1.27 {RATIO}
ALPHA1 GLOB SERPL ELPH-MCNC: 0.19 G/DL (ref 0.1–0.3)
ALPHA2 GLOB SERPL ELPH-MCNC: 0.72 G/DL (ref 0.6–1)
B-GLOBULIN SERPL ELPH-MCNC: 0.53 G/DL (ref 0.7–1.2)
GAMMA GLOB SERPL ELPH-MCNC: 1.78 G/DL (ref 0.6–1.6)
KAPPA FREE LIGHT CHAIN: 6.48 MG/DL (ref 0.33–1.94)
KAPPA/LAMBDA FLC RATIO: 1.37 (ref 0.26–1.65)
LAMBDA FREE LIGHT CHAIN: 4.73 MG/DL (ref 0.57–2.63)
M PROTEIN MFR SERPL ELPH: 7.3 G/DL (ref 6.4–8.2)
M-SPIKE 1: 0.26 G/DL (ref ?–0)

## 2019-08-02 ENCOUNTER — TELEPHONE (OUTPATIENT)
Dept: INTERNAL MEDICINE CLINIC | Facility: CLINIC | Age: 69
End: 2019-08-02

## 2019-08-02 ENCOUNTER — OFFICE VISIT (OUTPATIENT)
Dept: HEMATOLOGY/ONCOLOGY | Facility: HOSPITAL | Age: 69
End: 2019-08-02
Attending: INTERNAL MEDICINE
Payer: MEDICARE

## 2019-08-02 VITALS
RESPIRATION RATE: 16 BRPM | DIASTOLIC BLOOD PRESSURE: 59 MMHG | HEART RATE: 58 BPM | BODY MASS INDEX: 20.81 KG/M2 | SYSTOLIC BLOOD PRESSURE: 120 MMHG | TEMPERATURE: 98 F | HEIGHT: 62.01 IN | WEIGHT: 114.5 LBS | OXYGEN SATURATION: 98 %

## 2019-08-02 DIAGNOSIS — D47.2 MGUS (MONOCLONAL GAMMOPATHY OF UNKNOWN SIGNIFICANCE): Primary | ICD-10-CM

## 2019-08-02 DIAGNOSIS — D69.6 THROMBOCYTOPENIA (HCC): ICD-10-CM

## 2019-08-02 DIAGNOSIS — I25.10 CORONARY ARTERY DISEASE INVOLVING NATIVE CORONARY ARTERY OF NATIVE HEART WITHOUT ANGINA PECTORIS: ICD-10-CM

## 2019-08-02 DIAGNOSIS — I10 ESSENTIAL HYPERTENSION: ICD-10-CM

## 2019-08-02 DIAGNOSIS — Z00.00 ROUTINE GENERAL MEDICAL EXAMINATION AT A HEALTH CARE FACILITY: Primary | ICD-10-CM

## 2019-08-02 PROCEDURE — 99213 OFFICE O/P EST LOW 20 MIN: CPT | Performed by: INTERNAL MEDICINE

## 2019-08-02 NOTE — PROGRESS NOTES
Cancer Center Progress Note  Patient Name: America Villalta   YOB: 1950   Medical Record Number: [id number]     Attending Physician: Иван Martin M.D. Date of Visit: 8/2/2019     Chief Complaint:  Patient presents with:   Follow Osteoporosis    • Primary biliary cirrhosis (HCC)     primary biliary cirrhosis   • Skin lesion     shoulder, intradermal melanocytic nevus w/congenital features   • Wears glasses        Past Surgical History:  Past Surgical History:   Procedure Laterality 1 or 2        Binge frequency: Never        Comment: Rare--few drinks a year.       Drug use: No      Sexual activity: Never    Lifestyle      Physical activity:        Days per week: Not on file        Minutes per session: Not on file      Stress: Not on f Tab, TAKE ONE AND ONE-HALF TABLETS(750 MG) BY MOUTH DAILY, Disp: 45 tablet, Rfl: 3  •  azathioprine 50 MG Oral Tab, Take 0.5 tablets (25 mg total) by mouth daily. , Disp: 30 tablet, Rfl: 3  •  raNITIdine HCl 300 MG Oral Tab, Take one tablet (300 mg total) b insomnia, depression, pelon or mood swings.            Vital Signs:  /59 (BP Location: Left arm, Patient Position: Sitting, Cuff Size: adult)   Pulse 58   Temp 98.1 °F (36.7 °C) (Tympanic)   Resp 16   Ht 1.575 m (5' 2.01\")   Wt 51.9 kg (114 lb 8 oz) American Latest Ref Range: >=60  56 (L)    GFR, -American Latest Ref Range: >=60  65    ANION GAP Latest Ref Range: 0 - 18 mmol/L 6    CALCULATED OSMOLALITY Latest Ref Range: 275 - 295 mOsm/kg 297 (H)    ALKALINE PHOSPHATASE Latest Ref Range: 55 - 1 26.0 - 34.0 pg 33.9    MCHC Latest Ref Range: 31.0 - 37.0 g/dL 33.9    MCV Latest Ref Range: 80.0 - 100.0 fL 99.7    RDW Latest Ref Range: 11.0 - 15.0 % 12.4    RDW-SD Latest Ref Range: 35.1 - 46.3 fL 45.5    Prelim Neutrophil Abs Latest Ref Range: 1.50 -

## 2019-08-02 NOTE — TELEPHONE ENCOUNTER
Medicare Wellness   Future Appointments   Date Time Provider Maynor Hinson   9/4/2019 10:40 AM Marjan Asif MD [facility]     Orders to Carlos Jimenez  aware must fast no call back required

## 2019-08-06 ENCOUNTER — HOSPITAL ENCOUNTER (OUTPATIENT)
Dept: MRI IMAGING | Facility: HOSPITAL | Age: 69
Discharge: HOME | End: 2019-08-06
Attending: INTERNAL MEDICINE
Payer: MEDICARE

## 2019-08-06 DIAGNOSIS — K74.3 PRIMARY BILIARY CIRRHOSIS (HCC): ICD-10-CM

## 2019-08-06 PROCEDURE — 74183 MRI ABD W/O CNTR FLWD CNTR: CPT | Performed by: INTERNAL MEDICINE

## 2019-08-06 PROCEDURE — A9581 GADOXETATE DISODIUM INJ: HCPCS | Performed by: INTERNAL MEDICINE

## 2019-08-21 ENCOUNTER — OFFICE VISIT (OUTPATIENT)
Dept: SURGERY | Facility: CLINIC | Age: 69
End: 2019-08-21
Payer: MEDICARE

## 2019-08-21 VITALS
BODY MASS INDEX: 21 KG/M2 | HEART RATE: 66 BPM | SYSTOLIC BLOOD PRESSURE: 130 MMHG | RESPIRATION RATE: 18 BRPM | TEMPERATURE: 98 F | DIASTOLIC BLOOD PRESSURE: 71 MMHG | OXYGEN SATURATION: 97 % | WEIGHT: 114 LBS

## 2019-08-21 DIAGNOSIS — K75.4 AUTOIMMUNE HEPATITIS (HCC): Primary | ICD-10-CM

## 2019-08-21 DIAGNOSIS — K74.3 PRIMARY BILIARY CHOLANGITIS (HCC): ICD-10-CM

## 2019-08-21 NOTE — PROGRESS NOTES
[facility]  [address] [facility], [address]  [address]  [phone] ([phone]) swelling, up to mid-calf. ECHO May '19 OK. Is on amlodipine and believes was started Fall '18.        HISTORY:  Past Medical History:   Diagnosis Date   • Abnormal TSH     \"borderline\"   • Arthritis    • Arthritis     hands   • Back pain    • Bad breath Comment: Rare--few drinks a year.     Drug use: No         Current Outpatient Medications:   •  Levothyroxine Sodium 25 MCG Oral Tab, Take 1 tablet (25 mcg total) by mouth before breakfast., Disp: 90 tablet, Rfl: 0  •  AMLODIPINE BESYLATE 5 MG Oral Tab, TAMIKA affect/mood    Assessment and Plan:  70 year old with PBC cirrhosis + likely some AIH overlap given the improvement with immunosuppression.     - There had been concern for possible AIH-PBC overlap over the years but prior biopsies x 2 in the setting of +A

## 2019-08-28 ENCOUNTER — LAB ENCOUNTER (OUTPATIENT)
Dept: LAB | Facility: HOSPITAL | Age: 69
End: 2019-08-28
Attending: INTERNAL MEDICINE
Payer: MEDICARE

## 2019-08-28 DIAGNOSIS — K75.4 AUTOIMMUNE HEPATITIS (HCC): ICD-10-CM

## 2019-08-28 DIAGNOSIS — I25.10 CORONARY ARTERY DISEASE INVOLVING NATIVE CORONARY ARTERY OF NATIVE HEART WITHOUT ANGINA PECTORIS: ICD-10-CM

## 2019-08-28 DIAGNOSIS — Z00.00 ROUTINE GENERAL MEDICAL EXAMINATION AT A HEALTH CARE FACILITY: ICD-10-CM

## 2019-08-28 DIAGNOSIS — K74.3 PRIMARY BILIARY CHOLANGITIS (HCC): ICD-10-CM

## 2019-08-28 LAB
ALBUMIN SERPL-MCNC: 3.5 G/DL (ref 3.4–5)
ALBUMIN/GLOB SERPL: 0.9 {RATIO} (ref 1–2)
ALP LIVER SERPL-CCNC: 92 U/L (ref 55–142)
ALT SERPL-CCNC: 18 U/L (ref 13–56)
ANION GAP SERPL CALC-SCNC: 7 MMOL/L (ref 0–18)
AST SERPL-CCNC: 30 U/L (ref 15–37)
BASOPHILS # BLD AUTO: 0.01 X10(3) UL (ref 0–0.2)
BASOPHILS NFR BLD AUTO: 0.5 %
BILIRUB SERPL-MCNC: 1.7 MG/DL (ref 0.1–2)
BUN BLD-MCNC: 14 MG/DL (ref 7–18)
BUN/CREAT SERPL: 15.7 (ref 10–20)
CALCIUM BLD-MCNC: 9.3 MG/DL (ref 8.5–10.1)
CHLORIDE SERPL-SCNC: 107 MMOL/L (ref 98–112)
CHOLEST SMN-MCNC: 126 MG/DL (ref ?–200)
CO2 SERPL-SCNC: 29 MMOL/L (ref 21–32)
CREAT BLD-MCNC: 0.89 MG/DL (ref 0.55–1.02)
DEPRECATED RDW RBC AUTO: 44.1 FL (ref 35.1–46.3)
EOSINOPHIL # BLD AUTO: 0.09 X10(3) UL (ref 0–0.7)
EOSINOPHIL NFR BLD AUTO: 4.3 %
ERYTHROCYTE [DISTWIDTH] IN BLOOD BY AUTOMATED COUNT: 12 % (ref 11–15)
GLOBULIN PLAS-MCNC: 3.7 G/DL (ref 2.8–4.4)
GLUCOSE BLD-MCNC: 86 MG/DL (ref 70–99)
HCT VFR BLD AUTO: 36.3 % (ref 35–48)
HDLC SERPL-MCNC: 54 MG/DL (ref 40–59)
HGB BLD-MCNC: 12.6 G/DL (ref 12–16)
IMM GRANULOCYTES # BLD AUTO: 0 X10(3) UL (ref 0–1)
IMM GRANULOCYTES NFR BLD: 0 %
IMMUNOGLOBULIN PNL SER-MCNC: 1520 MG/DL (ref 791–1643)
LDLC SERPL CALC-MCNC: 57 MG/DL (ref ?–100)
LYMPHOCYTES # BLD AUTO: 0.38 X10(3) UL (ref 1–4)
LYMPHOCYTES NFR BLD AUTO: 18.3 %
M PROTEIN MFR SERPL ELPH: 7.2 G/DL (ref 6.4–8.2)
MCH RBC QN AUTO: 34.2 PG (ref 26–34)
MCHC RBC AUTO-ENTMCNC: 34.7 G/DL (ref 31–37)
MCV RBC AUTO: 98.6 FL (ref 80–100)
MONOCYTES # BLD AUTO: 0.2 X10(3) UL (ref 0.1–1)
MONOCYTES NFR BLD AUTO: 9.6 %
NEUTROPHILS # BLD AUTO: 1.4 X10 (3) UL (ref 1.5–7.7)
NEUTROPHILS # BLD AUTO: 1.4 X10(3) UL (ref 1.5–7.7)
NEUTROPHILS NFR BLD AUTO: 67.3 %
NONHDLC SERPL-MCNC: 72 MG/DL (ref ?–130)
OSMOLALITY SERPL CALC.SUM OF ELEC: 296 MOSM/KG (ref 275–295)
PLATELET # BLD AUTO: 97 10(3)UL (ref 150–450)
POTASSIUM SERPL-SCNC: 4 MMOL/L (ref 3.5–5.1)
RBC # BLD AUTO: 3.68 X10(6)UL (ref 3.8–5.3)
SODIUM SERPL-SCNC: 143 MMOL/L (ref 136–145)
TRIGL SERPL-MCNC: 77 MG/DL (ref 30–149)
VLDLC SERPL CALC-MCNC: 15 MG/DL (ref 0–30)
WBC # BLD AUTO: 2.1 X10(3) UL (ref 4–11)

## 2019-08-28 PROCEDURE — 36415 COLL VENOUS BLD VENIPUNCTURE: CPT

## 2019-08-28 PROCEDURE — 80299 QUANTITATIVE ASSAY DRUG: CPT

## 2019-08-28 PROCEDURE — 80061 LIPID PANEL: CPT

## 2019-08-28 PROCEDURE — 85025 COMPLETE CBC W/AUTO DIFF WBC: CPT

## 2019-08-28 PROCEDURE — 82784 ASSAY IGA/IGD/IGG/IGM EACH: CPT

## 2019-08-28 PROCEDURE — 80053 COMPREHEN METABOLIC PANEL: CPT

## 2019-09-02 DIAGNOSIS — K75.4 AUTOIMMUNE HEPATITIS (HCC): Primary | ICD-10-CM

## 2019-09-04 ENCOUNTER — OFFICE VISIT (OUTPATIENT)
Dept: INTERNAL MEDICINE CLINIC | Facility: CLINIC | Age: 69
End: 2019-09-04
Payer: MEDICARE

## 2019-09-04 VITALS
SYSTOLIC BLOOD PRESSURE: 140 MMHG | RESPIRATION RATE: 16 BRPM | HEIGHT: 60 IN | WEIGHT: 114 LBS | DIASTOLIC BLOOD PRESSURE: 60 MMHG | BODY MASS INDEX: 22.38 KG/M2 | HEART RATE: 68 BPM

## 2019-09-04 DIAGNOSIS — Z12.31 ENCOUNTER FOR SCREENING MAMMOGRAM FOR MALIGNANT NEOPLASM OF BREAST: ICD-10-CM

## 2019-09-04 DIAGNOSIS — R92.1 BREAST CALCIFICATIONS: ICD-10-CM

## 2019-09-04 DIAGNOSIS — K74.3 PRIMARY BILIARY CIRRHOSIS (HCC): ICD-10-CM

## 2019-09-04 DIAGNOSIS — I10 BENIGN ESSENTIAL HTN: ICD-10-CM

## 2019-09-04 DIAGNOSIS — M81.0 SENILE OSTEOPOROSIS: ICD-10-CM

## 2019-09-04 DIAGNOSIS — Z00.00 ENCOUNTER FOR ANNUAL HEALTH EXAMINATION: Primary | ICD-10-CM

## 2019-09-04 LAB
6-METHYLMERCAPTOPURINE (6-MMP): <500
6-THIOGUANINE (6-GT): 130

## 2019-09-04 PROCEDURE — G0439 PPPS, SUBSEQ VISIT: HCPCS | Performed by: INTERNAL MEDICINE

## 2019-09-04 RX ORDER — LOSARTAN POTASSIUM 50 MG/1
50 TABLET ORAL DAILY
Qty: 30 TABLET | Refills: 1 | Status: SHIPPED | OUTPATIENT
Start: 2019-09-04 | End: 2019-10-02

## 2019-09-04 NOTE — PROGRESS NOTES
HPI:   Myrtha Dubin is a 68year old female who presents for a Medicare Subsequent Annual Wellness visit (Pt already had Initial Annual Wellness).     Patient with PBC  (sees Dr. Gael Key and Dr. Patrick Edgar), HTN, mild CAD (follows with Dr. Antoinette Calzada), senile os list.   CAGE Alcohol screening   Shalini De La Garza was screened for Alcohol abuse and had a score of 0 so is at low risk.     Patient Care Team: Patient Care Team:  Aryan Aragon MD as PCP - General (Internal Medicine)  STAN Coulter as PCP - by mouth 2 (two) times daily. URSODIOL 500 MG Oral Tab TAKE ONE AND ONE-HALF TABLETS(750 MG) BY MOUTH DAILY   azathioprine 50 MG Oral Tab Take 0.5 tablets (25 mg total) by mouth daily.    raNITIdine HCl 300 MG Oral Tab Take one tablet (300 mg total) by mo hemochromatosis in her mother; htn in her father; osteopenia in her mother. SOCIAL HISTORY:   She  reports that she has quit smoking. Her smoking use included cigarettes. She quit after 2.00 years of use.  She has never used smokeless tobacco. She reports Immunization History   Administered Date(s) Administered   • FLUAD High Dose 72 yr and older (02614) 10/11/2018   • Fluzone Vaccine Medicare () 02/16/2018   • HIGH DOSE FLU 65 YRS AND OLDER PRSV FREE SINGLE D (91945) FLU CLINIC 02/16/2018   • Infl describe your current health state?: Good  How do you maintain positive mental well-being?: Social Interaction;Puzzles; Visiting Family      This section provided for quick review of chart, separate sheet to patient  PREVENTATIVE SERVICES  INDICATIONS AND S if applicable)     Influenza  Covered Annually 10/11/2018 Please get every year    Pneumococcal 13 (Prevnar)  Covered Once after 65 03/30/2017 Please get once after your 65th birthday    Pneumococcal 23 (Pneumovax)  Covered Once after 65 08/24/2018 Please

## 2019-09-04 NOTE — PATIENT INSTRUCTIONS
Barbara Phoenix's SCREENING SCHEDULE   Tests on this list are recommended by your physician but may not be covered, or covered at this frequency, by your insurer. Please check with your insurance carrier before scheduling to verify coverage.    AAKASH 73-68 years old and have smoked more than 100 cigarettes in their lifetime   • Anyone with a family history    Colorectal Cancer Screening  Covered up to Age 76     Colonoscopy Screen   Covered every 10 years- more often if abnormal Colonoscopy due on 07/3 FREE    Please get every year    Pneumococcal 13 (Prevnar)  Covered Once after 65 Orders placed or performed in visit on 03/30/17   • PNEUMOCOCCAL VACC, 13 RAI IM    Please get once after your 65th birthday    Pneumococcal 23 (Pneumovax)  Covered Once afte Directives.

## 2019-09-10 ENCOUNTER — HOSPITAL ENCOUNTER (OUTPATIENT)
Dept: MAMMOGRAPHY | Age: 69
Discharge: HOME | End: 2019-09-10
Attending: INTERNAL MEDICINE
Payer: MEDICARE

## 2019-09-10 DIAGNOSIS — R92.1 BREAST CALCIFICATIONS: ICD-10-CM

## 2019-09-10 DIAGNOSIS — Z12.31 ENCOUNTER FOR SCREENING MAMMOGRAM FOR MALIGNANT NEOPLASM OF BREAST: ICD-10-CM

## 2019-09-10 PROCEDURE — 77063 BREAST TOMOSYNTHESIS BI: CPT | Performed by: INTERNAL MEDICINE

## 2019-09-10 PROCEDURE — 77067 SCR MAMMO BI INCL CAD: CPT | Performed by: INTERNAL MEDICINE

## 2019-10-02 ENCOUNTER — LAB ENCOUNTER (OUTPATIENT)
Dept: LAB | Facility: HOSPITAL | Age: 69
End: 2019-10-02
Attending: INTERNAL MEDICINE
Payer: MEDICARE

## 2019-10-02 ENCOUNTER — OFFICE VISIT (OUTPATIENT)
Dept: INTERNAL MEDICINE CLINIC | Facility: CLINIC | Age: 69
End: 2019-10-02
Payer: MEDICARE

## 2019-10-02 VITALS
WEIGHT: 114 LBS | SYSTOLIC BLOOD PRESSURE: 116 MMHG | HEART RATE: 56 BPM | TEMPERATURE: 98 F | BODY MASS INDEX: 22.38 KG/M2 | DIASTOLIC BLOOD PRESSURE: 40 MMHG | HEIGHT: 60 IN

## 2019-10-02 DIAGNOSIS — K75.4 AUTOIMMUNE HEPATITIS (HCC): Primary | ICD-10-CM

## 2019-10-02 DIAGNOSIS — K75.4 AUTOIMMUNE HEPATITIS (HCC): ICD-10-CM

## 2019-10-02 DIAGNOSIS — I10 BENIGN ESSENTIAL HTN: Primary | ICD-10-CM

## 2019-10-02 DIAGNOSIS — R82.90 ABNORMAL URINE ODOR: ICD-10-CM

## 2019-10-02 DIAGNOSIS — Z23 NEEDS FLU SHOT: ICD-10-CM

## 2019-10-02 PROCEDURE — 36415 COLL VENOUS BLD VENIPUNCTURE: CPT

## 2019-10-02 PROCEDURE — 81003 URINALYSIS AUTO W/O SCOPE: CPT | Performed by: INTERNAL MEDICINE

## 2019-10-02 PROCEDURE — 87086 URINE CULTURE/COLONY COUNT: CPT | Performed by: INTERNAL MEDICINE

## 2019-10-02 PROCEDURE — 87186 SC STD MICRODIL/AGAR DIL: CPT | Performed by: INTERNAL MEDICINE

## 2019-10-02 PROCEDURE — 85025 COMPLETE CBC W/AUTO DIFF WBC: CPT

## 2019-10-02 PROCEDURE — 82784 ASSAY IGA/IGD/IGG/IGM EACH: CPT

## 2019-10-02 PROCEDURE — 80053 COMPREHEN METABOLIC PANEL: CPT

## 2019-10-02 PROCEDURE — 87088 URINE BACTERIA CULTURE: CPT | Performed by: INTERNAL MEDICINE

## 2019-10-02 PROCEDURE — G0008 ADMIN INFLUENZA VIRUS VAC: HCPCS | Performed by: INTERNAL MEDICINE

## 2019-10-02 PROCEDURE — 99213 OFFICE O/P EST LOW 20 MIN: CPT | Performed by: INTERNAL MEDICINE

## 2019-10-02 PROCEDURE — 90662 IIV NO PRSV INCREASED AG IM: CPT | Performed by: INTERNAL MEDICINE

## 2019-10-02 RX ORDER — LOSARTAN POTASSIUM 50 MG/1
50 TABLET ORAL DAILY
Qty: 90 TABLET | Refills: 1 | Status: SHIPPED | OUTPATIENT
Start: 2019-10-02

## 2019-10-02 NOTE — PROGRESS NOTES
Deana Contreras is a 68year old female.   Patient presents with:  Blood Pressure: LG. Room 4. b/p follow up   UTI: concern about uti due to odor      HPI:     Patient here for f/u-  HTN- pt tolerating losartan without any side effects, BP always under 120 tablet Rfl: 0   Vitamin B-12 1000 MCG Oral Tab Take 1,000 mcg by mouth daily. Disp:  Rfl:    aspirin 81 MG Oral Tab Take 81 mg by mouth daily. Disp:  Rfl:    Vitamin D3 1000 UNITS Oral Tab Take 5,000 Units by mouth daily.  Disp:  Rfl:    ibuprofen (ADVIL) 2 Allergies    Ciprofloxacin           OTHER (SEE COMMENTS)    Comment:Caused vaginal pain and itching.       REVIEW OF SYSTEMS:   GENERAL HEALTH:  no fevers or chills  SKIN: denies any unusual skin lesions or rashes  RESPIRATORY: no cough  CARDIOVASCULAR

## 2019-10-04 RX ORDER — SULFAMETHOXAZOLE AND TRIMETHOPRIM 800; 160 MG/1; MG/1
1 TABLET ORAL 2 TIMES DAILY
Qty: 14 TABLET | Refills: 0 | Status: SHIPPED | OUTPATIENT
Start: 2019-10-04 | End: 2019-12-12

## 2019-10-22 RX ORDER — PROPRANOLOL HYDROCHLORIDE 20 MG/1
TABLET ORAL
Qty: 180 TABLET | Refills: 0 | Status: SHIPPED | OUTPATIENT
Start: 2019-10-22 | End: 2020-01-31

## 2019-11-18 DIAGNOSIS — E03.8 SUBCLINICAL HYPOTHYROIDISM: ICD-10-CM

## 2019-11-18 DIAGNOSIS — R60.0 BILATERAL LOWER EXTREMITY EDEMA: ICD-10-CM

## 2019-11-18 RX ORDER — LEVOTHYROXINE SODIUM 0.03 MG/1
25 TABLET ORAL
Qty: 90 TABLET | Refills: 2 | Status: SHIPPED | OUTPATIENT
Start: 2019-11-18 | End: 2020-08-25

## 2019-12-12 ENCOUNTER — OFFICE VISIT (OUTPATIENT)
Dept: INTERNAL MEDICINE CLINIC | Facility: CLINIC | Age: 69
End: 2019-12-12
Payer: MEDICARE

## 2019-12-12 VITALS
SYSTOLIC BLOOD PRESSURE: 134 MMHG | HEIGHT: 60 IN | RESPIRATION RATE: 16 BRPM | HEART RATE: 60 BPM | OXYGEN SATURATION: 99 % | TEMPERATURE: 98 F | WEIGHT: 112.38 LBS | BODY MASS INDEX: 22.06 KG/M2 | DIASTOLIC BLOOD PRESSURE: 72 MMHG

## 2019-12-12 DIAGNOSIS — J06.9 URI, ACUTE: Primary | ICD-10-CM

## 2019-12-12 PROCEDURE — 99213 OFFICE O/P EST LOW 20 MIN: CPT | Performed by: INTERNAL MEDICINE

## 2019-12-12 RX ORDER — CEFDINIR 300 MG/1
300 CAPSULE ORAL 2 TIMES DAILY
Qty: 20 CAPSULE | Refills: 0 | Status: SHIPPED | OUTPATIENT
Start: 2019-12-12 | End: 2020-01-21 | Stop reason: ALTCHOICE

## 2019-12-12 NOTE — PROGRESS NOTES
Tamala Apgar is a 68year old female. Patient presents with:  Cough: cn room 3: cough and congestion for about 10 days       HPI:     C/o congestion, cough, hoarse voice, headaches, sore throat for 10 days.  No SOB/CP  Took advil in the evening and see Tab Take 1,000 mcg by mouth daily. • aspirin 81 MG Oral Tab Take 81 mg by mouth daily. • Vitamin D3 1000 UNITS Oral Tab Take 5,000 Units by mouth daily.      • ibuprofen (ADVIL) 200 MG Oral Tab Take 200 mg by mouth every 6 (six) hours as needed for itching.       REVIEW OF SYSTEMS:   GENERAL HEALTH:  no fever  SKIN: denies any unusual skin lesions or rashes  RESPIRATORY: + cough  CARDIOVASCULAR: denies chest pain  GI: denies abdominal pain         EXAM:   /72 (BP Location: Right arm, Patient Pos

## 2020-01-06 PROBLEM — M81.0 AGE-RELATED OSTEOPOROSIS WITHOUT CURRENT PATHOLOGICAL FRACTURE: Status: ACTIVE | Noted: 2020-01-06

## 2020-01-27 ENCOUNTER — LAB ENCOUNTER (OUTPATIENT)
Dept: LAB | Facility: HOSPITAL | Age: 70
End: 2020-01-27
Attending: INTERNAL MEDICINE
Payer: MEDICARE

## 2020-01-27 ENCOUNTER — HOSPITAL ENCOUNTER (OUTPATIENT)
Dept: MRI IMAGING | Facility: HOSPITAL | Age: 70
Discharge: HOME | End: 2020-01-27
Attending: INTERNAL MEDICINE
Payer: MEDICARE

## 2020-01-27 DIAGNOSIS — K74.3 PRIMARY BILIARY CIRRHOSIS (HCC): ICD-10-CM

## 2020-01-27 DIAGNOSIS — K75.4 AUTOIMMUNE HEPATITIS (HCC): ICD-10-CM

## 2020-01-27 DIAGNOSIS — D47.2 MGUS (MONOCLONAL GAMMOPATHY OF UNKNOWN SIGNIFICANCE): ICD-10-CM

## 2020-01-27 DIAGNOSIS — K74.3 PRIMARY BILIARY CHOLANGITIS (HCC): ICD-10-CM

## 2020-01-27 LAB
AFP-TM SERPL-MCNC: 6.7 NG/ML (ref ?–8)
ALBUMIN SERPL-MCNC: 3.4 G/DL (ref 3.4–5)
ALBUMIN/GLOB SERPL: 0.8 {RATIO} (ref 1–2)
ALP LIVER SERPL-CCNC: 108 U/L (ref 55–142)
ALT SERPL-CCNC: 18 U/L (ref 13–56)
ANION GAP SERPL CALC-SCNC: 4 MMOL/L (ref 0–18)
AST SERPL-CCNC: 28 U/L (ref 15–37)
BASOPHILS # BLD AUTO: 0.03 X10(3) UL (ref 0–0.2)
BASOPHILS NFR BLD AUTO: 1 %
BILIRUB SERPL-MCNC: 1.4 MG/DL (ref 0.1–2)
BUN BLD-MCNC: 17 MG/DL (ref 7–18)
BUN/CREAT SERPL: 15.9 (ref 10–20)
CALCIUM BLD-MCNC: 10 MG/DL (ref 8.5–10.1)
CHLORIDE SERPL-SCNC: 106 MMOL/L (ref 98–112)
CO2 SERPL-SCNC: 28 MMOL/L (ref 21–32)
CREAT BLD-MCNC: 1.07 MG/DL (ref 0.55–1.02)
DEPRECATED RDW RBC AUTO: 47.4 FL (ref 35.1–46.3)
EOSINOPHIL # BLD AUTO: 0.22 X10(3) UL (ref 0–0.7)
EOSINOPHIL NFR BLD AUTO: 7.5 %
ERYTHROCYTE [DISTWIDTH] IN BLOOD BY AUTOMATED COUNT: 12.6 % (ref 11–15)
GLOBULIN PLAS-MCNC: 4.4 G/DL (ref 2.8–4.4)
GLUCOSE BLD-MCNC: 87 MG/DL (ref 70–99)
HCT VFR BLD AUTO: 37.8 % (ref 35–48)
HGB BLD-MCNC: 12.9 G/DL (ref 12–16)
IGA SERPL-MCNC: 402 MG/DL (ref 70–312)
IGM SERPL-MCNC: 223 MG/DL (ref 43–279)
IMM GRANULOCYTES # BLD AUTO: 0.01 X10(3) UL (ref 0–1)
IMM GRANULOCYTES NFR BLD: 0.3 %
IMMUNOGLOBULIN PNL SER-MCNC: 1510 MG/DL (ref 791–1643)
INR BLD: 1.13 (ref 0.9–1.1)
LDH SERPL L TO P-CCNC: 231 U/L (ref 84–246)
LYMPHOCYTES # BLD AUTO: 0.55 X10(3) UL (ref 1–4)
LYMPHOCYTES NFR BLD AUTO: 18.8 %
M PROTEIN MFR SERPL ELPH: 7.8 G/DL (ref 6.4–8.2)
MCH RBC QN AUTO: 34.6 PG (ref 26–34)
MCHC RBC AUTO-ENTMCNC: 34.1 G/DL (ref 31–37)
MCV RBC AUTO: 101.3 FL (ref 80–100)
MONOCYTES # BLD AUTO: 0.37 X10(3) UL (ref 0.1–1)
MONOCYTES NFR BLD AUTO: 12.7 %
NEUTROPHILS # BLD AUTO: 1.74 X10 (3) UL (ref 1.5–7.7)
NEUTROPHILS # BLD AUTO: 1.74 X10(3) UL (ref 1.5–7.7)
NEUTROPHILS NFR BLD AUTO: 59.7 %
OSMOLALITY SERPL CALC.SUM OF ELEC: 287 MOSM/KG (ref 275–295)
PATIENT FASTING Y/N/NP: YES
PLATELET # BLD AUTO: 122 10(3)UL (ref 150–450)
POTASSIUM SERPL-SCNC: 4.2 MMOL/L (ref 3.5–5.1)
PSA SERPL DL<=0.01 NG/ML-MCNC: 15 SECONDS (ref 12.5–14.7)
RBC # BLD AUTO: 3.73 X10(6)UL (ref 3.8–5.3)
SODIUM SERPL-SCNC: 138 MMOL/L (ref 136–145)
WBC # BLD AUTO: 2.9 X10(3) UL (ref 4–11)

## 2020-01-27 PROCEDURE — 86334 IMMUNOFIX E-PHORESIS SERUM: CPT

## 2020-01-27 PROCEDURE — 82784 ASSAY IGA/IGD/IGG/IGM EACH: CPT

## 2020-01-27 PROCEDURE — 83883 ASSAY NEPHELOMETRY NOT SPEC: CPT

## 2020-01-27 PROCEDURE — 84165 PROTEIN E-PHORESIS SERUM: CPT

## 2020-01-27 PROCEDURE — 36415 COLL VENOUS BLD VENIPUNCTURE: CPT

## 2020-01-27 PROCEDURE — 85025 COMPLETE CBC W/AUTO DIFF WBC: CPT

## 2020-01-27 PROCEDURE — 83615 LACTATE (LD) (LDH) ENZYME: CPT

## 2020-01-27 PROCEDURE — A9581 GADOXETATE DISODIUM INJ: HCPCS | Performed by: INTERNAL MEDICINE

## 2020-01-27 PROCEDURE — 82105 ALPHA-FETOPROTEIN SERUM: CPT

## 2020-01-27 PROCEDURE — 74183 MRI ABD W/O CNTR FLWD CNTR: CPT | Performed by: INTERNAL MEDICINE

## 2020-01-27 PROCEDURE — 80053 COMPREHEN METABOLIC PANEL: CPT

## 2020-01-27 PROCEDURE — 85610 PROTHROMBIN TIME: CPT

## 2020-01-28 DIAGNOSIS — K75.4 AUTOIMMUNE HEPATITIS (HCC): ICD-10-CM

## 2020-01-28 RX ORDER — AZATHIOPRINE 50 MG/1
TABLET ORAL
Qty: 30 TABLET | Refills: 3 | Status: SHIPPED | OUTPATIENT
Start: 2020-01-28 | End: 2021-08-30

## 2020-01-29 LAB
KAPPA FREE LIGHT CHAIN: 5.91 MG/DL (ref 0.33–1.94)
KAPPA/LAMBDA FLC RATIO: 1.33 (ref 0.26–1.65)
LAMBDA FREE LIGHT CHAIN: 4.43 MG/DL (ref 0.57–2.63)

## 2020-01-31 LAB
ALBUMIN SERPL ELPH-MCNC: 3.96 G/DL (ref 3.75–5.21)
ALBUMIN/GLOB SERPL: 1.22 {RATIO} (ref 1–2)
ALPHA1 GLOB SERPL ELPH-MCNC: 0.25 G/DL (ref 0.19–0.46)
ALPHA2 GLOB SERPL ELPH-MCNC: 0.55 G/DL (ref 0.48–1.05)
B-GLOBULIN SERPL ELPH-MCNC: 0.87 G/DL (ref 0.68–1.23)
GAMMA GLOB SERPL ELPH-MCNC: 1.57 G/DL (ref 0.62–1.7)
M PROTEIN MFR SERPL ELPH: 7.2 G/DL (ref 6.4–8.2)

## 2020-01-31 RX ORDER — PROPRANOLOL HYDROCHLORIDE 20 MG/1
TABLET ORAL
Qty: 180 TABLET | Refills: 0 | Status: SHIPPED | OUTPATIENT
Start: 2020-01-31 | End: 2020-05-04

## 2020-02-03 ENCOUNTER — OFFICE VISIT (OUTPATIENT)
Dept: HEMATOLOGY/ONCOLOGY | Facility: HOSPITAL | Age: 70
End: 2020-02-03
Attending: INTERNAL MEDICINE
Payer: MEDICARE

## 2020-02-03 VITALS
TEMPERATURE: 97 F | RESPIRATION RATE: 16 BRPM | HEIGHT: 60 IN | DIASTOLIC BLOOD PRESSURE: 62 MMHG | OXYGEN SATURATION: 98 % | HEART RATE: 72 BPM | BODY MASS INDEX: 22.87 KG/M2 | WEIGHT: 116.5 LBS | SYSTOLIC BLOOD PRESSURE: 124 MMHG

## 2020-02-03 DIAGNOSIS — D47.2 MGUS (MONOCLONAL GAMMOPATHY OF UNKNOWN SIGNIFICANCE): Primary | ICD-10-CM

## 2020-02-03 DIAGNOSIS — D69.6 THROMBOCYTOPENIA (HCC): ICD-10-CM

## 2020-02-03 DIAGNOSIS — D72.819 LEUKOPENIA, UNSPECIFIED TYPE: ICD-10-CM

## 2020-02-03 PROCEDURE — 99213 OFFICE O/P EST LOW 20 MIN: CPT | Performed by: INTERNAL MEDICINE

## 2020-02-03 NOTE — PROGRESS NOTES
Cancer Center Progress Note  Patient Name: Mary Aleman   YOB: 1950   Medical Record Number: [id number]     Attending Physician: Celestino Skinner M.D. Date of Visit: 2/3/2020     Chief Complaint:  No chief complaint on file. History:   Procedure Laterality Date   • ANGIOGRAM     • COLONOSCOPY  02/16/2010   • COLONOSCOPY     • COLONOSCOPY N/A 7/31/2018    Performed by Perry Madera MD at [facility] ENDOSCOPY   • EGD     • ESOPHAGOGASTRODUODENOSCOPY (EGD) N/A 7/31/2018    Performed Not on file      Stress: Not on file    Relationships      Social connections:        Talks on phone: Not on file        Gets together: Not on file        Attends Jew service: Not on file        Active member of club or organization: Not on file total) by mouth every 4 (four) hours as needed for Cramping. (Patient not taking: Reported on 1/21/2020 ), Disp: 20 tablet, Rfl: 0  •  Vitamin B-12 1000 MCG Oral Tab, Take 1,000 mcg by mouth daily. , Disp: , Rfl:   •  aspirin 81 MG Oral Tab, Take 81 mg by m icterus. Pupils are reactive and equal.   Hematologic/Lymphatic Normal - No petechiae or purpura. No tender or palpable lymph nodes in the cervical, supraclavicular, axillary or inguinal area.    Respiratory Normal - Lungs are clear to auscultation, no whe - 2. 00   1.22   SPE INTERPRETATION Unknown  Monoclonal spike detected by immunofixation electrophoresis. .. IMMUNOFIXATION Unknown  Monoclonal IgM lambda. If clinically indicated, 24 hour urine monoclonal...    Results for Shahriar Camacho (MRN [id number])

## 2020-02-03 NOTE — PROGRESS NOTES
6 month MD f/u for leukopenia and thrombocytopenia. Labs drawn last week. Reports that she is feeling well.      Education Record    Learner:  Patient    Barriers / Limitations:  None   Comments:    Method:  Discussion   Comments:    General Topics:  Plan o

## 2020-02-05 ENCOUNTER — APPOINTMENT (OUTPATIENT)
Dept: LAB | Facility: HOSPITAL | Age: 70
End: 2020-02-05
Attending: INTERNAL MEDICINE
Payer: MEDICARE

## 2020-02-06 DIAGNOSIS — K75.4 AUTOIMMUNE HEPATITIS (HCC): Primary | ICD-10-CM

## 2020-02-12 ENCOUNTER — APPOINTMENT (OUTPATIENT)
Dept: LAB | Facility: HOSPITAL | Age: 70
End: 2020-02-12
Attending: INTERNAL MEDICINE
Payer: MEDICARE

## 2020-02-12 DIAGNOSIS — M81.0 AGE-RELATED OSTEOPOROSIS WITHOUT CURRENT PATHOLOGICAL FRACTURE: ICD-10-CM

## 2020-02-12 LAB
ALBUMIN SERPL-MCNC: 3.3 G/DL (ref 3.4–5)
ALBUMIN/GLOB SERPL: 0.9 {RATIO} (ref 1–2)
ALP LIVER SERPL-CCNC: 89 U/L (ref 55–142)
ALT SERPL-CCNC: 16 U/L (ref 13–56)
ANION GAP SERPL CALC-SCNC: <0 MMOL/L (ref 0–18)
AST SERPL-CCNC: 30 U/L (ref 15–37)
BILIRUB SERPL-MCNC: 1.1 MG/DL (ref 0.1–2)
BUN BLD-MCNC: 18 MG/DL (ref 7–18)
BUN/CREAT SERPL: 19.8 (ref 10–20)
CALCIUM BLD-MCNC: 9 MG/DL (ref 8.5–10.1)
CHLORIDE SERPL-SCNC: 110 MMOL/L (ref 98–112)
CO2 SERPL-SCNC: 31 MMOL/L (ref 21–32)
CREAT BLD-MCNC: 0.91 MG/DL (ref 0.55–1.02)
GLOBULIN PLAS-MCNC: 3.8 G/DL (ref 2.8–4.4)
GLUCOSE BLD-MCNC: 86 MG/DL (ref 70–99)
M PROTEIN MFR SERPL ELPH: 7.1 G/DL (ref 6.4–8.2)
OSMOLALITY SERPL CALC.SUM OF ELEC: 289 MOSM/KG (ref 275–295)
PATIENT FASTING Y/N/NP: NO
POTASSIUM SERPL-SCNC: 4.3 MMOL/L (ref 3.5–5.1)
SODIUM SERPL-SCNC: 139 MMOL/L (ref 136–145)

## 2020-02-12 PROCEDURE — 36415 COLL VENOUS BLD VENIPUNCTURE: CPT

## 2020-02-12 PROCEDURE — 80053 COMPREHEN METABOLIC PANEL: CPT

## 2020-02-14 ENCOUNTER — APPOINTMENT (OUTPATIENT)
Dept: LAB | Facility: HOSPITAL | Age: 70
End: 2020-02-14
Attending: INTERNAL MEDICINE
Payer: MEDICARE

## 2020-02-14 DIAGNOSIS — M81.0 AGE-RELATED OSTEOPOROSIS WITHOUT CURRENT PATHOLOGICAL FRACTURE: ICD-10-CM

## 2020-02-14 LAB
ANION GAP SERPL CALC-SCNC: 0 MMOL/L (ref 0–18)
BUN BLD-MCNC: 15 MG/DL (ref 7–18)
BUN/CREAT SERPL: 14.7 (ref 10–20)
CALCIUM BLD-MCNC: 9.6 MG/DL (ref 8.5–10.1)
CHLORIDE SERPL-SCNC: 108 MMOL/L (ref 98–112)
CO2 SERPL-SCNC: 31 MMOL/L (ref 21–32)
CREAT BLD-MCNC: 1.02 MG/DL (ref 0.55–1.02)
GLUCOSE BLD-MCNC: 81 MG/DL (ref 70–99)
OSMOLALITY SERPL CALC.SUM OF ELEC: 288 MOSM/KG (ref 275–295)
PATIENT FASTING Y/N/NP: NO
POTASSIUM SERPL-SCNC: 4.4 MMOL/L (ref 3.5–5.1)
SODIUM SERPL-SCNC: 139 MMOL/L (ref 136–145)

## 2020-02-14 PROCEDURE — 36415 COLL VENOUS BLD VENIPUNCTURE: CPT

## 2020-02-14 PROCEDURE — 80048 BASIC METABOLIC PNL TOTAL CA: CPT

## 2020-04-03 ENCOUNTER — LABORATORY ENCOUNTER (OUTPATIENT)
Dept: LAB | Age: 70
End: 2020-04-03
Attending: INTERNAL MEDICINE
Payer: MEDICARE

## 2020-04-03 DIAGNOSIS — K75.4 AUTOIMMUNE HEPATITIS (HCC): ICD-10-CM

## 2020-04-03 PROCEDURE — 85025 COMPLETE CBC W/AUTO DIFF WBC: CPT

## 2020-04-03 PROCEDURE — 80053 COMPREHEN METABOLIC PANEL: CPT

## 2020-04-03 PROCEDURE — 82784 ASSAY IGA/IGD/IGG/IGM EACH: CPT

## 2020-04-03 PROCEDURE — 36415 COLL VENOUS BLD VENIPUNCTURE: CPT

## 2020-04-11 ENCOUNTER — TELEPHONE (OUTPATIENT)
Dept: SURGERY | Facility: CLINIC | Age: 70
End: 2020-04-11

## 2020-04-11 DIAGNOSIS — K75.4 AUTOIMMUNE HEPATITIS (HCC): ICD-10-CM

## 2020-04-11 DIAGNOSIS — K74.3 HEPATIC CIRRHOSIS DUE TO PRIMARY BILIARY CHOLANGITIS (HCC): Primary | ICD-10-CM

## 2020-04-11 NOTE — TELEPHONE ENCOUNTER
Spoke with patient about labs. LFT remain good. WBC/ANC fluctuates and always low but a touch lower than usual.     Asked her to reduce Azathioprine to 25 mg every other day and we'll repeat labs in ~ 3 months.  If LFT start to trend up a touch then will li

## 2020-04-24 ENCOUNTER — VIRTUAL PHONE E/M (OUTPATIENT)
Dept: INTERNAL MEDICINE CLINIC | Facility: CLINIC | Age: 70
End: 2020-04-24
Payer: MEDICARE

## 2020-04-24 DIAGNOSIS — J34.89 SORE IN NOSE: Primary | ICD-10-CM

## 2020-04-24 PROCEDURE — 99441 PHONE E/M BY PHYS 5-10 MIN: CPT | Performed by: INTERNAL MEDICINE

## 2020-04-24 NOTE — PROGRESS NOTES
Due to COVID-19 ACTION PLAN, the patient's office visit was converted to a phone visit. Patient understands and accepts financial responsibility for any deductible, co-insurance and/or co-pays associated with this service.   Time Spent:7 min    Subjective

## 2020-05-04 RX ORDER — PROPRANOLOL HYDROCHLORIDE 20 MG/1
TABLET ORAL
Qty: 180 TABLET | Refills: 0 | Status: SHIPPED | OUTPATIENT
Start: 2020-05-04 | End: 2020-08-10

## 2020-05-04 NOTE — TELEPHONE ENCOUNTER
Last OV 12.12.19 w/ TB (acute)  Last PE 9.4.19  Last REFILL 1.31.20 Propranolol 20mg #180 0R   Last LABS 8.28.19 Lipid     Future Appointments   Date Time Provider Maynor Hinson   5/11/2020  9:00 AM MD JOSÉ Nguyen   5/18/2020  1:

## 2020-06-26 ENCOUNTER — LAB ENCOUNTER (OUTPATIENT)
Dept: LAB | Facility: HOSPITAL | Age: 70
End: 2020-06-26
Attending: INTERNAL MEDICINE
Payer: MEDICARE

## 2020-06-26 DIAGNOSIS — Z01.818 OTHER SPECIFIED PRE-OPERATIVE EXAMINATION: ICD-10-CM

## 2020-06-26 DIAGNOSIS — Z11.59 ENCOUNTER FOR SCREENING FOR OTHER VIRAL DISEASES: ICD-10-CM

## 2020-06-29 ENCOUNTER — HOSPITAL ENCOUNTER (OUTPATIENT)
Dept: CV DIAGNOSTICS | Facility: HOSPITAL | Age: 70
Discharge: HOME | End: 2020-06-29
Attending: INTERNAL MEDICINE
Payer: MEDICARE

## 2020-06-29 DIAGNOSIS — I25.10 CORONARY ARTERY DISEASE INVOLVING NATIVE CORONARY ARTERY OF NATIVE HEART WITHOUT ANGINA PECTORIS: ICD-10-CM

## 2020-06-29 PROCEDURE — 93018 CV STRESS TEST I&R ONLY: CPT | Performed by: INTERNAL MEDICINE

## 2020-06-29 PROCEDURE — 93017 CV STRESS TEST TRACING ONLY: CPT | Performed by: INTERNAL MEDICINE

## 2020-07-02 NOTE — PROGRESS NOTES
Ms. Dilip Hayes,   Your stress test shows no evidence of coronary blockage. However, you did not get your heart rate up high enough for the stress test to be fully diagnostic.  If you have any symptoms such as chest pain or pressure, or undue shortness of breath,

## 2020-07-22 ENCOUNTER — TELEPHONE (OUTPATIENT)
Dept: INTERNAL MEDICINE CLINIC | Facility: CLINIC | Age: 70
End: 2020-07-22

## 2020-07-22 DIAGNOSIS — I10 BENIGN ESSENTIAL HTN: ICD-10-CM

## 2020-07-22 DIAGNOSIS — Z13.0 SCREENING FOR BLOOD DISEASE: ICD-10-CM

## 2020-07-22 DIAGNOSIS — I10 ESSENTIAL HYPERTENSION: ICD-10-CM

## 2020-07-22 DIAGNOSIS — Z13.29 THYROID DISORDER SCREENING: ICD-10-CM

## 2020-07-22 DIAGNOSIS — Z00.00 ENCOUNTER FOR ANNUAL HEALTH EXAMINATION: Primary | ICD-10-CM

## 2020-07-22 NOTE — TELEPHONE ENCOUNTER
Future Appointments   Date Time Provider Maynor Joya   9/9/2020  9:00 AM Radha Stafford MD [facility]     Annual Physical   Lab is [address]  Pt aware to fast and to complete labs no sooner than 2 weeks prior to physical   No call back requir

## 2020-07-27 ENCOUNTER — TELEPHONE (OUTPATIENT)
Dept: HEMATOLOGY/ONCOLOGY | Facility: HOSPITAL | Age: 70
End: 2020-07-27

## 2020-07-27 ENCOUNTER — HOSPITAL ENCOUNTER (OUTPATIENT)
Dept: MRI IMAGING | Facility: HOSPITAL | Age: 70
Discharge: HOME | End: 2020-07-27
Attending: INTERNAL MEDICINE
Payer: MEDICARE

## 2020-07-27 ENCOUNTER — LAB ENCOUNTER (OUTPATIENT)
Dept: LAB | Facility: HOSPITAL | Age: 70
End: 2020-07-27
Attending: INTERNAL MEDICINE
Payer: MEDICARE

## 2020-07-27 DIAGNOSIS — K74.3 PRIMARY BILIARY CIRRHOSIS (HCC): ICD-10-CM

## 2020-07-27 DIAGNOSIS — K74.3 HEPATIC CIRRHOSIS DUE TO PRIMARY BILIARY CHOLANGITIS (HCC): ICD-10-CM

## 2020-07-27 DIAGNOSIS — K86.2 PANCREATIC CYST: ICD-10-CM

## 2020-07-27 DIAGNOSIS — K74.60 CIRRHOSIS OF LIVER WITHOUT ASCITES, UNSPECIFIED HEPATIC CIRRHOSIS TYPE (HCC): ICD-10-CM

## 2020-07-27 DIAGNOSIS — K75.4 AUTOIMMUNE HEPATITIS (HCC): ICD-10-CM

## 2020-07-27 DIAGNOSIS — D47.2 MGUS (MONOCLONAL GAMMOPATHY OF UNKNOWN SIGNIFICANCE): Primary | ICD-10-CM

## 2020-07-27 DIAGNOSIS — D47.2 MGUS (MONOCLONAL GAMMOPATHY OF UNKNOWN SIGNIFICANCE): ICD-10-CM

## 2020-07-27 DIAGNOSIS — K74.69 OTHER CIRRHOSIS OF LIVER (HCC): ICD-10-CM

## 2020-07-27 LAB
AFP-TM SERPL-MCNC: 7.4 NG/ML (ref ?–8)
ALBUMIN SERPL-MCNC: 3.7 G/DL (ref 3.4–5)
ALBUMIN/GLOB SERPL: 0.9 {RATIO} (ref 1–2)
ALP LIVER SERPL-CCNC: 82 U/L (ref 55–142)
ALT SERPL-CCNC: 29 U/L (ref 13–56)
ANION GAP SERPL CALC-SCNC: 2 MMOL/L (ref 0–18)
AST SERPL-CCNC: 46 U/L (ref 15–37)
BASOPHILS # BLD AUTO: 0.01 X10(3) UL (ref 0–0.2)
BASOPHILS NFR BLD AUTO: 0.5 %
BILIRUB SERPL-MCNC: 2.1 MG/DL (ref 0.1–2)
BUN BLD-MCNC: 15 MG/DL (ref 7–18)
BUN/CREAT SERPL: 15 (ref 10–20)
CALCIUM BLD-MCNC: 9.6 MG/DL (ref 8.5–10.1)
CHLORIDE SERPL-SCNC: 107 MMOL/L (ref 98–112)
CO2 SERPL-SCNC: 29 MMOL/L (ref 21–32)
CREAT BLD-MCNC: 1 MG/DL (ref 0.55–1.02)
DEPRECATED RDW RBC AUTO: 43 FL (ref 35.1–46.3)
EOSINOPHIL # BLD AUTO: 0.18 X10(3) UL (ref 0–0.7)
EOSINOPHIL NFR BLD AUTO: 8.5 %
ERYTHROCYTE [DISTWIDTH] IN BLOOD BY AUTOMATED COUNT: 11.8 % (ref 11–15)
GLOBULIN PLAS-MCNC: 4.1 G/DL (ref 2.8–4.4)
GLUCOSE BLD-MCNC: 94 MG/DL (ref 70–99)
HCT VFR BLD AUTO: 38.5 % (ref 35–48)
HGB BLD-MCNC: 13 G/DL (ref 12–16)
IGA SERPL-MCNC: 408 MG/DL (ref 70–312)
IGM SERPL-MCNC: 222 MG/DL (ref 43–279)
IMM GRANULOCYTES # BLD AUTO: 0 X10(3) UL (ref 0–1)
IMM GRANULOCYTES NFR BLD: 0 %
IMMUNOGLOBULIN PNL SER-MCNC: 1560 MG/DL (ref 791–1643)
INR BLD: 1.16 (ref 0.89–1.11)
LYMPHOCYTES # BLD AUTO: 0.49 X10(3) UL (ref 1–4)
LYMPHOCYTES NFR BLD AUTO: 23 %
M PROTEIN MFR SERPL ELPH: 7.8 G/DL (ref 6.4–8.2)
MCH RBC QN AUTO: 33.6 PG (ref 26–34)
MCHC RBC AUTO-ENTMCNC: 33.8 G/DL (ref 31–37)
MCV RBC AUTO: 99.5 FL (ref 80–100)
MONOCYTES # BLD AUTO: 0.23 X10(3) UL (ref 0.1–1)
MONOCYTES NFR BLD AUTO: 10.8 %
NEUTROPHILS # BLD AUTO: 1.22 X10 (3) UL (ref 1.5–7.7)
NEUTROPHILS # BLD AUTO: 1.22 X10(3) UL (ref 1.5–7.7)
NEUTROPHILS NFR BLD AUTO: 57.2 %
OSMOLALITY SERPL CALC.SUM OF ELEC: 287 MOSM/KG (ref 275–295)
PATIENT FASTING Y/N/NP: YES
PLATELET # BLD AUTO: 104 10(3)UL (ref 150–450)
POTASSIUM SERPL-SCNC: 3.9 MMOL/L (ref 3.5–5.1)
PSA SERPL DL<=0.01 NG/ML-MCNC: 15.2 SECONDS (ref 12.4–14.6)
RBC # BLD AUTO: 3.87 X10(6)UL (ref 3.8–5.3)
SODIUM SERPL-SCNC: 138 MMOL/L (ref 136–145)
WBC # BLD AUTO: 2.1 X10(3) UL (ref 4–11)

## 2020-07-27 PROCEDURE — 86334 IMMUNOFIX E-PHORESIS SERUM: CPT

## 2020-07-27 PROCEDURE — 82784 ASSAY IGA/IGD/IGG/IGM EACH: CPT

## 2020-07-27 PROCEDURE — 84165 PROTEIN E-PHORESIS SERUM: CPT

## 2020-07-27 PROCEDURE — 82105 ALPHA-FETOPROTEIN SERUM: CPT

## 2020-07-27 PROCEDURE — 83883 ASSAY NEPHELOMETRY NOT SPEC: CPT

## 2020-07-27 PROCEDURE — A9581 GADOXETATE DISODIUM INJ: HCPCS | Performed by: INTERNAL MEDICINE

## 2020-07-27 PROCEDURE — 74183 MRI ABD W/O CNTR FLWD CNTR: CPT | Performed by: INTERNAL MEDICINE

## 2020-07-27 PROCEDURE — 85610 PROTHROMBIN TIME: CPT

## 2020-07-27 PROCEDURE — 36415 COLL VENOUS BLD VENIPUNCTURE: CPT

## 2020-07-27 PROCEDURE — 85025 COMPLETE CBC W/AUTO DIFF WBC: CPT

## 2020-07-27 PROCEDURE — 80053 COMPREHEN METABOLIC PANEL: CPT

## 2020-07-27 NOTE — TELEPHONE ENCOUNTER
Patient at lab and orders from Dr Malcolm Kilgore not available, requesting lab orders be sent asap as she is at lab in [place] now.

## 2020-07-28 LAB
KAPPA FREE LIGHT CHAIN: 4.63 MG/DL (ref 0.33–1.94)
KAPPA/LAMBDA FLC RATIO: 1.28 (ref 0.26–1.65)
LAMBDA FREE LIGHT CHAIN: 3.62 MG/DL (ref 0.57–2.63)

## 2020-07-29 LAB
ALBUMIN SERPL ELPH-MCNC: 4.4 G/DL (ref 3.75–5.21)
ALBUMIN/GLOB SERPL: 1.26 {RATIO} (ref 1–2)
ALPHA1 GLOB SERPL ELPH-MCNC: 0.24 G/DL (ref 0.19–0.46)
ALPHA2 GLOB SERPL ELPH-MCNC: 0.52 G/DL (ref 0.48–1.05)
B-GLOBULIN SERPL ELPH-MCNC: 0.91 G/DL (ref 0.68–1.23)
GAMMA GLOB SERPL ELPH-MCNC: 1.83 G/DL (ref 0.62–1.7)
M PROTEIN MFR SERPL ELPH: 7.9 G/DL (ref 6.4–8.2)

## 2020-08-03 ENCOUNTER — APPOINTMENT (OUTPATIENT)
Dept: HEMATOLOGY/ONCOLOGY | Facility: HOSPITAL | Age: 70
End: 2020-08-03
Attending: INTERNAL MEDICINE
Payer: MEDICARE

## 2020-08-03 ENCOUNTER — TELEPHONE (OUTPATIENT)
Dept: HEMATOLOGY/ONCOLOGY | Facility: HOSPITAL | Age: 70
End: 2020-08-03

## 2020-08-07 ENCOUNTER — OFFICE VISIT (OUTPATIENT)
Dept: HEMATOLOGY/ONCOLOGY | Facility: HOSPITAL | Age: 70
End: 2020-08-07
Attending: INTERNAL MEDICINE
Payer: MEDICARE

## 2020-08-07 VITALS
DIASTOLIC BLOOD PRESSURE: 69 MMHG | SYSTOLIC BLOOD PRESSURE: 147 MMHG | HEIGHT: 60 IN | BODY MASS INDEX: 23.95 KG/M2 | OXYGEN SATURATION: 98 % | WEIGHT: 122 LBS | RESPIRATION RATE: 16 BRPM | HEART RATE: 62 BPM | TEMPERATURE: 97 F

## 2020-08-07 DIAGNOSIS — D69.6 THROMBOCYTOPENIA (HCC): ICD-10-CM

## 2020-08-07 DIAGNOSIS — D72.819 LEUKOPENIA, UNSPECIFIED TYPE: Primary | ICD-10-CM

## 2020-08-07 DIAGNOSIS — D47.2 MGUS (MONOCLONAL GAMMOPATHY OF UNKNOWN SIGNIFICANCE): ICD-10-CM

## 2020-08-07 PROCEDURE — 99214 OFFICE O/P EST MOD 30 MIN: CPT | Performed by: INTERNAL MEDICINE

## 2020-08-07 NOTE — PROGRESS NOTES
Cancer Center Progress Note  Patient Name: Carlos Dowell   YOB: 1950   Medical Record Number: [id number]     Attending Physician: Marycarmen Zavala M.D. Date of Visit: 8/7/2020       Chief Complaint:  Patient presents with:   Sanket Jaramillo shoulder, intradermal melanocytic nevus w/congenital features   • Wears glasses    • Weight gain     Sudden onset of rapid weight gain (9-10 lbs.)       Past Surgical History:  Past Surgical History:   Procedure Laterality Date   • ANGIOGRAM     • [address] [place] [address] Loop Activity      Alcohol use: Not Currently        Frequency: Monthly or less        Drinks per session: 1 or 2        Binge frequency: Never        Comment: Rare--1-2 drinks a year.       Drug use: No      Sexual activity: Never    Lifestyle      Physical act taking differently: 1/2 tablet every other day per Dr. Apolonia Avitia ), Disp: 30 tablet, Rfl: 3  •  Levothyroxine Sodium 25 MCG Oral Tab, Take 1 tablet (25 mcg total) by mouth before breakfast., Disp: 90 tablet, Rfl: 2  •  losartan Potassium 50 MG Oral Tab, Take 1 Signs:   /69 (BP Location: Left arm, Patient Position: Sitting, Cuff Size: adult)   Pulse 62   Temp 97.2 °F (36.2 °C) (Temporal)   Resp 16   Ht 1.524 m (5')   Wt 55.3 kg (122 lb)   LMP  (LMP Unknown)   SpO2 98%   BMI 23.83 kg/m²     Physical Examinati 295 mOsm/kg 287    ALKALINE PHOSPHATASE Latest Ref Range: 55 - 142 U/L 82    AST (SGOT) Latest Ref Range: 15 - 37 U/L 46 (H)    ALT (SGPT) Latest Ref Range: 13 - 56 U/L 29    Total Bilirubin Latest Ref Range: 0.1 - 2.0 mg/dL 2.1 (H)    Globulin Latest Ref 11.0 - 15.0 % 11.8    RDW-SD Latest Ref Range: 35.1 - 46.3 fL 43.0    Prelim Neutrophil Abs Latest Ref Range: 1.50 - 7.70 x10 (3) uL 1.22 (L)    Neutrophils Absolute Latest Ref Range: 1.50 - 7.70 x10(3) uL 1.22 (L)    Lymphocytes Absolute Latest Ref Range:

## 2020-08-09 DIAGNOSIS — K75.4 AUTOIMMUNE HEPATITIS (HCC): Primary | ICD-10-CM

## 2020-08-10 RX ORDER — PROPRANOLOL HYDROCHLORIDE 20 MG/1
TABLET ORAL
Qty: 180 TABLET | Refills: 0 | Status: SHIPPED | OUTPATIENT
Start: 2020-08-10 | End: 2020-11-17

## 2020-08-10 NOTE — TELEPHONE ENCOUNTER
Last VISIT 12/12/19 TB URI, 4/24/20 Virtual Phone E/M TB Spre in nose    Last REFILL 5/4/20 Propranolol 180T 0R    Last LABS 7/27/20 CMP, CBC, PT, AFP, Monoclonal, IGG    Future Appointments   Date Time Provider Maynor Hinson   8/17/2020  9:15 AM Dyana Fowler

## 2020-08-17 ENCOUNTER — VIRTUAL PHONE E/M (OUTPATIENT)
Dept: SURGERY | Facility: CLINIC | Age: 70
End: 2020-08-17
Payer: MEDICARE

## 2020-08-17 DIAGNOSIS — K74.3 PRIMARY BILIARY CIRRHOSIS (HCC): Primary | ICD-10-CM

## 2020-08-20 NOTE — PROGRESS NOTES
[facility] at [facility]  [address], [address]  [address]  [phone] ([phone]) '19 . Empiric trial of prednisone early April '19 resulted in normalization of LFT. Transitioned to low dose AZA.       Patient phoned for follow-up. Feeling good.  Had recent onset of weight gain (8 lbs) shortly after having Reclast infusion for osteoporo Mother    • Other (hemochromatosis) Mother    • Other (AAA) Mother    • Diabetes Paternal Grandmother    • Diabetes Maternal Grandfather    • Heart Disorder Paternal Grandfather    • Heart Disease Other    • Cancer Neg    • Stroke Neg        Current Outpat Years:  2.00        Pack years: .5        Types: Cigarettes      Smokeless tobacco: Never Used      Tobacco comment: Very light smoker briefly in my 19's & late 29's    Substance and Sexual Activity      Alcohol use: Not Currently        Frequency: Monthly

## 2020-08-24 DIAGNOSIS — E03.8 SUBCLINICAL HYPOTHYROIDISM: ICD-10-CM

## 2020-08-24 NOTE — TELEPHONE ENCOUNTER
Last Ov: 9/4/19, TB, CPE  Last labs: PT, CBC, CMP, AFP, Monoclonal protein, Immunoglobulin A/G/M 7/27/20   Last Rx: levothyroxine 25mcg, #90, 2R 11/18/19    Future Appointments   Date Time Provider Maynor Hinson   9/9/2020  9:00 AM Irene Alpers, MD E

## 2020-08-25 RX ORDER — LEVOTHYROXINE SODIUM 0.03 MG/1
TABLET ORAL
Qty: 90 TABLET | Refills: 0 | Status: SHIPPED | OUTPATIENT
Start: 2020-08-25 | End: 2020-11-28

## 2020-09-02 ENCOUNTER — LAB ENCOUNTER (OUTPATIENT)
Dept: LAB | Facility: HOSPITAL | Age: 70
End: 2020-09-02
Attending: INTERNAL MEDICINE
Payer: MEDICARE

## 2020-09-02 DIAGNOSIS — I10 ESSENTIAL HYPERTENSION: ICD-10-CM

## 2020-09-02 DIAGNOSIS — Z13.29 THYROID DISORDER SCREENING: ICD-10-CM

## 2020-09-02 DIAGNOSIS — Z00.00 ENCOUNTER FOR ANNUAL HEALTH EXAMINATION: ICD-10-CM

## 2020-09-02 LAB
CHOLEST SMN-MCNC: 141 MG/DL (ref ?–200)
HDLC SERPL-MCNC: 68 MG/DL (ref 40–59)
LDLC SERPL CALC-MCNC: 52 MG/DL (ref ?–100)
NONHDLC SERPL-MCNC: 73 MG/DL (ref ?–130)
PATIENT FASTING Y/N/NP: YES
T4 FREE SERPL-MCNC: 0.9 NG/DL (ref 0.8–1.7)
TRIGL SERPL-MCNC: 107 MG/DL (ref 30–149)
TSI SER-ACNC: 4.35 MIU/ML (ref 0.36–3.74)
VLDLC SERPL CALC-MCNC: 21 MG/DL (ref 0–30)

## 2020-09-02 PROCEDURE — 84439 ASSAY OF FREE THYROXINE: CPT

## 2020-09-02 PROCEDURE — 36415 COLL VENOUS BLD VENIPUNCTURE: CPT

## 2020-09-02 PROCEDURE — 80061 LIPID PANEL: CPT

## 2020-09-02 PROCEDURE — 84443 ASSAY THYROID STIM HORMONE: CPT

## 2020-09-09 ENCOUNTER — OFFICE VISIT (OUTPATIENT)
Dept: INTERNAL MEDICINE CLINIC | Facility: CLINIC | Age: 70
End: 2020-09-09
Payer: MEDICARE

## 2020-09-09 VITALS
TEMPERATURE: 99 F | BODY MASS INDEX: 24.54 KG/M2 | HEIGHT: 60 IN | WEIGHT: 125 LBS | SYSTOLIC BLOOD PRESSURE: 132 MMHG | HEART RATE: 64 BPM | RESPIRATION RATE: 18 BRPM | DIASTOLIC BLOOD PRESSURE: 76 MMHG

## 2020-09-09 DIAGNOSIS — M81.0 SENILE OSTEOPOROSIS: ICD-10-CM

## 2020-09-09 DIAGNOSIS — Z00.00 ENCOUNTER FOR ANNUAL HEALTH EXAMINATION: Primary | ICD-10-CM

## 2020-09-09 DIAGNOSIS — L98.9 SKIN LESION OF LEFT LEG: ICD-10-CM

## 2020-09-09 DIAGNOSIS — D69.6 THROMBOCYTOPENIA (HCC): ICD-10-CM

## 2020-09-09 DIAGNOSIS — D70.9 NEUTROPENIA, UNSPECIFIED TYPE (HCC): ICD-10-CM

## 2020-09-09 DIAGNOSIS — E03.8 OTHER SPECIFIED HYPOTHYROIDISM: ICD-10-CM

## 2020-09-09 DIAGNOSIS — Z23 NEED FOR INFLUENZA VACCINATION: ICD-10-CM

## 2020-09-09 DIAGNOSIS — I10 ESSENTIAL HYPERTENSION: ICD-10-CM

## 2020-09-09 DIAGNOSIS — R92.1 BREAST CALCIFICATIONS: ICD-10-CM

## 2020-09-09 DIAGNOSIS — K74.3 PRIMARY BILIARY CIRRHOSIS (HCC): ICD-10-CM

## 2020-09-09 DIAGNOSIS — I34.0 NON-RHEUMATIC MITRAL REGURGITATION: ICD-10-CM

## 2020-09-09 DIAGNOSIS — Z12.31 ENCOUNTER FOR SCREENING MAMMOGRAM FOR MALIGNANT NEOPLASM OF BREAST: ICD-10-CM

## 2020-09-09 DIAGNOSIS — I85.00 ESOPHAGEAL VARICES WITHOUT BLEEDING, UNSPECIFIED ESOPHAGEAL VARICES TYPE (HCC): ICD-10-CM

## 2020-09-09 PROCEDURE — G0008 ADMIN INFLUENZA VIRUS VAC: HCPCS | Performed by: INTERNAL MEDICINE

## 2020-09-09 PROCEDURE — 90662 IIV NO PRSV INCREASED AG IM: CPT | Performed by: INTERNAL MEDICINE

## 2020-09-09 PROCEDURE — G0439 PPPS, SUBSEQ VISIT: HCPCS | Performed by: INTERNAL MEDICINE

## 2020-09-09 NOTE — PROGRESS NOTES
HPI:   Mike Ha is a 65year old female who presents for a Medicare Subsequent Annual Wellness visit (Pt already had Initial Annual Wellness).        Patient with PBC  (sees Dr. Orestes Purdy and Dr. Netta Mcnamara), HTN, mild CAD, hypothyroidism, HTN, senile ost has it on her medication list.   CAGE Alcohol screening   Myrtha Dubin was screened for Alcohol abuse and had a score of 0 so is at low risk.     Patient Care Team: Patient Care Team:  Isabela Angulo MD as PCP - General (Internal Medicine)  Marichuy Altman ONE AND ONE-HALF TABLETS BY MOUTH DAILY  AZATHIOPRINE 50 MG Oral Tab, TAKE ONE-HALF TABLET(25 MG) BY MOUTH DAILY (Patient taking differently: 1/2 tablet every other day per Dr. Shannon Singh )  losartan Potassium 50 MG Oral Tab, Take 1 tablet (50 mg total) by mout cigarettes. She has a 0.50 pack-year smoking history. She has never used smokeless tobacco. She reports previous alcohol use. She reports that she does not use drugs.      REVIEW OF SYSTEMS:      Negative for fevers, chills, chest pains, SOB, abdominal pain 09/09/2020   • FLUAD High Dose 65 yr and older (41680) 10/11/2018   • Fluzone Vaccine Medicare () 02/16/2018   • HIGH DOSE FLU 65 YRS AND OLDER PRSV FREE SINGLE D (67860) FLU CLINIC 02/16/2018   • Influenza 10/15/2012, 10/15/2013   • Pneumococcal (Pre six months, have you lost more than 10 pounds without trying?: 2 - No  Has your appetite been poor?: No  How does the patient maintain a good energy level?: Daily Walks  How would you describe your daily physical activity?: Moderate  How would you describe applicable    Chlamydia  Annually if high risk No results found for: CHLAMYDIA No flowsheet data found.     Screening Mammogram      Mammogram Annually to 74, then as discussed Mammogram due on 09/10/2020 Update Health Maintenance if applicable     Immuniza ANNUAL ASSESSMENT FEMALE [85851]

## 2020-09-09 NOTE — PATIENT INSTRUCTIONS
Barbara Phoenix's SCREENING SCHEDULE   Tests on this list are recommended by your physician but may not be covered, or covered at this frequency, by your insurer. Please check with your insurance carrier before scheduling to verify coverage.    AAKASH criteria:   • Men who are 73-68 years old and have smoked more than 100 cigarettes in their lifetime   • Anyone with a family history    Colorectal Cancer Screening  Covered up to Age 76     Colonoscopy Screen   Covered every 10 years- more often if abnorm performed in visit on 10/02/19   • FLU VACC HIGH DOSE PRSV FREE   Orders placed or performed in visit on 02/16/18   • FLU VACC HIGH DOSE PRSV FREE    Please get every year    Pneumococcal 13 (Prevnar)  Covered Once after 65 Orders placed or performed in vi are also available in [place]  www. putitinwriting. org  This link also has information from the [facility] regarding Advance Directives.

## 2020-09-18 ENCOUNTER — HOSPITAL ENCOUNTER (OUTPATIENT)
Dept: MAMMOGRAPHY | Age: 70
Discharge: HOME | End: 2020-09-18
Attending: INTERNAL MEDICINE
Payer: MEDICARE

## 2020-09-18 DIAGNOSIS — Z12.31 ENCOUNTER FOR SCREENING MAMMOGRAM FOR MALIGNANT NEOPLASM OF BREAST: ICD-10-CM

## 2020-09-18 DIAGNOSIS — R92.1 BREAST CALCIFICATIONS: ICD-10-CM

## 2020-09-18 PROCEDURE — 77063 BREAST TOMOSYNTHESIS BI: CPT | Performed by: INTERNAL MEDICINE

## 2020-09-18 PROCEDURE — 77067 SCR MAMMO BI INCL CAD: CPT | Performed by: INTERNAL MEDICINE

## 2020-10-23 ENCOUNTER — LAB ENCOUNTER (OUTPATIENT)
Dept: LAB | Facility: HOSPITAL | Age: 70
End: 2020-10-23
Attending: INTERNAL MEDICINE
Payer: MEDICARE

## 2020-10-23 DIAGNOSIS — Z00.00 ENCOUNTER FOR ANNUAL HEALTH EXAMINATION: ICD-10-CM

## 2020-10-23 DIAGNOSIS — I10 ESSENTIAL HYPERTENSION: ICD-10-CM

## 2020-10-23 DIAGNOSIS — E03.8 OTHER SPECIFIED HYPOTHYROIDISM: ICD-10-CM

## 2020-10-23 DIAGNOSIS — K74.3 PRIMARY BILIARY CIRRHOSIS (HCC): ICD-10-CM

## 2020-10-23 DIAGNOSIS — Z13.0 SCREENING FOR BLOOD DISEASE: ICD-10-CM

## 2020-10-23 DIAGNOSIS — K75.4 AUTOIMMUNE HEPATITIS (HCC): ICD-10-CM

## 2020-10-23 PROCEDURE — 84439 ASSAY OF FREE THYROXINE: CPT

## 2020-10-23 PROCEDURE — 85025 COMPLETE CBC W/AUTO DIFF WBC: CPT

## 2020-10-23 PROCEDURE — 80299 QUANTITATIVE ASSAY DRUG: CPT

## 2020-10-23 PROCEDURE — 36415 COLL VENOUS BLD VENIPUNCTURE: CPT

## 2020-10-23 PROCEDURE — 80053 COMPREHEN METABOLIC PANEL: CPT

## 2020-10-23 PROCEDURE — 85610 PROTHROMBIN TIME: CPT

## 2020-10-23 PROCEDURE — 84443 ASSAY THYROID STIM HORMONE: CPT

## 2020-10-23 PROCEDURE — 82248 BILIRUBIN DIRECT: CPT

## 2020-10-29 ENCOUNTER — LAB ENCOUNTER (OUTPATIENT)
Dept: LAB | Facility: HOSPITAL | Age: 70
End: 2020-10-29
Attending: INTERNAL MEDICINE
Payer: MEDICARE

## 2020-10-29 DIAGNOSIS — K74.3 CHOLANGITIC CIRRHOSIS (HCC): ICD-10-CM

## 2020-10-29 PROCEDURE — 80299 QUANTITATIVE ASSAY DRUG: CPT

## 2020-11-17 RX ORDER — PROPRANOLOL HYDROCHLORIDE 20 MG/1
20 TABLET ORAL 2 TIMES DAILY
Qty: 180 TABLET | Refills: 0 | Status: SHIPPED | OUTPATIENT
Start: 2020-11-17 | End: 2021-02-24

## 2020-11-17 NOTE — TELEPHONE ENCOUNTER
Last VISIT 09/09/20    Last REFILL 08/10/20 qty 180 w/0 refills    Last LABS 10/23/20 CBC, CMP, PTT, TSH done    No Future Appointments      Per PROTOCOL? Passed    Please Approve or Deny.

## 2020-11-26 DIAGNOSIS — E03.8 SUBCLINICAL HYPOTHYROIDISM: ICD-10-CM

## 2020-11-28 RX ORDER — LEVOTHYROXINE SODIUM 0.03 MG/1
TABLET ORAL
Qty: 90 TABLET | Refills: 0 | Status: SHIPPED | OUTPATIENT
Start: 2020-11-28 | End: 2021-03-05

## 2020-11-28 NOTE — TELEPHONE ENCOUNTER
PASSED per protocol, refill sent.   Last PE 9.9.20   Future Appointments   Date Time Provider Maynor Cokeri   2/5/2021 10:30 AM Phong Clay MD [address] [place], [place] [address] [facility]   5/11/2021 10:00 AM Leisa Gaitan MD [facility]

## 2021-01-13 ENCOUNTER — LAB ENCOUNTER (OUTPATIENT)
Dept: LAB | Facility: HOSPITAL | Age: 71
End: 2021-01-13
Attending: INTERNAL MEDICINE
Payer: MEDICARE

## 2021-01-13 DIAGNOSIS — R82.90 BAD ODOR OF URINE: ICD-10-CM

## 2021-01-13 LAB
BILIRUB UR QL STRIP.AUTO: NEGATIVE
GLUCOSE UR STRIP.AUTO-MCNC: NEGATIVE MG/DL
NITRITE UR QL STRIP.AUTO: POSITIVE
PH UR STRIP.AUTO: 5 [PH] (ref 4.5–8)
PROT UR STRIP.AUTO-MCNC: NEGATIVE MG/DL
RBC UR QL AUTO: NEGATIVE
SP GR UR STRIP.AUTO: 1.03 (ref 1–1.03)
UROBILINOGEN UR STRIP.AUTO-MCNC: <2 MG/DL

## 2021-01-13 PROCEDURE — 81001 URINALYSIS AUTO W/SCOPE: CPT

## 2021-01-14 ENCOUNTER — OFFICE VISIT (OUTPATIENT)
Dept: INTERNAL MEDICINE CLINIC | Facility: CLINIC | Age: 71
End: 2021-01-14
Payer: MEDICARE

## 2021-01-14 VITALS
DIASTOLIC BLOOD PRESSURE: 64 MMHG | HEART RATE: 74 BPM | OXYGEN SATURATION: 96 % | BODY MASS INDEX: 24.19 KG/M2 | SYSTOLIC BLOOD PRESSURE: 122 MMHG | HEIGHT: 60 IN | WEIGHT: 123.19 LBS | TEMPERATURE: 98 F

## 2021-01-14 DIAGNOSIS — R82.90 BAD ODOR OF URINE: Primary | ICD-10-CM

## 2021-01-14 PROCEDURE — 99213 OFFICE O/P EST LOW 20 MIN: CPT | Performed by: INTERNAL MEDICINE

## 2021-01-14 RX ORDER — NITROFURANTOIN 25; 75 MG/1; MG/1
100 CAPSULE ORAL 2 TIMES DAILY
Qty: 10 CAPSULE | Refills: 0 | Status: SHIPPED | OUTPATIENT
Start: 2021-01-14 | End: 2021-02-05 | Stop reason: ALTCHOICE

## 2021-01-14 NOTE — PROGRESS NOTES
Betty Schaefer is a 65year old female. Patient presents with:  Urinary Symptoms: mn room 3 pt here for urine infection       HPI:     C/o bad odor to urine for several weeks. No dysuria, n/v/f/c/flank pain. Similar symptoms in past uti.   Urine dip with mouth daily. • aspirin 81 MG Oral Tab Take 81 mg by mouth daily. • Vitamin D3 1000 UNITS Oral Tab Take 3,000 Units by mouth daily. • ibuprofen (ADVIL) 200 MG Oral Tab Take 200 mg by mouth every 6 (six) hours as needed for Pain.      • Calcium Paternal Grandmother    • Diabetes Maternal Grandfather    • Heart Disorder Paternal Grandfather    • Heart Disease Other    • Cancer Neg    • Stroke Neg         Allergies    Ciprofloxacin           OTHER (SEE COMMENTS)    Comment:Caused vaginal pain and i

## 2021-01-27 ENCOUNTER — HOSPITAL ENCOUNTER (OUTPATIENT)
Dept: MRI IMAGING | Facility: HOSPITAL | Age: 71
Discharge: HOME | End: 2021-01-27
Attending: INTERNAL MEDICINE
Payer: MEDICARE

## 2021-01-27 ENCOUNTER — LAB ENCOUNTER (OUTPATIENT)
Dept: LAB | Facility: HOSPITAL | Age: 71
End: 2021-01-27
Attending: INTERNAL MEDICINE
Payer: MEDICARE

## 2021-01-27 DIAGNOSIS — D47.2 MGUS (MONOCLONAL GAMMOPATHY OF UNKNOWN SIGNIFICANCE): ICD-10-CM

## 2021-01-27 DIAGNOSIS — K86.2 PANCREATIC CYST: ICD-10-CM

## 2021-01-27 DIAGNOSIS — K74.69 OTHER CIRRHOSIS OF LIVER (HCC): ICD-10-CM

## 2021-01-27 DIAGNOSIS — D69.6 THROMBOCYTOPENIA (HCC): ICD-10-CM

## 2021-01-27 DIAGNOSIS — D72.819 LEUKOPENIA, UNSPECIFIED TYPE: ICD-10-CM

## 2021-01-27 LAB
ALBUMIN SERPL-MCNC: 3.6 G/DL (ref 3.4–5)
ALBUMIN/GLOB SERPL: 0.9 {RATIO} (ref 1–2)
ALP LIVER SERPL-CCNC: 90 U/L
ALT SERPL-CCNC: 31 U/L
ANION GAP SERPL CALC-SCNC: 0 MMOL/L (ref 0–18)
AST SERPL-CCNC: 47 U/L (ref 15–37)
BASOPHILS # BLD AUTO: 0.02 X10(3) UL (ref 0–0.2)
BASOPHILS NFR BLD AUTO: 0.9 %
BILIRUB SERPL-MCNC: 1.4 MG/DL (ref 0.1–2)
BUN BLD-MCNC: 21 MG/DL (ref 7–18)
BUN/CREAT SERPL: 22.3 (ref 10–20)
CALCIUM BLD-MCNC: 9.6 MG/DL (ref 8.5–10.1)
CHLORIDE SERPL-SCNC: 107 MMOL/L (ref 98–112)
CO2 SERPL-SCNC: 31 MMOL/L (ref 21–32)
CREAT BLD-MCNC: 0.94 MG/DL
DEPRECATED RDW RBC AUTO: 45.2 FL (ref 35.1–46.3)
EOSINOPHIL # BLD AUTO: 0.13 X10(3) UL (ref 0–0.7)
EOSINOPHIL NFR BLD AUTO: 6 %
ERYTHROCYTE [DISTWIDTH] IN BLOOD BY AUTOMATED COUNT: 12.1 % (ref 11–15)
GLOBULIN PLAS-MCNC: 4 G/DL (ref 2.8–4.4)
GLUCOSE BLD-MCNC: 87 MG/DL (ref 70–99)
HCT VFR BLD AUTO: 39.8 %
HGB BLD-MCNC: 13.5 G/DL
IMM GRANULOCYTES # BLD AUTO: 0 X10(3) UL (ref 0–1)
IMM GRANULOCYTES NFR BLD: 0 %
LYMPHOCYTES # BLD AUTO: 0.5 X10(3) UL (ref 1–4)
LYMPHOCYTES NFR BLD AUTO: 23 %
M PROTEIN MFR SERPL ELPH: 7.6 G/DL (ref 6.4–8.2)
MCH RBC QN AUTO: 34.3 PG (ref 26–34)
MCHC RBC AUTO-ENTMCNC: 33.9 G/DL (ref 31–37)
MCV RBC AUTO: 101 FL
MONOCYTES # BLD AUTO: 0.27 X10(3) UL (ref 0.1–1)
MONOCYTES NFR BLD AUTO: 12.4 %
NEUTROPHILS # BLD AUTO: 1.25 X10 (3) UL (ref 1.5–7.7)
NEUTROPHILS # BLD AUTO: 1.25 X10(3) UL (ref 1.5–7.7)
NEUTROPHILS NFR BLD AUTO: 57.7 %
OSMOLALITY SERPL CALC.SUM OF ELEC: 288 MOSM/KG (ref 275–295)
PATIENT FASTING Y/N/NP: YES
PLATELET # BLD AUTO: 105 10(3)UL (ref 150–450)
POTASSIUM SERPL-SCNC: 4.3 MMOL/L (ref 3.5–5.1)
RBC # BLD AUTO: 3.94 X10(6)UL
SODIUM SERPL-SCNC: 138 MMOL/L (ref 136–145)
WBC # BLD AUTO: 2.2 X10(3) UL (ref 4–11)

## 2021-01-27 PROCEDURE — 83883 ASSAY NEPHELOMETRY NOT SPEC: CPT

## 2021-01-27 PROCEDURE — A9581 GADOXETATE DISODIUM INJ: HCPCS | Performed by: INTERNAL MEDICINE

## 2021-01-27 PROCEDURE — 86334 IMMUNOFIX E-PHORESIS SERUM: CPT

## 2021-01-27 PROCEDURE — 84165 PROTEIN E-PHORESIS SERUM: CPT

## 2021-01-27 PROCEDURE — 85025 COMPLETE CBC W/AUTO DIFF WBC: CPT

## 2021-01-27 PROCEDURE — 74183 MRI ABD W/O CNTR FLWD CNTR: CPT | Performed by: INTERNAL MEDICINE

## 2021-01-27 PROCEDURE — 80053 COMPREHEN METABOLIC PANEL: CPT

## 2021-01-27 PROCEDURE — 36415 COLL VENOUS BLD VENIPUNCTURE: CPT

## 2021-01-29 LAB
ALBUMIN SERPL ELPH-MCNC: 4.1 G/DL (ref 3.75–5.21)
ALBUMIN/GLOB SERPL: 1.17 {RATIO} (ref 1–2)
ALPHA1 GLOB SERPL ELPH-MCNC: 0.24 G/DL (ref 0.19–0.46)
ALPHA2 GLOB SERPL ELPH-MCNC: 0.54 G/DL (ref 0.48–1.05)
B-GLOBULIN SERPL ELPH-MCNC: 0.89 G/DL (ref 0.68–1.23)
GAMMA GLOB SERPL ELPH-MCNC: 1.83 G/DL (ref 0.62–1.7)
KAPPA LC FREE SER-MCNC: 6.17 MG/DL (ref 0.33–1.94)
KAPPA LC FREE/LAMBDA FREE SER NEPH: 1.26 {RATIO} (ref 0.26–1.65)
LAMBDA LC FREE SERPL-MCNC: 4.89 MG/DL (ref 0.57–2.63)
M PROTEIN MFR SERPL ELPH: 7.6 G/DL (ref 6.4–8.2)

## 2021-02-05 ENCOUNTER — OFFICE VISIT (OUTPATIENT)
Dept: HEMATOLOGY/ONCOLOGY | Facility: HOSPITAL | Age: 71
End: 2021-02-05
Attending: INTERNAL MEDICINE
Payer: MEDICARE

## 2021-02-05 VITALS
WEIGHT: 123 LBS | RESPIRATION RATE: 16 BRPM | HEIGHT: 60 IN | HEART RATE: 61 BPM | OXYGEN SATURATION: 97 % | SYSTOLIC BLOOD PRESSURE: 153 MMHG | TEMPERATURE: 98 F | DIASTOLIC BLOOD PRESSURE: 74 MMHG | BODY MASS INDEX: 24.15 KG/M2

## 2021-02-05 DIAGNOSIS — D69.6 THROMBOCYTOPENIA (HCC): ICD-10-CM

## 2021-02-05 DIAGNOSIS — D72.819 LEUKOPENIA, UNSPECIFIED TYPE: ICD-10-CM

## 2021-02-05 DIAGNOSIS — D47.2 MGUS (MONOCLONAL GAMMOPATHY OF UNKNOWN SIGNIFICANCE): Primary | ICD-10-CM

## 2021-02-05 PROCEDURE — 99213 OFFICE O/P EST LOW 20 MIN: CPT | Performed by: INTERNAL MEDICINE

## 2021-02-05 NOTE — PROGRESS NOTES
Cancer Center Progress Note  Patient Name: Greer Severin   YOB: 1950   Medical Record Number: [id number]     Attending Physician: Kashmir Khoury M.D. Date of Visit: 2/5/2021    Chief Complaint:  Patient presents with:   Follow intradermal melanocytic nevus w/congenital features   • Wears glasses    • Weight gain     Sudden onset of rapid weight gain (9-10 lbs.)       Past Surgical History:  Past Surgical History:   Procedure Laterality Date   • ANGIOGRAM     • COLONOSCOPY  02/16 use: Not Currently        Frequency: Monthly or less        Drinks per session: 1 or 2        Binge frequency: Never        Comment: Rare--1-2 drinks a year.       Drug use: No      Sexual activity: Never    Lifestyle      Physical activity        Days per Tab, TAKE ONE AND ONE-HALF TABLETS BY MOUTH DAILY, Disp: 135 tablet, Rfl: 0  •  AZATHIOPRINE 50 MG Oral Tab, TAKE ONE-HALF TABLET(25 MG) BY MOUTH DAILY (Patient taking differently: 1/2 tablet every other day per Dr. Brice Sims ), Disp: 30 tablet, Rfl: 3  •  los pelon or mood swings. Vital Signs:  LMP  (LMP Unknown)     Physical Examination:    Constitutional Normal - Mood and affect appropriate. Appears close to chronological age. Well nourished. Well developed.    Eyes Normal - Conjunctivae and sclerae ar Total Bilirubin Latest Ref Range: 0.1 - 2.0 mg/dL 1.4    Globulin Latest Ref Range: 2.8 - 4.4 g/dL 4.0    A/G Ratio Latest Ref Range: 1.0 - 2.0  0.9 (L)    KAPPA FREE LIGHT CHAIN Latest Ref Range: 0.330 - 1.940 mg/dL  6.165 (H)   LAMBDA FREE LIGHT CHAIN Absolute Latest Ref Range: 0.00 - 1.00 x10(3) uL 0.00    Neutrophils % Latest Units: % 57.7    Lymphocytes % Latest Units: % 23.0    Monocytes % Latest Units: % 12.4    Eosinophils % Latest Units: % 6.0    Basophils % Latest Units: % 0.9    Immature Granul

## 2021-02-09 ENCOUNTER — LAB ENCOUNTER (OUTPATIENT)
Dept: LAB | Facility: HOSPITAL | Age: 71
End: 2021-02-09
Attending: INTERNAL MEDICINE
Payer: MEDICARE

## 2021-02-09 DIAGNOSIS — K74.60 CIRRHOSIS, NON-ALCOHOLIC (HCC): ICD-10-CM

## 2021-02-09 LAB
AFP-TM SERPL-MCNC: 7.6 NG/ML (ref ?–8)
INR BLD: 1.17 (ref 0.89–1.11)
PSA SERPL DL<=0.01 NG/ML-MCNC: 15.3 SECONDS (ref 12.4–14.6)

## 2021-02-09 PROCEDURE — 36415 COLL VENOUS BLD VENIPUNCTURE: CPT

## 2021-02-09 PROCEDURE — 82105 ALPHA-FETOPROTEIN SERUM: CPT

## 2021-02-09 PROCEDURE — 85610 PROTHROMBIN TIME: CPT

## 2021-02-17 ENCOUNTER — OFFICE VISIT (OUTPATIENT)
Dept: SURGERY | Facility: CLINIC | Age: 71
End: 2021-02-17
Payer: MEDICARE

## 2021-02-17 VITALS
OXYGEN SATURATION: 98 % | WEIGHT: 123 LBS | DIASTOLIC BLOOD PRESSURE: 74 MMHG | RESPIRATION RATE: 18 BRPM | HEART RATE: 54 BPM | BODY MASS INDEX: 24 KG/M2 | TEMPERATURE: 98 F | SYSTOLIC BLOOD PRESSURE: 133 MMHG

## 2021-02-17 DIAGNOSIS — K74.3 PRIMARY BILIARY CHOLANGITIS (HCC): ICD-10-CM

## 2021-02-17 DIAGNOSIS — K75.4 AUTOIMMUNE HEPATITIS (HCC): Primary | ICD-10-CM

## 2021-02-17 NOTE — PROGRESS NOTES
[facility] at [address], [address] [place] [address] [address]  [address]  [facility]  [phone] ([phone]) increased in July. Continues on ursodiol daily. Chronic pruritis is better - only experiences occasionally. No swelling, confusion, GI bleeding, or jaundice. LFT (AST) slightly elevated. MRI with no liver lesion.     Past Medical History:   Diagnosis Date Current Outpatient Medications   Medication Sig Dispense Refill   • LEVOTHYROXINE SODIUM 25 MCG Oral Tab TAKE 1 TABLET(25 MCG) BY MOUTH BEFORE BREAKFAST 90 tablet 0   • Propranolol HCl 20 MG Oral Tab Take 1 tablet (20 mg total) by mouth 2 (two) times 2        Binge frequency: Never        Comment: Rare--1-2 drinks a year.       Drug use: No      Sexual activity: Never    Exam:  /74 (BP Location: Right arm, Patient Position: Sitting, Cuff Size: adult)   Pulse 54   Temp 97.6 °F (36.4 °C) (Tympanic)

## 2021-02-24 RX ORDER — PROPRANOLOL HYDROCHLORIDE 20 MG/1
TABLET ORAL
Qty: 180 TABLET | Refills: 0 | Status: SHIPPED | OUTPATIENT
Start: 2021-02-24 | End: 2021-06-07

## 2021-03-05 DIAGNOSIS — E03.8 SUBCLINICAL HYPOTHYROIDISM: ICD-10-CM

## 2021-03-05 RX ORDER — LEVOTHYROXINE SODIUM 0.03 MG/1
TABLET ORAL
Qty: 90 TABLET | Refills: 0 | Status: SHIPPED | OUTPATIENT
Start: 2021-03-05 | End: 2021-06-07

## 2021-03-05 NOTE — TELEPHONE ENCOUNTER
LOV:1/14/21, urine issue, TB  Last CPE:9/9/20, CPE, TB  Last refill:LEVOTHYROXINE SODIUM 25 MCG Oral Tab,11/28/20  Quantity:90 tablet, 0 refills  Last labs that are related: cbc 1/27/21  Future appointment:  Future Appointments   Date Time Provider Departm

## 2021-03-08 DIAGNOSIS — Z23 NEED FOR VACCINATION: ICD-10-CM

## 2021-03-10 ENCOUNTER — IMMUNIZATION (OUTPATIENT)
Dept: LAB | Age: 71
End: 2021-03-10
Attending: HOSPITALIST
Payer: MEDICARE

## 2021-03-10 DIAGNOSIS — Z23 NEED FOR VACCINATION: Primary | ICD-10-CM

## 2021-03-10 PROCEDURE — 0001A SARSCOV2 VAC 30MCG/0.3ML IM: CPT | Performed by: NURSE PRACTITIONER

## 2021-03-31 ENCOUNTER — IMMUNIZATION (OUTPATIENT)
Dept: LAB | Age: 71
End: 2021-03-31
Attending: NURSE PRACTITIONER
Payer: MEDICARE

## 2021-03-31 DIAGNOSIS — Z23 NEED FOR VACCINATION: Primary | ICD-10-CM

## 2021-03-31 PROCEDURE — 0002A SARSCOV2 VAC 30MCG/0.3ML IM: CPT

## 2021-05-11 PROBLEM — D47.2 MONOCLONAL GAMMOPATHY OF UNKNOWN SIGNIFICANCE (MGUS): Status: ACTIVE | Noted: 2021-05-11

## 2021-05-17 ENCOUNTER — LAB ENCOUNTER (OUTPATIENT)
Dept: LAB | Facility: HOSPITAL | Age: 71
End: 2021-05-17
Attending: STUDENT IN AN ORGANIZED HEALTH CARE EDUCATION/TRAINING PROGRAM
Payer: MEDICARE

## 2021-05-17 DIAGNOSIS — K74.3 PRIMARY BILIARY CHOLANGITIS (HCC): ICD-10-CM

## 2021-05-17 DIAGNOSIS — K75.4 AUTOIMMUNE HEPATITIS (HCC): ICD-10-CM

## 2021-05-17 PROCEDURE — 80299 QUANTITATIVE ASSAY DRUG: CPT

## 2021-05-17 PROCEDURE — 85025 COMPLETE CBC W/AUTO DIFF WBC: CPT

## 2021-05-17 PROCEDURE — 36415 COLL VENOUS BLD VENIPUNCTURE: CPT

## 2021-05-17 PROCEDURE — 80053 COMPREHEN METABOLIC PANEL: CPT

## 2021-05-27 ENCOUNTER — LAB ENCOUNTER (OUTPATIENT)
Dept: LAB | Facility: HOSPITAL | Age: 71
End: 2021-05-27
Attending: STUDENT IN AN ORGANIZED HEALTH CARE EDUCATION/TRAINING PROGRAM
Payer: MEDICARE

## 2021-05-27 DIAGNOSIS — K75.4 AUTOIMMUNE HEPATITIS (HCC): ICD-10-CM

## 2021-05-27 DIAGNOSIS — K74.3 PRIMARY BILIARY CHOLANGITIS (HCC): ICD-10-CM

## 2021-05-27 PROCEDURE — 36415 COLL VENOUS BLD VENIPUNCTURE: CPT

## 2021-05-27 PROCEDURE — 80299 QUANTITATIVE ASSAY DRUG: CPT

## 2021-06-07 DIAGNOSIS — E03.8 SUBCLINICAL HYPOTHYROIDISM: ICD-10-CM

## 2021-06-07 RX ORDER — PROPRANOLOL HYDROCHLORIDE 20 MG/1
TABLET ORAL
Qty: 180 TABLET | Refills: 0 | Status: SHIPPED | OUTPATIENT
Start: 2021-06-07 | End: 2021-09-17

## 2021-06-07 RX ORDER — LEVOTHYROXINE SODIUM 0.03 MG/1
TABLET ORAL
Qty: 90 TABLET | Refills: 0 | Status: SHIPPED | OUTPATIENT
Start: 2021-06-07 | End: 2021-09-28

## 2021-07-25 ENCOUNTER — HOSPITAL ENCOUNTER (OUTPATIENT)
Age: 71
Discharge: HOME | End: 2021-07-25
Payer: MEDICARE

## 2021-07-25 ENCOUNTER — APPOINTMENT (OUTPATIENT)
Dept: GENERAL RADIOLOGY | Age: 71
End: 2021-07-25
Attending: NURSE PRACTITIONER
Payer: MEDICARE

## 2021-07-25 VITALS
HEART RATE: 64 BPM | RESPIRATION RATE: 16 BRPM | BODY MASS INDEX: 22.66 KG/M2 | TEMPERATURE: 98 F | WEIGHT: 120 LBS | SYSTOLIC BLOOD PRESSURE: 162 MMHG | DIASTOLIC BLOOD PRESSURE: 78 MMHG | OXYGEN SATURATION: 98 % | HEIGHT: 61 IN

## 2021-07-25 DIAGNOSIS — S96.912A MUSCLE STRAIN OF LEFT FOOT, INITIAL ENCOUNTER: ICD-10-CM

## 2021-07-25 DIAGNOSIS — M19.072 ARTHRITIS OF LEFT FOOT: ICD-10-CM

## 2021-07-25 DIAGNOSIS — M79.672 LEFT FOOT PAIN: Primary | ICD-10-CM

## 2021-07-25 PROCEDURE — 99204 OFFICE O/P NEW MOD 45 MIN: CPT | Performed by: NURSE PRACTITIONER

## 2021-07-25 PROCEDURE — 73630 X-RAY EXAM OF FOOT: CPT | Performed by: NURSE PRACTITIONER

## 2021-07-25 NOTE — ED PROVIDER NOTES
Patient Seen in: Immediate [address] [place] [address]      History   Patient presents with:  Pain    Stated Complaint: Foot Pain    HPI/Subjective:   68-year-old female presents to immediate care for left foot pain.   Patient states over the last 3 days she ha comment: Very light smoker briefly in my 19's & late 29's    Vaping Use      Vaping Use: Never used    Alcohol use: Not Currently      Comment: Rare--1-2 drinks a year. Drug use: No             Review of Systems   Constitutional: Negative.     Respirator PATIENT STATED HISTORY: (As transcribed by Technologist)  Patient has left foot pain with swelling for 3 days. She did not have an injury or trauma to the foot. FINDINGS:  BONES:  There is no evidence of erosive arthropathy.   There is severe joint space pm    Follow-up:  No follow-up provider specified.         Medications Prescribed:  Current Discharge Medication List

## 2021-07-29 ENCOUNTER — LAB ENCOUNTER (OUTPATIENT)
Dept: LAB | Facility: HOSPITAL | Age: 71
End: 2021-07-29
Attending: INTERNAL MEDICINE
Payer: MEDICARE

## 2021-07-29 ENCOUNTER — HOSPITAL ENCOUNTER (OUTPATIENT)
Dept: MRI IMAGING | Facility: HOSPITAL | Age: 71
Discharge: HOME | End: 2021-07-29
Attending: STUDENT IN AN ORGANIZED HEALTH CARE EDUCATION/TRAINING PROGRAM
Payer: MEDICARE

## 2021-07-29 DIAGNOSIS — D69.6 THROMBOCYTOPENIA (HCC): ICD-10-CM

## 2021-07-29 DIAGNOSIS — K75.4 AUTOIMMUNE HEPATITIS (HCC): ICD-10-CM

## 2021-07-29 DIAGNOSIS — D72.819 LEUKOPENIA, UNSPECIFIED TYPE: ICD-10-CM

## 2021-07-29 DIAGNOSIS — K74.3 PRIMARY BILIARY CIRRHOSIS (HCC): ICD-10-CM

## 2021-07-29 DIAGNOSIS — D47.2 MGUS (MONOCLONAL GAMMOPATHY OF UNKNOWN SIGNIFICANCE): ICD-10-CM

## 2021-07-29 DIAGNOSIS — K74.3 PRIMARY BILIARY CHOLANGITIS (HCC): ICD-10-CM

## 2021-07-29 DIAGNOSIS — K74.69 OTHER CIRRHOSIS OF LIVER (HCC): ICD-10-CM

## 2021-07-29 LAB
AFP-TM SERPL-MCNC: 7.6 NG/ML (ref ?–8)
ALBUMIN SERPL-MCNC: 3.7 G/DL (ref 3.4–5)
ALBUMIN/GLOB SERPL: 0.8 {RATIO} (ref 1–2)
ALP LIVER SERPL-CCNC: 114 U/L
ALT SERPL-CCNC: 66 U/L
ANION GAP SERPL CALC-SCNC: 5 MMOL/L (ref 0–18)
AST SERPL-CCNC: 98 U/L (ref 15–37)
BASOPHILS # BLD AUTO: 0.02 X10(3) UL (ref 0–0.2)
BASOPHILS NFR BLD AUTO: 0.8 %
BILIRUB SERPL-MCNC: 2.3 MG/DL (ref 0.1–2)
BUN BLD-MCNC: 17 MG/DL (ref 7–18)
CALCIUM BLD-MCNC: 8.9 MG/DL (ref 8.5–10.1)
CHLORIDE SERPL-SCNC: 106 MMOL/L (ref 98–112)
CO2 SERPL-SCNC: 28 MMOL/L (ref 21–32)
CREAT BLD-MCNC: 1.08 MG/DL
EOSINOPHIL # BLD AUTO: 0.14 X10(3) UL (ref 0–0.7)
EOSINOPHIL NFR BLD AUTO: 5.6 %
ERYTHROCYTE [DISTWIDTH] IN BLOOD BY AUTOMATED COUNT: 12.5 %
GLOBULIN PLAS-MCNC: 4.4 G/DL (ref 2.8–4.4)
GLUCOSE BLD-MCNC: 88 MG/DL (ref 70–99)
HCT VFR BLD AUTO: 38 %
HGB BLD-MCNC: 13.4 G/DL
IMM GRANULOCYTES # BLD AUTO: 0.01 X10(3) UL (ref 0–1)
IMM GRANULOCYTES NFR BLD: 0.4 %
INR BLD: 1.13 (ref 0.89–1.11)
LYMPHOCYTES # BLD AUTO: 0.64 X10(3) UL (ref 1–4)
LYMPHOCYTES NFR BLD AUTO: 25.5 %
M PROTEIN MFR SERPL ELPH: 8.1 G/DL (ref 6.4–8.2)
MCH RBC QN AUTO: 34.7 PG (ref 26–34)
MCHC RBC AUTO-ENTMCNC: 35.3 G/DL (ref 31–37)
MCV RBC AUTO: 98.4 FL
MONOCYTES # BLD AUTO: 0.32 X10(3) UL (ref 0.1–1)
MONOCYTES NFR BLD AUTO: 12.7 %
NEUTROPHILS # BLD AUTO: 1.38 X10 (3) UL (ref 1.5–7.7)
NEUTROPHILS # BLD AUTO: 1.38 X10(3) UL (ref 1.5–7.7)
NEUTROPHILS NFR BLD AUTO: 55 %
OSMOLALITY SERPL CALC.SUM OF ELEC: 289 MOSM/KG (ref 275–295)
PATIENT FASTING Y/N/NP: YES
PLATELET # BLD AUTO: 122 10(3)UL (ref 150–450)
POTASSIUM SERPL-SCNC: 4.1 MMOL/L (ref 3.5–5.1)
PSA SERPL DL<=0.01 NG/ML-MCNC: 14.8 SECONDS (ref 12.2–14.5)
RBC # BLD AUTO: 3.86 X10(6)UL
SODIUM SERPL-SCNC: 139 MMOL/L (ref 136–145)
WBC # BLD AUTO: 2.5 X10(3) UL (ref 4–11)

## 2021-07-29 PROCEDURE — 74183 MRI ABD W/O CNTR FLWD CNTR: CPT | Performed by: STUDENT IN AN ORGANIZED HEALTH CARE EDUCATION/TRAINING PROGRAM

## 2021-07-29 PROCEDURE — 80053 COMPREHEN METABOLIC PANEL: CPT

## 2021-07-29 PROCEDURE — A9581 GADOXETATE DISODIUM INJ: HCPCS | Performed by: STUDENT IN AN ORGANIZED HEALTH CARE EDUCATION/TRAINING PROGRAM

## 2021-07-29 PROCEDURE — 36415 COLL VENOUS BLD VENIPUNCTURE: CPT

## 2021-07-29 PROCEDURE — 85610 PROTHROMBIN TIME: CPT

## 2021-07-29 PROCEDURE — 82105 ALPHA-FETOPROTEIN SERUM: CPT

## 2021-07-29 PROCEDURE — 85025 COMPLETE CBC W/AUTO DIFF WBC: CPT

## 2021-07-30 NOTE — PROGRESS NOTES
Mrs Perlita Settler show slight increase in liver enzymes, but not dramatic or worrisome at this time. Will discuss further at your upcoming office visit.   Please call with any questions,    Mane Garcia MD

## 2021-07-30 NOTE — PROGRESS NOTES
Mrs Jim Chinchilla,     My name is Lazaro Lewis, and I will be taking over your case from Dr Susana Jin. I wanted to let you know that your MRI appears stable, continues to show fatty liver and changes that could represent cirrhosis.   Will discuss further at your upc

## 2021-08-06 ENCOUNTER — APPOINTMENT (OUTPATIENT)
Dept: HEMATOLOGY/ONCOLOGY | Facility: HOSPITAL | Age: 71
End: 2021-08-06
Attending: INTERNAL MEDICINE
Payer: MEDICARE

## 2021-08-12 ENCOUNTER — TELEPHONE (OUTPATIENT)
Dept: INTERNAL MEDICINE CLINIC | Facility: CLINIC | Age: 71
End: 2021-08-12

## 2021-08-12 DIAGNOSIS — E03.8 SUBCLINICAL HYPOTHYROIDISM: ICD-10-CM

## 2021-08-12 DIAGNOSIS — Z00.00 ROUTINE GENERAL MEDICAL EXAMINATION AT A HEALTH CARE FACILITY: Primary | ICD-10-CM

## 2021-08-12 DIAGNOSIS — I10 ESSENTIAL HYPERTENSION: ICD-10-CM

## 2021-08-12 NOTE — TELEPHONE ENCOUNTER
Future Appointments   Date Time Provider Maynor Hinson   10/1/2021  1:40 PM Marjan Asif MD [facility]     Orders to edawrd- Pt informed that labs need to be completed no sooner than 2 weeks prior to the appt.  Pt aware to fast-no call back

## 2021-08-24 ENCOUNTER — OFFICE VISIT (OUTPATIENT)
Dept: PODIATRY CLINIC | Facility: CLINIC | Age: 71
End: 2021-08-24
Payer: MEDICARE

## 2021-08-24 VITALS
HEIGHT: 61 IN | SYSTOLIC BLOOD PRESSURE: 130 MMHG | BODY MASS INDEX: 22.28 KG/M2 | RESPIRATION RATE: 20 BRPM | DIASTOLIC BLOOD PRESSURE: 62 MMHG | WEIGHT: 118 LBS | HEART RATE: 60 BPM

## 2021-08-24 DIAGNOSIS — B35.1 ONYCHOMYCOSIS: ICD-10-CM

## 2021-08-24 DIAGNOSIS — M77.50 CAPSULITIS OF METATARSOPHALANGEAL (MTP) JOINT: ICD-10-CM

## 2021-08-24 DIAGNOSIS — M19.079 ARTHRITIS OF METATARSOPHALANGEAL (MTP) JOINT OF GREAT TOE: Primary | ICD-10-CM

## 2021-08-24 DIAGNOSIS — M20.5X2 HALLUX LIMITUS, LEFT: ICD-10-CM

## 2021-08-24 PROCEDURE — 20600 DRAIN/INJ JOINT/BURSA W/O US: CPT | Performed by: PODIATRIST

## 2021-08-24 PROCEDURE — 99203 OFFICE O/P NEW LOW 30 MIN: CPT | Performed by: PODIATRIST

## 2021-08-24 RX ORDER — TRIAMCINOLONE ACETONIDE 40 MG/ML
20 INJECTION, SUSPENSION INTRA-ARTICULAR; INTRAMUSCULAR ONCE
Status: COMPLETED | OUTPATIENT
Start: 2021-08-24 | End: 2021-08-24

## 2021-08-24 RX ADMIN — TRIAMCINOLONE ACETONIDE 20 MG: 40 INJECTION, SUSPENSION INTRA-ARTICULAR; INTRAMUSCULAR at 16:20:00

## 2021-08-24 NOTE — PROGRESS NOTES
Creston Homans is a 66year old female. Patient presents with: Foot Pain: States had left foot/hallux/ankle pain swelling x 4weeks. No nown injury ot trauma   States XR neg.   States pain/swelling 70% improved and almost cancelled appt today   Has some UNITS Oral Tab Take 3,000 Units by mouth daily. • ibuprofen (ADVIL) 200 MG Oral Tab Take 200 mg by mouth every 6 (six) hours as needed for Pain.      • Calcium Carbonate-Vitamin D (CALTRATE 600+D) 600-400 MG-UNIT Oral Tab Take 1 Tab by mouth 2 (two) t Socioeconomic History      Marital status:       Spouse name: Not on file      Number of children: Not on file      Years of education: Not on file      Highest education level: Not on file    Tobacco Use      Smoking status: Former Smoker        P he has fairly good pain on range of motion with simultaneous compression of the first MTP joint on the left foot. I reviewed the x-rays were previously taken showing osteoarthritic changes.     ASSESSMENT AND PLAN:   Diagnoses and all orders for this visit

## 2021-08-24 NOTE — PROGRESS NOTES
HPI/Subjective:   Yudi Curry is a 66year old female who presents for Foot Pain (States had left foot/hallux/ankle pain swelling x 4weeks. No nown injury ot trauma   States XR neg.   States pain/swelling 70% improved and almost cancelled appt today to [facility]::\"[id number] items are noted in HPI. \"}  ***    Objective:   /62 (BP Location: Left arm, Patient Position: Sitting, Cuff Size: adult)   Pulse 60   Resp 20   Ht 5' 1\" (1.549 m)   Wt 118 lb   LMP  (LMP Unknown)   BMI 22.30 kg/m²  Estim

## 2021-08-27 ENCOUNTER — OFFICE VISIT (OUTPATIENT)
Dept: HEMATOLOGY/ONCOLOGY | Facility: HOSPITAL | Age: 71
End: 2021-08-27
Attending: INTERNAL MEDICINE
Payer: MEDICARE

## 2021-08-27 VITALS
TEMPERATURE: 99 F | WEIGHT: 119 LBS | SYSTOLIC BLOOD PRESSURE: 125 MMHG | HEART RATE: 64 BPM | BODY MASS INDEX: 22.47 KG/M2 | RESPIRATION RATE: 16 BRPM | OXYGEN SATURATION: 94 % | HEIGHT: 60.98 IN | DIASTOLIC BLOOD PRESSURE: 62 MMHG

## 2021-08-27 DIAGNOSIS — D69.6 THROMBOCYTOPENIA (HCC): ICD-10-CM

## 2021-08-27 DIAGNOSIS — D72.819 LEUKOPENIA, UNSPECIFIED TYPE: Primary | ICD-10-CM

## 2021-08-27 PROCEDURE — 99213 OFFICE O/P EST LOW 20 MIN: CPT | Performed by: INTERNAL MEDICINE

## 2021-08-27 NOTE — PROGRESS NOTES
Cancer Center Progress Note  Patient Name: Gelacio Stapleton   YOB: 1950   Medical Record Number: [id number]     Attending Physician: Celi Jean-Baptiste M.D. Date of Visit: 8/27/21    Chief Complaint:  Patient presents with:   Follow - intradermal melanocytic nevus w/congenital features   • Wears glasses    • Weight gain     Sudden onset of rapid weight gain (9-10 lbs.)       Past Surgical History:  Past Surgical History:   Procedure Laterality Date   • ANGIOGRAM     • COLONOSCOPY  02/16 Occupational Exposure: Not Asked        Hobby Hazards: Not Asked        Sleep Concern: Not Asked        Stress Concern: Yes        Weight Concern: No        Special Diet: No        Back Care: Not Asked        Exercise: No        Bike Helmet: Not Asked differently: 1/2 tablet every other day per Dr. Dyana Fowler ), Disp: 30 tablet, Rfl: 3  •  losartan Potassium 50 MG Oral Tab, Take 1 tablet (50 mg total) by mouth daily. , Disp: 90 tablet, Rfl: 1  •  famotidine (PEPCID) 40 MG Oral Tab, Take 40 mg by mouth daily. , (Temporal)   Resp 16   Ht 1.549 m (5' 0.98\")   Wt 54 kg (119 lb)   LMP  (LMP Unknown)   SpO2 94%   BMI 22.50 kg/m²     Physical Examination:    Constitutional Normal - Mood and affect appropriate. Appears close to chronological age. Well nourished.  Well d Latest Ref Range: 0.1 - 2.0 mg/dL 2.3 (H)   Globulin Latest Ref Range: 2.8 - 4.4 g/dL 4.4   A/G Ratio Latest Ref Range: 1.0 - 2.0  0.8 (L)   TOTAL PROTEIN Latest Ref Range: 6.4 - 8.2 g/dL 8.1   Albumin Latest Ref Range: 3.4 - 5.0 g/dL 3.7   AFP TUMOR MARKE min  Post-visit charting: N/A  Total time on the day of service: 20 min      Electronically Signed by: Ana Shea M.D.   [address] [facility]

## 2021-08-30 ENCOUNTER — OFFICE VISIT (OUTPATIENT)
Dept: SURGERY | Facility: CLINIC | Age: 71
End: 2021-08-30
Payer: MEDICARE

## 2021-08-30 VITALS
HEIGHT: 60.98 IN | WEIGHT: 116.19 LBS | OXYGEN SATURATION: 96 % | TEMPERATURE: 98 F | RESPIRATION RATE: 16 BRPM | DIASTOLIC BLOOD PRESSURE: 74 MMHG | BODY MASS INDEX: 21.94 KG/M2 | HEART RATE: 56 BPM | SYSTOLIC BLOOD PRESSURE: 138 MMHG

## 2021-08-30 DIAGNOSIS — K75.4 AUTOIMMUNE HEPATITIS (HCC): Primary | ICD-10-CM

## 2021-08-30 RX ORDER — AZATHIOPRINE 50 MG/1
25 TABLET ORAL DAILY
Qty: 45 TABLET | Refills: 3 | Status: SHIPPED | OUTPATIENT
Start: 2021-08-30

## 2021-08-30 NOTE — PROGRESS NOTES
[facility] at [facility]  [address] [facility] [address] [facility], [address]  [address]  [phone] ([phone]) visit  - Taking ursodiol  - Taking AZA 25 mg daily   - MRI July '21 with no masses, no ascites  - no swelling  - No jaundice  - No confusion  - Labs stable       Past Medical History:   Diagnosis Date   • Abnormal TSH     \"borderline\"   • Arthritis    • mouth daily.  45 tablet 5   • LEVOTHYROXINE SODIUM 25 MCG Oral Tab TAKE 1 TABLET(25 MCG) BY MOUTH BEFORE BREAKFAST 90 tablet 0   • PROPRANOLOL HCL 20 MG Oral Tab TAKE 1 TABLET(20 MG) BY MOUTH TWICE DAILY 180 tablet 0   • Cholecalciferol (VITAMIN D-3 OR) Efrain Ledesma 138/74 (BP Location: Left arm, Patient Position: Sitting, Cuff Size: adult)   Pulse 56   Temp 97.5 °F (36.4 °C)   Resp 16   Ht 1.549 m (5' 0.98\")   Wt 52.7 kg (116 lb 3.2 oz)   LMP  (LMP Unknown)   SpO2 96%   BMI 21.97 kg/m²   Gen: NAD  HEENT: Anicteric

## 2021-09-02 ENCOUNTER — TELEPHONE (OUTPATIENT)
Dept: PODIATRY CLINIC | Facility: CLINIC | Age: 71
End: 2021-09-02

## 2021-09-02 DIAGNOSIS — B35.1 ONYCHOMYCOSIS: Primary | ICD-10-CM

## 2021-09-02 NOTE — TELEPHONE ENCOUNTER
Spoke to patient and relayed Dr. Krissy Melissa message as shown below.  Patient verbalized understanding and states has an upcoming appointment with Dr. Nick Ford next week and would like to discuss options with him at that visit as she has an autoimmune liver dis

## 2021-09-02 NOTE — TELEPHONE ENCOUNTER
----- Message from Delmis Archibald DPM sent at 8/31/2021  3:48 PM CDT -----  Results reviewed. Please inform patient that fungal culture results are positive and we should proceed with Lamisil tablet therapy.   If the patient agrees we have to do a hepa

## 2021-09-08 ENCOUNTER — HOSPITAL ENCOUNTER (EMERGENCY)
Facility: HOSPITAL | Age: 71
Discharge: HOME | End: 2021-09-08
Attending: EMERGENCY MEDICINE
Payer: MEDICARE

## 2021-09-08 VITALS
DIASTOLIC BLOOD PRESSURE: 59 MMHG | OXYGEN SATURATION: 100 % | HEIGHT: 61 IN | WEIGHT: 116 LBS | HEART RATE: 54 BPM | BODY MASS INDEX: 21.9 KG/M2 | RESPIRATION RATE: 16 BRPM | TEMPERATURE: 99 F | SYSTOLIC BLOOD PRESSURE: 160 MMHG

## 2021-09-08 DIAGNOSIS — I83.91 VARICOSE VEINS OF RIGHT LOWER EXTREMITY, UNSPECIFIED WHETHER COMPLICATED: Primary | ICD-10-CM

## 2021-09-08 LAB
BASOPHILS # BLD AUTO: 0.03 X10(3) UL (ref 0–0.2)
BASOPHILS NFR BLD AUTO: 0.7 %
EOSINOPHIL # BLD AUTO: 0.12 X10(3) UL (ref 0–0.7)
EOSINOPHIL NFR BLD AUTO: 2.9 %
ERYTHROCYTE [DISTWIDTH] IN BLOOD BY AUTOMATED COUNT: 12.5 %
HCT VFR BLD AUTO: 43.3 %
HGB BLD-MCNC: 15.1 G/DL
IMM GRANULOCYTES # BLD AUTO: 0.01 X10(3) UL (ref 0–1)
IMM GRANULOCYTES NFR BLD: 0.2 %
LYMPHOCYTES # BLD AUTO: 0.79 X10(3) UL (ref 1–4)
LYMPHOCYTES NFR BLD AUTO: 19.4 %
MCH RBC QN AUTO: 35.3 PG (ref 26–34)
MCHC RBC AUTO-ENTMCNC: 34.9 G/DL (ref 31–37)
MCV RBC AUTO: 101.2 FL
MONOCYTES # BLD AUTO: 0.28 X10(3) UL (ref 0.1–1)
MONOCYTES NFR BLD AUTO: 6.9 %
NEUTROPHILS # BLD AUTO: 2.84 X10 (3) UL (ref 1.5–7.7)
NEUTROPHILS # BLD AUTO: 2.84 X10(3) UL (ref 1.5–7.7)
NEUTROPHILS NFR BLD AUTO: 69.9 %
PLATELET # BLD AUTO: 129 10(3)UL (ref 150–450)
RBC # BLD AUTO: 4.28 X10(6)UL
WBC # BLD AUTO: 4.1 X10(3) UL (ref 4–11)

## 2021-09-08 PROCEDURE — 85025 COMPLETE CBC W/AUTO DIFF WBC: CPT | Performed by: EMERGENCY MEDICINE

## 2021-09-08 PROCEDURE — 99283 EMERGENCY DEPT VISIT LOW MDM: CPT | Performed by: EMERGENCY MEDICINE

## 2021-09-08 PROCEDURE — 36415 COLL VENOUS BLD VENIPUNCTURE: CPT | Performed by: EMERGENCY MEDICINE

## 2021-09-08 NOTE — ED INITIAL ASSESSMENT (HPI)
To the ED with c/o bleeding from varicose vein behind R knee. States was walking dogs when noticed dripping to this area. No injury. Bleeding controlled at present- gauze and kerlex dressing applied and + CMS below level.  Hx of liver issues

## 2021-09-09 NOTE — ED PROVIDER NOTES
Patient Seen in: [facility] Emergency Department      History   Patient presents with:  Bleeding    Stated Complaint: varicose vein that started to bleed on the back of her leg    HPI/Subjective:    HPI    Patient has a history of autoimmune hepatit status: Former Smoker        Packs/day: 0.25        Years: 2.00        Pack years: .5        Types: Cigarettes      Smokeless tobacco: Never Used      Tobacco comment: Very light smoker briefly in my 19's & late 29's    Vaping Use      Vaping Use: Never us Value    .0 (*)     .2 (*)     MCH 35.3 (*)     Lymphocyte Absolute 0.79 (*)     All other components within normal limits   CBC WITH DIFFERENTIAL WITH PLATELET    Narrative:      The following orders were created for panel order CBC With [facility]

## 2021-09-14 ENCOUNTER — OFFICE VISIT (OUTPATIENT)
Dept: PODIATRY CLINIC | Facility: CLINIC | Age: 71
End: 2021-09-14
Payer: MEDICARE

## 2021-09-14 DIAGNOSIS — B35.1 ONYCHOMYCOSIS: Primary | ICD-10-CM

## 2021-09-14 DIAGNOSIS — M20.5X2 HALLUX LIMITUS, LEFT: ICD-10-CM

## 2021-09-14 DIAGNOSIS — M77.50 CAPSULITIS OF METATARSOPHALANGEAL (MTP) JOINT: ICD-10-CM

## 2021-09-14 PROCEDURE — 99213 OFFICE O/P EST LOW 20 MIN: CPT | Performed by: PODIATRIST

## 2021-09-14 NOTE — PROGRESS NOTES
Novella Curling is a 66year old female. Patient presents with:   Foot Pain: Pt here for f/u on injection to Left foot, reports intermittent pain, but improved overall  Toenail Fungus: Pt here to discuss results        HPI:   Patient returns to the clinic • Abnormal TSH     \"borderline\"   • Arthritis    • Arthritis     hands   • Back pain    • Bad breath    • Blood in urine    • Cholelithiasis    • Clostridium difficile diarrhea 2013?    • Colitis    • Dysuria    • Easy bruising    • Fatigue     Occasion late 30's    Vaping Use      Vaping Use: Never used    Substance and Sexual Activity      Alcohol use: Not Currently        Comment: Rare--1-2 drinks a year.       Drug use: No      Sexual activity: Never    Other Topics      Concerns:        Caffeine Livia patient indicates understanding of these issues and agrees to the plan.     Vic Maynard DPM

## 2021-09-17 RX ORDER — PROPRANOLOL HYDROCHLORIDE 20 MG/1
TABLET ORAL
Qty: 180 TABLET | Refills: 0 | Status: SHIPPED | OUTPATIENT
Start: 2021-09-17 | End: 2022-01-03

## 2021-09-17 NOTE — TELEPHONE ENCOUNTER
Last VISIT - 1/14/21    Last CPE - 9/9/20 Wellness visit    Last REFILL -     PROPRANOLOL HCL 20 MG Oral Tab 180 tablet 0 6/7/2021     Last LABS - 7/29/21 cmp, cbc    Future Appointments   Date Time Provider Maynor Hinson   10/1/2021  1:40 PM No Medina,

## 2021-09-22 ENCOUNTER — ORDER TRANSCRIPTION (OUTPATIENT)
Dept: ADMINISTRATIVE | Facility: HOSPITAL | Age: 71
End: 2021-09-22

## 2021-09-22 DIAGNOSIS — Z12.31 ENCOUNTER FOR SCREENING MAMMOGRAM FOR MALIGNANT NEOPLASM OF BREAST: Primary | ICD-10-CM

## 2021-09-24 ENCOUNTER — LAB ENCOUNTER (OUTPATIENT)
Dept: LAB | Facility: HOSPITAL | Age: 71
End: 2021-09-24
Attending: INTERNAL MEDICINE
Payer: MEDICARE

## 2021-09-24 DIAGNOSIS — Z00.00 ROUTINE GENERAL MEDICAL EXAMINATION AT A HEALTH CARE FACILITY: ICD-10-CM

## 2021-09-24 DIAGNOSIS — E03.8 SUBCLINICAL HYPOTHYROIDISM: ICD-10-CM

## 2021-09-24 DIAGNOSIS — I10 ESSENTIAL HYPERTENSION: ICD-10-CM

## 2021-09-24 LAB
CHOLEST SERPL-MCNC: 155 MG/DL (ref ?–200)
HDLC SERPL-MCNC: 67 MG/DL (ref 40–59)
LDLC SERPL CALC-MCNC: 73 MG/DL (ref ?–100)
NONHDLC SERPL-MCNC: 88 MG/DL (ref ?–130)
PATIENT FASTING Y/N/NP: YES
T4 FREE SERPL-MCNC: 1 NG/DL (ref 0.8–1.7)
TRIGL SERPL-MCNC: 77 MG/DL (ref 30–149)
TSI SER-ACNC: 3.8 MIU/ML (ref 0.36–3.74)
VLDLC SERPL CALC-MCNC: 12 MG/DL (ref 0–30)

## 2021-09-24 PROCEDURE — 36415 COLL VENOUS BLD VENIPUNCTURE: CPT

## 2021-09-24 PROCEDURE — 84439 ASSAY OF FREE THYROXINE: CPT

## 2021-09-24 PROCEDURE — 80061 LIPID PANEL: CPT

## 2021-09-24 PROCEDURE — 84443 ASSAY THYROID STIM HORMONE: CPT

## 2021-09-26 DIAGNOSIS — E03.8 SUBCLINICAL HYPOTHYROIDISM: ICD-10-CM

## 2021-09-28 ENCOUNTER — TELEPHONE (OUTPATIENT)
Dept: PODIATRY CLINIC | Facility: CLINIC | Age: 71
End: 2021-09-28

## 2021-09-28 DIAGNOSIS — B35.1 ONYCHOMYCOSIS: Primary | ICD-10-CM

## 2021-09-28 RX ORDER — LEVOTHYROXINE SODIUM 0.03 MG/1
TABLET ORAL
Qty: 90 TABLET | Refills: 0 | Status: SHIPPED | OUTPATIENT
Start: 2021-09-28 | End: 2022-01-04

## 2021-09-28 NOTE — TELEPHONE ENCOUNTER
----- Message from Lorraine Sarabia DPM sent at 9/23/2021  3:22 PM CDT -----  Results reviewed. Please inform patient that fungal culture results are positive and we should proceed with Lamisil tablet therapy. If the patient agrees we have to do a hepatic function panel, please place that order for me with a diagnosis of onychomycosis.

## 2021-10-01 ENCOUNTER — OFFICE VISIT (OUTPATIENT)
Dept: INTERNAL MEDICINE CLINIC | Facility: CLINIC | Age: 71
End: 2021-10-01
Payer: MEDICARE

## 2021-10-01 VITALS — OXYGEN SATURATION: 98 % | HEART RATE: 57 BPM | SYSTOLIC BLOOD PRESSURE: 132 MMHG | DIASTOLIC BLOOD PRESSURE: 70 MMHG

## 2021-10-01 DIAGNOSIS — H53.9 VISION CHANGES: ICD-10-CM

## 2021-10-01 DIAGNOSIS — Z78.0 POST-MENOPAUSAL: ICD-10-CM

## 2021-10-01 DIAGNOSIS — K74.3 PRIMARY BILIARY CIRRHOSIS (HCC): ICD-10-CM

## 2021-10-01 DIAGNOSIS — Z23 NEED FOR INFLUENZA VACCINATION: ICD-10-CM

## 2021-10-01 DIAGNOSIS — Z00.00 ENCOUNTER FOR ANNUAL HEALTH EXAMINATION: Primary | ICD-10-CM

## 2021-10-01 DIAGNOSIS — E03.8 OTHER SPECIFIED HYPOTHYROIDISM: ICD-10-CM

## 2021-10-01 DIAGNOSIS — I10 ESSENTIAL HYPERTENSION: ICD-10-CM

## 2021-10-01 DIAGNOSIS — M81.0 SENILE OSTEOPOROSIS: ICD-10-CM

## 2021-10-01 DIAGNOSIS — I85.00 ESOPHAGEAL VARICES WITHOUT BLEEDING, UNSPECIFIED ESOPHAGEAL VARICES TYPE (HCC): ICD-10-CM

## 2021-10-01 PROCEDURE — 90662 IIV NO PRSV INCREASED AG IM: CPT | Performed by: INTERNAL MEDICINE

## 2021-10-01 PROCEDURE — G0439 PPPS, SUBSEQ VISIT: HCPCS | Performed by: INTERNAL MEDICINE

## 2021-10-01 PROCEDURE — G0008 ADMIN INFLUENZA VIRUS VAC: HCPCS | Performed by: INTERNAL MEDICINE

## 2021-10-25 ENCOUNTER — LAB ENCOUNTER (OUTPATIENT)
Dept: LAB | Age: 71
End: 2021-10-25
Attending: STUDENT IN AN ORGANIZED HEALTH CARE EDUCATION/TRAINING PROGRAM
Payer: MEDICARE

## 2021-10-25 DIAGNOSIS — K74.3 CHOLANGITIC CIRRHOSIS (HCC): ICD-10-CM

## 2021-10-28 ENCOUNTER — ANESTHESIA EVENT (OUTPATIENT)
Dept: ENDOSCOPY | Facility: HOSPITAL | Age: 71
End: 2021-10-28
Payer: MEDICARE

## 2021-10-28 ENCOUNTER — HOSPITAL ENCOUNTER (OUTPATIENT)
Facility: HOSPITAL | Age: 71
Setting detail: HOSPITAL OUTPATIENT SURGERY
Discharge: HOME | End: 2021-10-28
Attending: STUDENT IN AN ORGANIZED HEALTH CARE EDUCATION/TRAINING PROGRAM | Admitting: STUDENT IN AN ORGANIZED HEALTH CARE EDUCATION/TRAINING PROGRAM
Payer: MEDICARE

## 2021-10-28 ENCOUNTER — ANESTHESIA (OUTPATIENT)
Dept: ENDOSCOPY | Facility: HOSPITAL | Age: 71
End: 2021-10-28
Payer: MEDICARE

## 2021-10-28 VITALS
HEIGHT: 61 IN | DIASTOLIC BLOOD PRESSURE: 56 MMHG | OXYGEN SATURATION: 99 % | SYSTOLIC BLOOD PRESSURE: 123 MMHG | TEMPERATURE: 98 F | RESPIRATION RATE: 16 BRPM | HEART RATE: 55 BPM | BODY MASS INDEX: 21.71 KG/M2 | WEIGHT: 115 LBS

## 2021-10-28 DIAGNOSIS — K74.3 CHOLANGITIC CIRRHOSIS (HCC): Primary | ICD-10-CM

## 2021-10-28 PROCEDURE — 88305 TISSUE EXAM BY PATHOLOGIST: CPT | Performed by: STUDENT IN AN ORGANIZED HEALTH CARE EDUCATION/TRAINING PROGRAM

## 2021-10-28 PROCEDURE — 0DB48ZX EXCISION OF ESOPHAGOGASTRIC JUNCTION, VIA NATURAL OR ARTIFICIAL OPENING ENDOSCOPIC, DIAGNOSTIC: ICD-10-PCS | Performed by: STUDENT IN AN ORGANIZED HEALTH CARE EDUCATION/TRAINING PROGRAM

## 2021-10-28 PROCEDURE — 0DB68ZX EXCISION OF STOMACH, VIA NATURAL OR ARTIFICIAL OPENING ENDOSCOPIC, DIAGNOSTIC: ICD-10-PCS | Performed by: STUDENT IN AN ORGANIZED HEALTH CARE EDUCATION/TRAINING PROGRAM

## 2021-10-28 RX ORDER — SODIUM CHLORIDE, SODIUM LACTATE, POTASSIUM CHLORIDE, CALCIUM CHLORIDE 600; 310; 30; 20 MG/100ML; MG/100ML; MG/100ML; MG/100ML
INJECTION, SOLUTION INTRAVENOUS CONTINUOUS
Status: DISCONTINUED | OUTPATIENT
Start: 2021-10-28 | End: 2021-10-28

## 2021-10-28 RX ORDER — NALOXONE HYDROCHLORIDE 0.4 MG/ML
80 INJECTION, SOLUTION INTRAMUSCULAR; INTRAVENOUS; SUBCUTANEOUS AS NEEDED
Status: DISCONTINUED | OUTPATIENT
Start: 2021-10-28 | End: 2021-10-28

## 2021-10-28 RX ADMIN — SODIUM CHLORIDE, SODIUM LACTATE, POTASSIUM CHLORIDE, CALCIUM CHLORIDE: 600; 310; 30; 20 INJECTION, SOLUTION INTRAVENOUS at 09:40:00

## 2021-10-28 RX ADMIN — SODIUM CHLORIDE, SODIUM LACTATE, POTASSIUM CHLORIDE, CALCIUM CHLORIDE: 600; 310; 30; 20 INJECTION, SOLUTION INTRAVENOUS at 09:53:00

## 2021-10-28 NOTE — H&P
Gastroenterology H/P  I have personally seen and examined the patient. 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 Last exam was 3 yrs ago. malignancies; No family history of ulcer disease, or inflammatory bowel disease  ROS:  In addition to the pertinent positives described above:             Infectious Disease:  No chronic infections or recent fevers prior to the acute illness            Car hepatosplenomegaly; no rebound or guarding;  No ascites is clinically apparent; no tympany to percussion  Ext: No peripheral edema or cyanosis  Skin: Warm and dry  Psychiatric: Appropriate mood and congruent affect without obvious depression or anxiety    L

## 2021-10-28 NOTE — ANESTHESIA POSTPROCEDURE EVALUATION
[address] [facility],[address] A Stump Patient Status:  Hospital Outpatient Surgery   Age/Gender 66year old female MRN [id number]   Location [id number] [place] [address] Attending Mettu, Ed Quinonez MD   [facility]

## 2021-10-28 NOTE — ANESTHESIA PREPROCEDURE EVALUATION
PRE-OP EVALUATION    Patient Name: Peter MARIO Alban    Admit Diagnosis: Cholangitic cirrhosis ([facility] [address] [K74.3]    Pre-op Diagnosis: Cholangitic cirrhosis ([facility] [address] [K74.3]    ESOPHAGOGASTRODUODENOSCOPY (EGD)    Anesthesia Procedure: ESOPHAGOGASTRODUODENOSCOPY (EG Ciprofloxacin      Anesthesia Evaluation    Patient summary reviewed.     Anesthetic Complications  (-) history of anesthetic complications         GI/Hepatic/Renal                (+) liver disease (PBC)                 Cardiovascular        Exercise tolera patient                Present on Admission:  **None**

## 2021-10-28 NOTE — OPERATIVE REPORT
aEGD operative report  Patient Name: Collin Dolan Stump  Procedure: Esophagogastroduodenoscopy with biopsy   Indication: screen for varices  Attending: Elizabeth Arrieta M.D.   Consent:  The risks, benefits, and alternatives were discussed with the patient / Jonathan Coreas 1. Await pathology. 2. Start omeprazole 20 mg tab once daily, 30 min prior to breakfast.  3. Repeat EGD in 3 years. 4. Follow-up in 3/2022.     Elda Gutierres MD

## 2021-12-04 ENCOUNTER — LAB ENCOUNTER (OUTPATIENT)
Dept: LAB | Facility: HOSPITAL | Age: 71
End: 2021-12-04
Attending: INTERNAL MEDICINE
Payer: MEDICARE

## 2021-12-04 DIAGNOSIS — K75.4 AUTOIMMUNE HEPATITIS (HCC): ICD-10-CM

## 2021-12-04 PROCEDURE — 85025 COMPLETE CBC W/AUTO DIFF WBC: CPT

## 2021-12-04 PROCEDURE — 36415 COLL VENOUS BLD VENIPUNCTURE: CPT

## 2021-12-04 PROCEDURE — 80053 COMPREHEN METABOLIC PANEL: CPT

## 2021-12-04 PROCEDURE — 82550 ASSAY OF CK (CPK): CPT

## 2021-12-14 ENCOUNTER — TELEPHONE (OUTPATIENT)
Dept: INTERNAL MEDICINE CLINIC | Facility: CLINIC | Age: 71
End: 2021-12-14

## 2021-12-14 NOTE — TELEPHONE ENCOUNTER
Spoke with pt. Pt stated that she has been having UTI sx for past couple of days. Had a fever of 100.8 last night. Pt said that she has a hx of UTIs. Also having pain in L groin area. Advised pt to be seen in [facility] for evaluation/complete a UA.  Pt sta

## 2021-12-14 NOTE — TELEPHONE ENCOUNTER
Pt calling asking for urine culture to be ordered for her. She has back pain, groin pain, fever, chills, has frequent UTI's. Would like to speak with nurse.

## 2021-12-15 ENCOUNTER — HOSPITAL ENCOUNTER (OUTPATIENT)
Age: 71
Discharge: HOME | End: 2021-12-15
Payer: MEDICARE

## 2021-12-15 VITALS
DIASTOLIC BLOOD PRESSURE: 54 MMHG | TEMPERATURE: 98 F | OXYGEN SATURATION: 99 % | HEART RATE: 88 BPM | SYSTOLIC BLOOD PRESSURE: 120 MMHG | RESPIRATION RATE: 20 BRPM

## 2021-12-15 DIAGNOSIS — K75.4 AUTOIMMUNE HEPATITIS (HCC): ICD-10-CM

## 2021-12-15 DIAGNOSIS — N39.0 ACUTE URINARY TRACT INFECTION: Primary | ICD-10-CM

## 2021-12-15 PROCEDURE — 99214 OFFICE O/P EST MOD 30 MIN: CPT

## 2021-12-15 PROCEDURE — 87086 URINE CULTURE/COLONY COUNT: CPT | Performed by: NURSE PRACTITIONER

## 2021-12-15 PROCEDURE — 87186 SC STD MICRODIL/AGAR DIL: CPT | Performed by: NURSE PRACTITIONER

## 2021-12-15 PROCEDURE — 87088 URINE BACTERIA CULTURE: CPT | Performed by: NURSE PRACTITIONER

## 2021-12-15 PROCEDURE — 81002 URINALYSIS NONAUTO W/O SCOPE: CPT | Performed by: NURSE PRACTITIONER

## 2021-12-15 RX ORDER — SULFAMETHOXAZOLE AND TRIMETHOPRIM 800; 160 MG/1; MG/1
1 TABLET ORAL 2 TIMES DAILY
Qty: 14 TABLET | Refills: 0 | Status: SHIPPED | OUTPATIENT
Start: 2021-12-15 | End: 2021-12-22

## 2021-12-15 NOTE — ED INITIAL ASSESSMENT (HPI)
Patient c/o lower abdominal pressure, chills, and low grade temperature X 3 days. Noticed foul odor to urine. No hematuria. Now c/o ongoing chills and right flank pain.

## 2021-12-15 NOTE — ED PROVIDER NOTES
Patient Seen in: [facility]      History   Patient presents with:  Urinary Symptoms    Stated Complaint: kidney infection / fever x  3 days / chills    Subjective:   66-year-old female presents today urinary symptoms suprapubic pain and n Neurological:      Mental Status: She is alert and oriented to person, place, and time.                ED Course     Labs Reviewed   POCT URINALYSIS DIPSTICK - Abnormal; Notable for the following components:       Result Value    Urine Color Orange (*)

## 2021-12-20 ENCOUNTER — HOSPITAL ENCOUNTER (EMERGENCY)
Facility: HOSPITAL | Age: 71
Discharge: HOME | End: 2021-12-21
Attending: EMERGENCY MEDICINE
Payer: MEDICARE

## 2021-12-20 VITALS
OXYGEN SATURATION: 98 % | SYSTOLIC BLOOD PRESSURE: 164 MMHG | WEIGHT: 115 LBS | BODY MASS INDEX: 21.71 KG/M2 | TEMPERATURE: 98 F | RESPIRATION RATE: 20 BRPM | HEART RATE: 69 BPM | DIASTOLIC BLOOD PRESSURE: 70 MMHG | HEIGHT: 61 IN

## 2021-12-20 DIAGNOSIS — R53.1 WEAKNESS GENERALIZED: Primary | ICD-10-CM

## 2021-12-20 PROCEDURE — 96361 HYDRATE IV INFUSION ADD-ON: CPT

## 2021-12-20 PROCEDURE — 99284 EMERGENCY DEPT VISIT MOD MDM: CPT

## 2021-12-20 PROCEDURE — 81001 URINALYSIS AUTO W/SCOPE: CPT

## 2021-12-20 PROCEDURE — 80053 COMPREHEN METABOLIC PANEL: CPT | Performed by: EMERGENCY MEDICINE

## 2021-12-20 PROCEDURE — 96374 THER/PROPH/DIAG INJ IV PUSH: CPT

## 2021-12-20 PROCEDURE — 85025 COMPLETE CBC W/AUTO DIFF WBC: CPT | Performed by: EMERGENCY MEDICINE

## 2021-12-20 PROCEDURE — 81001 URINALYSIS AUTO W/SCOPE: CPT | Performed by: EMERGENCY MEDICINE

## 2021-12-20 PROCEDURE — 87086 URINE CULTURE/COLONY COUNT: CPT | Performed by: EMERGENCY MEDICINE

## 2021-12-21 RX ORDER — KETOROLAC TROMETHAMINE 30 MG/ML
15 INJECTION, SOLUTION INTRAMUSCULAR; INTRAVENOUS ONCE
Status: COMPLETED | OUTPATIENT
Start: 2021-12-21 | End: 2021-12-21

## 2021-12-21 RX ORDER — ONDANSETRON 4 MG/1
4 TABLET, ORALLY DISINTEGRATING ORAL EVERY 4 HOURS PRN
Qty: 10 TABLET | Refills: 0 | Status: SHIPPED | OUTPATIENT
Start: 2021-12-21 | End: 2021-12-28

## 2021-12-21 NOTE — ED INITIAL ASSESSMENT (HPI)
Patient states recent kidney infection, started keflex. C/o weakness, shakiness, decreased appetite, nausea, \"heads not clear\" and \"felt like I was going to pass out. \" Denies fever, urinary symptoms.

## 2021-12-21 NOTE — ED PROVIDER NOTES
Patient Seen in: [facility] Emergency Department      History   Patient presents with:  Urinary Symptoms    Stated Complaint: urinary symptoms, weakness, no fever    Subjective:   HPI    70-year-old female presents reporting general fatigue.   She wa ANGIOGRAM     • COLONOSCOPY  02/16/2010   • COLONOSCOPY     • COLONOSCOPY N/A 7/31/2018    Procedure: COLONOSCOPY;  Surgeon: Suzette Lamb MD;  Location: [address] [place] [address] ENDOSCOPY   • EGD     • NEEDLE BIOPSY LIVER     • [address] [place] [facility].  Cells Few (*)     All other components within normal limits   COMP METABOLIC PANEL (14) - Abnormal; Notable for the following components:    Glucose 103 (*)     Creatinine 1.16 (*)     GFR, Non- 47 (*)     GFR, -Lola Isela 82  [phone]    Schedule an appointment as soon as possible for a visit            Medications Prescribed:  Current Discharge Medication List    START taking these medications    ondansetron 4 MG Oral Tablet Dis

## 2022-01-03 RX ORDER — PROPRANOLOL HYDROCHLORIDE 20 MG/1
TABLET ORAL
Qty: 180 TABLET | Refills: 0 | Status: SHIPPED | OUTPATIENT
Start: 2022-01-03

## 2022-01-04 DIAGNOSIS — E03.8 SUBCLINICAL HYPOTHYROIDISM: ICD-10-CM

## 2022-01-04 RX ORDER — LEVOTHYROXINE SODIUM 0.03 MG/1
TABLET ORAL
Qty: 90 TABLET | Refills: 0 | Status: SHIPPED | OUTPATIENT
Start: 2022-01-04

## 2022-02-14 ENCOUNTER — HOSPITAL ENCOUNTER (OUTPATIENT)
Dept: MRI IMAGING | Facility: HOSPITAL | Age: 72
Discharge: HOME | End: 2022-02-14
Attending: STUDENT IN AN ORGANIZED HEALTH CARE EDUCATION/TRAINING PROGRAM
Payer: MEDICARE

## 2022-02-14 DIAGNOSIS — K74.3 PRIMARY BILIARY CIRRHOSIS (HCC): ICD-10-CM

## 2022-02-14 LAB — CREAT BLD-MCNC: 1 MG/DL

## 2022-02-14 PROCEDURE — 82565 ASSAY OF CREATININE: CPT

## 2022-02-14 PROCEDURE — 74183 MRI ABD W/O CNTR FLWD CNTR: CPT | Performed by: STUDENT IN AN ORGANIZED HEALTH CARE EDUCATION/TRAINING PROGRAM

## 2022-02-14 PROCEDURE — A9581 GADOXETATE DISODIUM INJ: HCPCS | Performed by: STUDENT IN AN ORGANIZED HEALTH CARE EDUCATION/TRAINING PROGRAM

## 2022-02-16 ENCOUNTER — PATIENT MESSAGE (OUTPATIENT)
Dept: HEMATOLOGY/ONCOLOGY | Facility: HOSPITAL | Age: 72
End: 2022-02-16

## 2022-02-18 ENCOUNTER — APPOINTMENT (OUTPATIENT)
Dept: HEMATOLOGY/ONCOLOGY | Facility: HOSPITAL | Age: 72
End: 2022-02-18
Attending: INTERNAL MEDICINE
Payer: MEDICARE

## 2022-02-18 ENCOUNTER — LAB ENCOUNTER (OUTPATIENT)
Dept: LAB | Facility: HOSPITAL | Age: 72
End: 2022-02-18
Attending: INTERNAL MEDICINE
Payer: MEDICARE

## 2022-02-18 DIAGNOSIS — K74.60 CIRRHOSIS, NON-ALCOHOLIC (HCC): ICD-10-CM

## 2022-02-18 DIAGNOSIS — D72.819 LEUKOPENIA, UNSPECIFIED TYPE: ICD-10-CM

## 2022-02-18 DIAGNOSIS — D69.6 THROMBOCYTOPENIA (HCC): ICD-10-CM

## 2022-02-18 DIAGNOSIS — E03.8 OTHER SPECIFIED HYPOTHYROIDISM: ICD-10-CM

## 2022-02-18 DIAGNOSIS — K74.3 PRIMARY BILIARY CIRRHOSIS (HCC): ICD-10-CM

## 2022-02-18 LAB
AFP-TM SERPL-MCNC: 8.1 NG/ML (ref ?–8)
ALBUMIN SERPL-MCNC: 3.5 G/DL (ref 3.4–5)
ALBUMIN/GLOB SERPL: 0.9 {RATIO} (ref 1–2)
ALP LIVER SERPL-CCNC: 95 U/L
ALT SERPL-CCNC: 18 U/L
ANION GAP SERPL CALC-SCNC: 3 MMOL/L (ref 0–18)
AST SERPL-CCNC: 37 U/L (ref 15–37)
BASOPHILS # BLD AUTO: 0.02 X10(3) UL (ref 0–0.2)
BASOPHILS NFR BLD AUTO: 1.1 %
BILIRUB SERPL-MCNC: 1.6 MG/DL (ref 0.1–2)
BUN BLD-MCNC: 14 MG/DL (ref 7–18)
CALCIUM BLD-MCNC: 9.4 MG/DL (ref 8.5–10.1)
CHLORIDE SERPL-SCNC: 108 MMOL/L (ref 98–112)
CO2 SERPL-SCNC: 29 MMOL/L (ref 21–32)
CREAT BLD-MCNC: 0.93 MG/DL
EOSINOPHIL # BLD AUTO: 0.1 X10(3) UL (ref 0–0.7)
EOSINOPHIL NFR BLD AUTO: 5.3 %
ERYTHROCYTE [DISTWIDTH] IN BLOOD BY AUTOMATED COUNT: 12.3 %
FASTING STATUS PATIENT QL REPORTED: YES
GLOBULIN PLAS-MCNC: 4.1 G/DL (ref 2.8–4.4)
GLUCOSE BLD-MCNC: 89 MG/DL (ref 70–99)
HCT VFR BLD AUTO: 39.1 %
HGB BLD-MCNC: 13 G/DL
IMM GRANULOCYTES # BLD AUTO: 0.01 X10(3) UL (ref 0–1)
IMM GRANULOCYTES NFR BLD: 0.5 %
LYMPHOCYTES # BLD AUTO: 0.44 X10(3) UL (ref 1–4)
LYMPHOCYTES NFR BLD AUTO: 23.5 %
MCH RBC QN AUTO: 34.1 PG (ref 26–34)
MCHC RBC AUTO-ENTMCNC: 33.2 G/DL (ref 31–37)
MCV RBC AUTO: 102.6 FL
MONOCYTES # BLD AUTO: 0.19 X10(3) UL (ref 0.1–1)
MONOCYTES NFR BLD AUTO: 10.2 %
NEUTROPHILS # BLD AUTO: 1.11 X10 (3) UL (ref 1.5–7.7)
NEUTROPHILS # BLD AUTO: 1.11 X10(3) UL (ref 1.5–7.7)
NEUTROPHILS NFR BLD AUTO: 59.4 %
OSMOLALITY SERPL CALC.SUM OF ELEC: 290 MOSM/KG (ref 275–295)
PLATELET # BLD AUTO: 107 10(3)UL (ref 150–450)
POTASSIUM SERPL-SCNC: 4 MMOL/L (ref 3.5–5.1)
PROT SERPL-MCNC: 7.6 G/DL (ref 6.4–8.2)
RBC # BLD AUTO: 3.81 X10(6)UL
SODIUM SERPL-SCNC: 140 MMOL/L (ref 136–145)
T4 FREE SERPL-MCNC: 1.1 NG/DL (ref 0.8–1.7)
TSI SER-ACNC: 2.48 MIU/ML (ref 0.36–3.74)
WBC # BLD AUTO: 1.9 X10(3) UL (ref 4–11)

## 2022-02-18 PROCEDURE — 36415 COLL VENOUS BLD VENIPUNCTURE: CPT

## 2022-02-18 PROCEDURE — 82105 ALPHA-FETOPROTEIN SERUM: CPT

## 2022-02-18 PROCEDURE — 84439 ASSAY OF FREE THYROXINE: CPT

## 2022-02-18 PROCEDURE — 80053 COMPREHEN METABOLIC PANEL: CPT

## 2022-02-18 PROCEDURE — 85025 COMPLETE CBC W/AUTO DIFF WBC: CPT

## 2022-02-18 PROCEDURE — 84443 ASSAY THYROID STIM HORMONE: CPT

## 2022-02-23 NOTE — PROGRESS NOTES
Mamta,    Overall the labs all look stable. The tumor marker (AFP) is on the higher side, but I do not believe its any reason to be alarmed because the MRI looked fine and the elevation is similar to where it was 3 years ago. Since the most recently level is the highest its been in some time, we'll repeat the AFP again in 3 months.   Please call with any questions,    Mckay Brumfield MD

## 2022-02-25 ENCOUNTER — OFFICE VISIT (OUTPATIENT)
Dept: HEMATOLOGY/ONCOLOGY | Facility: HOSPITAL | Age: 72
End: 2022-02-25
Attending: INTERNAL MEDICINE
Payer: MEDICARE

## 2022-02-25 DIAGNOSIS — D69.6 THROMBOCYTOPENIA (HCC): ICD-10-CM

## 2022-02-25 DIAGNOSIS — D47.2 MGUS (MONOCLONAL GAMMOPATHY OF UNKNOWN SIGNIFICANCE): ICD-10-CM

## 2022-02-25 DIAGNOSIS — D72.819 LEUKOPENIA, UNSPECIFIED TYPE: Primary | ICD-10-CM

## 2022-02-25 LAB
BASOPHILS # BLD AUTO: 0.01 X10(3) UL (ref 0–0.2)
BASOPHILS NFR BLD AUTO: 0.5 %
EOSINOPHIL # BLD AUTO: 0.13 X10(3) UL (ref 0–0.7)
EOSINOPHIL NFR BLD AUTO: 6.8 %
ERYTHROCYTE [DISTWIDTH] IN BLOOD BY AUTOMATED COUNT: 12.4 %
FOLATE SERPL-MCNC: 16.9 NG/ML (ref 8.7–?)
HCT VFR BLD AUTO: 38.1 %
HGB BLD-MCNC: 13.2 G/DL
HGB RETIC QN AUTO: 40.6 PG (ref 28.2–36.6)
IMM GRANULOCYTES # BLD AUTO: 0.01 X10(3) UL (ref 0–1)
IMM GRANULOCYTES NFR BLD: 0.5 %
IMM RETICS NFR: 0.05 RATIO (ref 0.1–0.3)
LYMPHOCYTES # BLD AUTO: 0.47 X10(3) UL (ref 1–4)
LYMPHOCYTES NFR BLD AUTO: 24.5 %
MCH RBC QN AUTO: 35.4 PG (ref 26–34)
MCHC RBC AUTO-ENTMCNC: 34.6 G/DL (ref 31–37)
MCV RBC AUTO: 102.1 FL
MONOCYTES # BLD AUTO: 0.18 X10(3) UL (ref 0.1–1)
MONOCYTES NFR BLD AUTO: 9.4 %
NEUTROPHILS # BLD AUTO: 1.12 X10 (3) UL (ref 1.5–7.7)
NEUTROPHILS # BLD AUTO: 1.12 X10(3) UL (ref 1.5–7.7)
NEUTROPHILS NFR BLD AUTO: 58.3 %
PLATELET # BLD AUTO: 96 10(3)UL (ref 150–450)
RBC # BLD AUTO: 3.73 X10(6)UL
RETICS # AUTO: 49.8 X10(3) UL (ref 22.5–147.5)
RETICS/RBC NFR AUTO: 1.3 %
VIT B12 SERPL-MCNC: 907 PG/ML (ref 193–986)
WBC # BLD AUTO: 1.9 X10(3) UL (ref 4–11)

## 2022-02-25 PROCEDURE — 99213 OFFICE O/P EST LOW 20 MIN: CPT | Performed by: INTERNAL MEDICINE

## 2022-02-25 NOTE — PROGRESS NOTES
Education Record    Learner:  Patient    Disease / Acharya Allis labs     Barriers / Limitations:  None   Comments:    Method:  Discussion   Comments:    General Topics:  Plan of care reviewed   Comments:    Outcome:  Shows understanding   Comments:    Patient here for follow-up. Still having fatigue and nausea. Had EGD performed this past October.

## 2022-03-04 ENCOUNTER — OFFICE VISIT (OUTPATIENT)
Dept: OBGYN CLINIC | Facility: CLINIC | Age: 72
End: 2022-03-04
Payer: MEDICARE

## 2022-03-04 VITALS
DIASTOLIC BLOOD PRESSURE: 73 MMHG | BODY MASS INDEX: 20.01 KG/M2 | SYSTOLIC BLOOD PRESSURE: 183 MMHG | HEART RATE: 67 BPM | HEIGHT: 61 IN | WEIGHT: 106 LBS

## 2022-03-04 DIAGNOSIS — N95.0 POST-MENOPAUSAL BLEEDING: Primary | ICD-10-CM

## 2022-03-04 DIAGNOSIS — N36.9 URETHRAL LESION: ICD-10-CM

## 2022-03-04 PROCEDURE — 99203 OFFICE O/P NEW LOW 30 MIN: CPT | Performed by: OBSTETRICS & GYNECOLOGY

## 2022-03-04 NOTE — PROGRESS NOTES
Pt has been bleeding lightly x 7 days, No intercourse recently. Seems to be getting better today. LMP about 22 years .

## 2022-03-07 ENCOUNTER — ULTRASOUND ENCOUNTER (OUTPATIENT)
Dept: OBGYN CLINIC | Facility: CLINIC | Age: 72
End: 2022-03-07
Payer: MEDICARE

## 2022-03-07 LAB
ALBUMIN SERPL ELPH-MCNC: 3.9 G/DL (ref 3.75–5.21)
ALBUMIN/GLOB SERPL: 1.15 {RATIO} (ref 1–2)
ALPHA1 GLOB SERPL ELPH-MCNC: 0.23 G/DL (ref 0.19–0.46)
ALPHA2 GLOB SERPL ELPH-MCNC: 0.52 G/DL (ref 0.48–1.05)
B-GLOBULIN SERPL ELPH-MCNC: 0.84 G/DL (ref 0.68–1.23)
GAMMA GLOB SERPL ELPH-MCNC: 1.82 G/DL (ref 0.62–1.7)
KAPPA LC FREE SER-MCNC: 6.14 MG/DL (ref 0.33–1.94)
KAPPA LC FREE/LAMBDA FREE SER NEPH: 1.43 {RATIO} (ref 0.26–1.65)
LAMBDA LC FREE SERPL-MCNC: 4.3 MG/DL (ref 0.57–2.63)
PROT SERPL-MCNC: 7.3 G/DL (ref 6.4–8.2)

## 2022-04-11 RX ORDER — LEVOTHYROXINE SODIUM 0.03 MG/1
TABLET ORAL
Qty: 90 TABLET | Refills: 0 | Status: SHIPPED
Start: 2022-04-11

## 2022-04-11 RX ORDER — PROPRANOLOL HYDROCHLORIDE 20 MG/1
TABLET ORAL
Qty: 180 TABLET | Refills: 0 | Status: SHIPPED | OUTPATIENT
Start: 2022-04-11

## 2022-04-11 NOTE — TELEPHONE ENCOUNTER
Last VISIT - 10/1/21 Wellness visit    Last CPE - 10/1/21    Last REFILL -     PROPRANOLOL 20 MG Oral Tab 180 tablet 0 1/3/2022      Last LABS - 2/25/22 cbc    Per PROTOCOL? FAILED    Please Approve or Deny.

## 2022-04-11 NOTE — TELEPHONE ENCOUNTER
Last VISIT - 10/1/21 Wellness visit    Last CPE - 10/1/21    Last REFILL -     LEVOTHYROXINE 25 MCG Oral Tab 90 tablet 0 1/4/2022     Last LABS - 2/25/22 cbc    Per PROTOCOL? PASSED    Please Approve or Deny.

## 2022-04-13 RX ORDER — LEVOTHYROXINE SODIUM 0.03 MG/1
TABLET ORAL
Qty: 90 TABLET | Refills: 0 | OUTPATIENT
Start: 2022-04-13

## 2022-04-13 NOTE — TELEPHONE ENCOUNTER
Last VISIT - 10/1/21 Wellness visit    Last CPE - 10/1/21    Last REFILL -     LEVOTHYROXINE 25 MCG Oral Tab 90 tablet 0 4/11/2022      Last LABS - 2/25/22 cbc    Per PROTOCOL? PASSED    Please Approve or Deny. staples

## 2022-04-19 ENCOUNTER — PATIENT OUTREACH (OUTPATIENT)
Dept: CASE MANAGEMENT | Age: 72
End: 2022-04-19

## 2022-06-27 ENCOUNTER — LAB ENCOUNTER (OUTPATIENT)
Dept: LAB | Facility: HOSPITAL | Age: 72
End: 2022-06-27
Attending: INTERNAL MEDICINE
Payer: MEDICARE

## 2022-06-27 ENCOUNTER — TELEPHONE (OUTPATIENT)
Dept: SURGERY | Facility: CLINIC | Age: 72
End: 2022-06-27

## 2022-06-27 DIAGNOSIS — K75.4 AUTOIMMUNE HEPATITIS (HCC): ICD-10-CM

## 2022-06-27 DIAGNOSIS — K74.3 PRIMARY BILIARY CIRRHOSIS (HCC): ICD-10-CM

## 2022-06-27 DIAGNOSIS — K74.69 OTHER CIRRHOSIS OF LIVER (HCC): ICD-10-CM

## 2022-06-27 DIAGNOSIS — K75.4 AUTOIMMUNE HEPATITIS (HCC): Primary | ICD-10-CM

## 2022-06-27 LAB
AFP-TM SERPL-MCNC: 8.2 NG/ML (ref ?–8)
ALBUMIN SERPL-MCNC: 3.6 G/DL (ref 3.4–5)
ALBUMIN/GLOB SERPL: 0.9 {RATIO} (ref 1–2)
ALP LIVER SERPL-CCNC: 106 U/L
ALT SERPL-CCNC: 23 U/L
ANION GAP SERPL CALC-SCNC: 6 MMOL/L (ref 0–18)
AST SERPL-CCNC: 49 U/L (ref 15–37)
BASOPHILS # BLD AUTO: 0.01 X10(3) UL (ref 0–0.2)
BASOPHILS NFR BLD AUTO: 0.5 %
BILIRUB SERPL-MCNC: 1.8 MG/DL (ref 0.1–2)
BUN BLD-MCNC: 16 MG/DL (ref 7–18)
CALCIUM BLD-MCNC: 9.4 MG/DL (ref 8.5–10.1)
CHLORIDE SERPL-SCNC: 107 MMOL/L (ref 98–112)
CO2 SERPL-SCNC: 27 MMOL/L (ref 21–32)
CREAT BLD-MCNC: 1.03 MG/DL
EOSINOPHIL # BLD AUTO: 0.08 X10(3) UL (ref 0–0.7)
EOSINOPHIL NFR BLD AUTO: 4.1 %
ERYTHROCYTE [DISTWIDTH] IN BLOOD BY AUTOMATED COUNT: 12.8 %
FASTING STATUS PATIENT QL REPORTED: YES
GLOBULIN PLAS-MCNC: 4.1 G/DL (ref 2.8–4.4)
GLUCOSE BLD-MCNC: 90 MG/DL (ref 70–99)
HCT VFR BLD AUTO: 36.6 %
HGB BLD-MCNC: 12.7 G/DL
IMM GRANULOCYTES # BLD AUTO: 0.01 X10(3) UL (ref 0–1)
IMM GRANULOCYTES NFR BLD: 0.5 %
LYMPHOCYTES # BLD AUTO: 0.42 X10(3) UL (ref 1–4)
LYMPHOCYTES NFR BLD AUTO: 21.5 %
MCH RBC QN AUTO: 35.6 PG (ref 26–34)
MCHC RBC AUTO-ENTMCNC: 34.7 G/DL (ref 31–37)
MCV RBC AUTO: 102.5 FL
MONOCYTES # BLD AUTO: 0.19 X10(3) UL (ref 0.1–1)
MONOCYTES NFR BLD AUTO: 9.7 %
NEUTROPHILS # BLD AUTO: 1.24 X10 (3) UL (ref 1.5–7.7)
NEUTROPHILS # BLD AUTO: 1.24 X10(3) UL (ref 1.5–7.7)
NEUTROPHILS NFR BLD AUTO: 63.7 %
OSMOLALITY SERPL CALC.SUM OF ELEC: 291 MOSM/KG (ref 275–295)
PLATELET # BLD AUTO: 98 10(3)UL (ref 150–450)
POTASSIUM SERPL-SCNC: 4.2 MMOL/L (ref 3.5–5.1)
PROT SERPL-MCNC: 7.7 G/DL (ref 6.4–8.2)
RBC # BLD AUTO: 3.57 X10(6)UL
SODIUM SERPL-SCNC: 140 MMOL/L (ref 136–145)
WBC # BLD AUTO: 2 X10(3) UL (ref 4–11)

## 2022-06-27 PROCEDURE — 85025 COMPLETE CBC W/AUTO DIFF WBC: CPT

## 2022-06-27 PROCEDURE — 36415 COLL VENOUS BLD VENIPUNCTURE: CPT

## 2022-06-27 PROCEDURE — 80053 COMPREHEN METABOLIC PANEL: CPT

## 2022-06-27 PROCEDURE — 80299 QUANTITATIVE ASSAY DRUG: CPT

## 2022-06-27 PROCEDURE — 82105 ALPHA-FETOPROTEIN SERUM: CPT

## 2022-07-02 LAB
6-METHYLMERCAPTOPURINE (6-MMP): <392 PMOL/8X10(8)RBC
6-THIOGUANINE (6-GT): 175 PMOL/8X10(8)RBC

## 2022-07-03 NOTE — PROGRESS NOTES
Barbara,    Labs look stable. No changes to meds at this time. Please complete MRI liver as was previously scheduled. Repeat labs in 4 months.   Please call with any questions,    Maximiliano Herrera MD

## 2022-08-01 NOTE — TELEPHONE ENCOUNTER
Last VISIT - 10/1/21 Wellness visit    Last CPE - 10/1/21    Last REFILL -     PROPRANOLOL 20 MG Oral Tab 180 tablet 0 4/11/2022     Last LABS - 6/27/22 cmp cbc 2/18/22tsh    Per PROTOCOL? FAILED    Please Approve or Deny.

## 2022-08-02 RX ORDER — PROPRANOLOL HYDROCHLORIDE 20 MG/1
TABLET ORAL
Qty: 180 TABLET | Refills: 0 | Status: SHIPPED | OUTPATIENT
Start: 2022-08-02

## 2022-08-03 ENCOUNTER — TELEPHONE (OUTPATIENT)
Dept: INTERNAL MEDICINE CLINIC | Facility: CLINIC | Age: 72
End: 2022-08-03

## 2022-08-03 DIAGNOSIS — Z00.00 ENCOUNTER FOR ANNUAL HEALTH EXAMINATION: Primary | ICD-10-CM

## 2022-08-03 DIAGNOSIS — E03.9 HYPOTHYROIDISM, UNSPECIFIED TYPE: ICD-10-CM

## 2022-08-03 DIAGNOSIS — I10 HYPERTENSION, UNSPECIFIED TYPE: ICD-10-CM

## 2022-08-03 NOTE — TELEPHONE ENCOUNTER
Future Appointments   Date Time Provider Maynor Hinson   10/6/2022 11:00 AM Isela Canela MD [facility]     Patient is scheduled for [address] Please place orders with Zelda Dave. Patient aware to fast.  No call back required. Patient informed that labs need to be completed no sooner than 2 weeks prior to the appointment.

## 2022-08-03 NOTE — TELEPHONE ENCOUNTER
Future Appointments   Date Time Provider Maynor Joya   8/26/2022 11:00 AM Sarah Clay MD [address] [facility]   9/20/2022  1:00 PM Lavinia Paul APRN [facility]   10/6/2022 11:00 AM Kimmie Hooks MD [facility]

## 2022-08-19 ENCOUNTER — HOSPITAL ENCOUNTER (OUTPATIENT)
Dept: MRI IMAGING | Age: 72
Discharge: HOME | End: 2022-08-19
Attending: STUDENT IN AN ORGANIZED HEALTH CARE EDUCATION/TRAINING PROGRAM
Payer: MEDICARE

## 2022-08-19 ENCOUNTER — LAB ENCOUNTER (OUTPATIENT)
Dept: LAB | Age: 72
End: 2022-08-19
Attending: STUDENT IN AN ORGANIZED HEALTH CARE EDUCATION/TRAINING PROGRAM
Payer: MEDICARE

## 2022-08-19 DIAGNOSIS — K74.3 PRIMARY BILIARY CIRRHOSIS (HCC): ICD-10-CM

## 2022-08-19 DIAGNOSIS — D72.819 LEUKOPENIA, UNSPECIFIED TYPE: ICD-10-CM

## 2022-08-19 DIAGNOSIS — D69.6 THROMBOCYTOPENIA (HCC): ICD-10-CM

## 2022-08-19 DIAGNOSIS — D47.2 MGUS (MONOCLONAL GAMMOPATHY OF UNKNOWN SIGNIFICANCE): ICD-10-CM

## 2022-08-19 LAB
ALBUMIN SERPL-MCNC: 3.6 G/DL (ref 3.4–5)
ALBUMIN/GLOB SERPL: 0.8 {RATIO} (ref 1–2)
ALP LIVER SERPL-CCNC: 107 U/L
ALT SERPL-CCNC: 27 U/L
ANION GAP SERPL CALC-SCNC: 4 MMOL/L (ref 0–18)
AST SERPL-CCNC: 53 U/L (ref 15–37)
BASOPHILS # BLD AUTO: 0.02 X10(3) UL (ref 0–0.2)
BASOPHILS NFR BLD AUTO: 1.1 %
BILIRUB SERPL-MCNC: 2 MG/DL (ref 0.1–2)
BUN BLD-MCNC: 16 MG/DL (ref 7–18)
CALCIUM BLD-MCNC: 9.3 MG/DL (ref 8.5–10.1)
CHLORIDE SERPL-SCNC: 109 MMOL/L (ref 98–112)
CO2 SERPL-SCNC: 28 MMOL/L (ref 21–32)
CREAT BLD-MCNC: 0.95 MG/DL
EOSINOPHIL # BLD AUTO: 0.08 X10(3) UL (ref 0–0.7)
EOSINOPHIL NFR BLD AUTO: 4.6 %
ERYTHROCYTE [DISTWIDTH] IN BLOOD BY AUTOMATED COUNT: 12.8 %
FASTING STATUS PATIENT QL REPORTED: YES
GFR SERPLBLD BASED ON 1.73 SQ M-ARVRAT: 63 ML/MIN/1.73M2 (ref 60–?)
GLOBULIN PLAS-MCNC: 4.3 G/DL (ref 2.8–4.4)
GLUCOSE BLD-MCNC: 94 MG/DL (ref 70–99)
HCT VFR BLD AUTO: 37.9 %
HGB BLD-MCNC: 12.8 G/DL
IMM GRANULOCYTES # BLD AUTO: 0.01 X10(3) UL (ref 0–1)
IMM GRANULOCYTES NFR BLD: 0.6 %
LYMPHOCYTES # BLD AUTO: 0.47 X10(3) UL (ref 1–4)
LYMPHOCYTES NFR BLD AUTO: 27 %
MCH RBC QN AUTO: 35.6 PG (ref 26–34)
MCHC RBC AUTO-ENTMCNC: 33.8 G/DL (ref 31–37)
MCV RBC AUTO: 105.3 FL
MONOCYTES # BLD AUTO: 0.24 X10(3) UL (ref 0.1–1)
MONOCYTES NFR BLD AUTO: 13.8 %
NEUTROPHILS # BLD AUTO: 0.92 X10 (3) UL (ref 1.5–7.7)
NEUTROPHILS # BLD AUTO: 0.92 X10(3) UL (ref 1.5–7.7)
NEUTROPHILS NFR BLD AUTO: 52.9 %
OSMOLALITY SERPL CALC.SUM OF ELEC: 293 MOSM/KG (ref 275–295)
PLATELET # BLD AUTO: 110 10(3)UL (ref 150–450)
POTASSIUM SERPL-SCNC: 4.1 MMOL/L (ref 3.5–5.1)
PROT SERPL-MCNC: 7.9 G/DL (ref 6.4–8.2)
RBC # BLD AUTO: 3.6 X10(6)UL
SODIUM SERPL-SCNC: 141 MMOL/L (ref 136–145)
WBC # BLD AUTO: 1.7 X10(3) UL (ref 4–11)

## 2022-08-19 PROCEDURE — 83521 IG LIGHT CHAINS FREE EACH: CPT

## 2022-08-19 PROCEDURE — 36415 COLL VENOUS BLD VENIPUNCTURE: CPT

## 2022-08-19 PROCEDURE — 85025 COMPLETE CBC W/AUTO DIFF WBC: CPT

## 2022-08-19 PROCEDURE — 86334 IMMUNOFIX E-PHORESIS SERUM: CPT

## 2022-08-19 PROCEDURE — 84165 PROTEIN E-PHORESIS SERUM: CPT

## 2022-08-19 PROCEDURE — 80053 COMPREHEN METABOLIC PANEL: CPT

## 2022-08-19 PROCEDURE — 74183 MRI ABD W/O CNTR FLWD CNTR: CPT | Performed by: STUDENT IN AN ORGANIZED HEALTH CARE EDUCATION/TRAINING PROGRAM

## 2022-08-19 PROCEDURE — A9581 GADOXETATE DISODIUM INJ: HCPCS | Performed by: STUDENT IN AN ORGANIZED HEALTH CARE EDUCATION/TRAINING PROGRAM

## 2022-08-20 NOTE — PROGRESS NOTES
Mrs Stump,    MRI liver stable. No changes, repeat in 6 months.   Please call with any questions,    Aguilar Mendez MD

## 2022-08-25 LAB
ALBUMIN SERPL ELPH-MCNC: 3.97 G/DL (ref 3.75–5.21)
ALBUMIN/GLOB SERPL: 1.1 {RATIO} (ref 1–2)
ALPHA1 GLOB SERPL ELPH-MCNC: 0.26 G/DL (ref 0.19–0.46)
ALPHA2 GLOB SERPL ELPH-MCNC: 0.48 G/DL (ref 0.48–1.05)
B-GLOBULIN SERPL ELPH-MCNC: 0.89 G/DL (ref 0.68–1.23)
GAMMA GLOB SERPL ELPH-MCNC: 2 G/DL (ref 0.62–1.7)
KAPPA LC FREE SER-MCNC: 7.22 MG/DL (ref 0.33–1.94)
KAPPA LC FREE/LAMBDA FREE SER NEPH: 1.63 {RATIO} (ref 0.26–1.65)
LAMBDA LC FREE SERPL-MCNC: 4.43 MG/DL (ref 0.57–2.63)
PROT SERPL-MCNC: 7.6 G/DL (ref 6.4–8.2)

## 2022-08-26 ENCOUNTER — OFFICE VISIT (OUTPATIENT)
Dept: HEMATOLOGY/ONCOLOGY | Facility: HOSPITAL | Age: 72
End: 2022-08-26
Attending: INTERNAL MEDICINE
Payer: MEDICARE

## 2022-08-26 VITALS
RESPIRATION RATE: 16 BRPM | OXYGEN SATURATION: 98 % | TEMPERATURE: 99 F | DIASTOLIC BLOOD PRESSURE: 74 MMHG | SYSTOLIC BLOOD PRESSURE: 130 MMHG | WEIGHT: 111.5 LBS | HEIGHT: 60.98 IN | BODY MASS INDEX: 21.05 KG/M2 | HEART RATE: 60 BPM

## 2022-08-26 DIAGNOSIS — D47.2 IGM LAMBDA MONOCLONAL GAMMOPATHY: Primary | ICD-10-CM

## 2022-08-26 DIAGNOSIS — D69.6 THROMBOCYTOPENIA (HCC): ICD-10-CM

## 2022-08-26 DIAGNOSIS — D72.819 LEUKOPENIA, UNSPECIFIED TYPE: ICD-10-CM

## 2022-08-26 PROCEDURE — 99211 OFF/OP EST MAY X REQ PHY/QHP: CPT

## 2022-08-26 NOTE — PROGRESS NOTES
Outpatient Oncology Care Plan  Problem list:  knowledge deficit    Problems related to:    disease/disease progression    Interventions:  provided general teaching    Expected outcomes:  understands plan of care    Progress towards outcome:  making progress    Education Record    Learner:  Patient  Barriers / Limitations:  None  Method:  Brief focused  Outcome:  Shows understanding  Comments:  Pt here for follow up. No new complaints. She continues to have days where she feels nauseated.

## 2022-08-29 ENCOUNTER — HOSPITAL ENCOUNTER (OUTPATIENT)
Dept: CT IMAGING | Facility: HOSPITAL | Age: 72
Discharge: HOME | End: 2022-08-29
Attending: INTERNAL MEDICINE
Payer: MEDICARE

## 2022-08-29 ENCOUNTER — TELEPHONE (OUTPATIENT)
Dept: HEMATOLOGY/ONCOLOGY | Facility: HOSPITAL | Age: 72
End: 2022-08-29

## 2022-08-29 DIAGNOSIS — D47.2 IGM LAMBDA MONOCLONAL GAMMOPATHY: ICD-10-CM

## 2022-08-29 PROCEDURE — 71260 CT THORAX DX C+: CPT | Performed by: INTERNAL MEDICINE

## 2022-08-29 PROCEDURE — 74177 CT ABD & PELVIS W/CONTRAST: CPT | Performed by: INTERNAL MEDICINE

## 2022-08-29 RX ORDER — IOHEXOL 350 MG/ML
65 INJECTION, SOLUTION INTRAVENOUS
Status: COMPLETED | OUTPATIENT
Start: 2022-08-29 | End: 2022-08-29

## 2022-08-29 RX ADMIN — IOHEXOL 65 ML: 350 INJECTION, SOLUTION INTRAVENOUS at 08:40:00

## 2022-08-31 ENCOUNTER — TELEPHONE (OUTPATIENT)
Dept: INTERNAL MEDICINE CLINIC | Facility: CLINIC | Age: 72
End: 2022-08-31

## 2022-08-31 ENCOUNTER — LAB ENCOUNTER (OUTPATIENT)
Dept: LAB | Facility: HOSPITAL | Age: 72
End: 2022-08-31
Attending: INTERNAL MEDICINE
Payer: MEDICARE

## 2022-08-31 DIAGNOSIS — D47.2 IGM LAMBDA MONOCLONAL GAMMOPATHY: ICD-10-CM

## 2022-08-31 PROCEDURE — 86335 IMMUNFIX E-PHORSIS/URINE/CSF: CPT

## 2022-08-31 PROCEDURE — 84156 ASSAY OF PROTEIN URINE: CPT

## 2022-08-31 PROCEDURE — 84166 PROTEIN E-PHORESIS/URINE/CSF: CPT

## 2022-08-31 NOTE — TELEPHONE ENCOUNTER
Future Appointments   Date Time Provider Maynor Joya   9/2/2022  3:00 PM Brad Lomeli MD [facility]     Pre bone marrow bx on tues 9/6-pt to have pre adm testing fax us paperwork today or tomorrow    Pt aware we may need to cx appt if she cannot get us the paperwork prior to appt

## 2022-09-01 ENCOUNTER — TELEPHONE (OUTPATIENT)
Dept: HEMATOLOGY/ONCOLOGY | Facility: HOSPITAL | Age: 72
End: 2022-09-01

## 2022-09-01 LAB
M PROTEIN 24H UR ELPH-MRATE: 26.4 MG/24 HR (ref ?–149.1)
SPECIMEN VOL UR: 400 ML

## 2022-09-01 NOTE — TELEPHONE ENCOUNTER
Patient called about a physical Dr Gianluca Long said she needs before her biopsy. She seemed very confused on what she was supposed to do. Please call to clarify. Thank you.

## 2022-09-01 NOTE — TELEPHONE ENCOUNTER
Patient just called and spoke w/PD-said per Dr. Zachary Hernandez she does not need clearance so we cancelled this appointment.

## 2022-09-03 ENCOUNTER — LAB ENCOUNTER (OUTPATIENT)
Dept: LAB | Age: 72
End: 2022-09-03
Attending: INTERNAL MEDICINE
Payer: MEDICARE

## 2022-09-03 DIAGNOSIS — D47.2 IGM LAMBDA MONOCLONAL GAMMOPATHY: ICD-10-CM

## 2022-09-05 LAB — SARS-COV-2 RNA RESP QL NAA+PROBE: NOT DETECTED

## 2022-09-06 ENCOUNTER — HOSPITAL ENCOUNTER (OUTPATIENT)
Dept: CT IMAGING | Facility: HOSPITAL | Age: 72
Discharge: HOME | End: 2022-09-06
Attending: INTERNAL MEDICINE
Payer: MEDICARE

## 2022-09-06 ENCOUNTER — LAB ENCOUNTER (OUTPATIENT)
Dept: LAB | Facility: HOSPITAL | Age: 72
End: 2022-09-06
Attending: INTERNAL MEDICINE
Payer: MEDICARE

## 2022-09-06 VITALS
RESPIRATION RATE: 16 BRPM | BODY MASS INDEX: 21.79 KG/M2 | DIASTOLIC BLOOD PRESSURE: 63 MMHG | HEIGHT: 60 IN | OXYGEN SATURATION: 99 % | SYSTOLIC BLOOD PRESSURE: 108 MMHG | WEIGHT: 111 LBS | TEMPERATURE: 98 F | HEART RATE: 49 BPM

## 2022-09-06 DIAGNOSIS — D47.2 IGM LAMBDA MONOCLONAL GAMMOPATHY: ICD-10-CM

## 2022-09-06 DIAGNOSIS — D47.2 MONOCLONAL GAMMOPATHY OF UNKNOWN SIGNIFICANCE (MGUS): Primary | ICD-10-CM

## 2022-09-06 DIAGNOSIS — D72.819 LEUKOPENIA: Primary | ICD-10-CM

## 2022-09-06 DIAGNOSIS — D47.2 MONOCLONAL GAMMOPATHY OF UNKNOWN SIGNIFICANCE (MGUS): ICD-10-CM

## 2022-09-06 LAB
INR BLD: 1.16 (ref 0.85–1.16)
PROTHROMBIN TIME: 14.8 SECONDS (ref 11.6–14.8)

## 2022-09-06 PROCEDURE — 38222 DX BONE MARROW BX & ASPIR: CPT | Performed by: INTERNAL MEDICINE

## 2022-09-06 PROCEDURE — 85097 BONE MARROW INTERPRETATION: CPT | Performed by: INTERNAL MEDICINE

## 2022-09-06 PROCEDURE — 88264 CHROMOSOME ANALYSIS 20-25: CPT

## 2022-09-06 PROCEDURE — 77012 CT SCAN FOR NEEDLE BIOPSY: CPT | Performed by: INTERNAL MEDICINE

## 2022-09-06 PROCEDURE — 36415 COLL VENOUS BLD VENIPUNCTURE: CPT

## 2022-09-06 PROCEDURE — 88313 SPECIAL STAINS GROUP 2: CPT | Performed by: INTERNAL MEDICINE

## 2022-09-06 PROCEDURE — 99152 MOD SED SAME PHYS/QHP 5/>YRS: CPT | Performed by: INTERNAL MEDICINE

## 2022-09-06 PROCEDURE — 88342 IMHCHEM/IMCYTCHM 1ST ANTB: CPT | Performed by: INTERNAL MEDICINE

## 2022-09-06 PROCEDURE — 88341 IMHCHEM/IMCYTCHM EA ADD ANTB: CPT | Performed by: INTERNAL MEDICINE

## 2022-09-06 PROCEDURE — 88305 TISSUE EXAM BY PATHOLOGIST: CPT | Performed by: INTERNAL MEDICINE

## 2022-09-06 PROCEDURE — 88237 TISSUE CULTURE BONE MARROW: CPT

## 2022-09-06 PROCEDURE — 85610 PROTHROMBIN TIME: CPT

## 2022-09-06 PROCEDURE — 88291 CYTO/MOLECULAR REPORT: CPT

## 2022-09-06 RX ORDER — SODIUM CHLORIDE 9 MG/ML
INJECTION, SOLUTION INTRAVENOUS CONTINUOUS
Status: DISCONTINUED | OUTPATIENT
Start: 2022-09-06 | End: 2022-09-08

## 2022-09-06 RX ORDER — MIDAZOLAM HYDROCHLORIDE 1 MG/ML
1 INJECTION INTRAMUSCULAR; INTRAVENOUS EVERY 5 MIN PRN
Status: ACTIVE | OUTPATIENT
Start: 2022-09-06 | End: 2022-09-06

## 2022-09-06 RX ORDER — NALOXONE HYDROCHLORIDE 0.4 MG/ML
80 INJECTION, SOLUTION INTRAMUSCULAR; INTRAVENOUS; SUBCUTANEOUS AS NEEDED
Status: DISCONTINUED | OUTPATIENT
Start: 2022-09-06 | End: 2022-09-08

## 2022-09-06 RX ORDER — FLUMAZENIL 0.1 MG/ML
0.2 INJECTION, SOLUTION INTRAVENOUS AS NEEDED
Status: DISCONTINUED | OUTPATIENT
Start: 2022-09-06 | End: 2022-09-08

## 2022-09-06 RX ORDER — MIDAZOLAM HYDROCHLORIDE 1 MG/ML
INJECTION INTRAMUSCULAR; INTRAVENOUS
Status: COMPLETED
Start: 2022-09-06 | End: 2022-09-06

## 2022-09-06 RX ADMIN — MIDAZOLAM HYDROCHLORIDE 1 MG: 1 INJECTION INTRAMUSCULAR; INTRAVENOUS at 10:41:00

## 2022-09-06 RX ADMIN — SODIUM CHLORIDE: 9 INJECTION, SOLUTION INTRAVENOUS at 10:32:00

## 2022-09-06 NOTE — IMAGING NOTE
Pt post bone marrow biopsy. Tolerated procedure and sedation well. Denies any pain. Biospy site left lower back CDI with tegaderm. Alert and oriented. Report called to [facility] in [facility] and bed assigned.

## 2022-09-06 NOTE — PROCEDURES
[facility]  Procedure Note    Nicole Blaise Stump Patient Status:  Outpatient    6/10/1950 MRN [id number]   [facility] Attending Sarahi Chicas MD   Hosp Day # 0 PCP Annie Teran MD     Procedure: Bone marrow biopsy    Pre-Procedure Diagnosis:  Leukopenia    Post-Procedure Diagnosis: Same    Anesthesia:  Sedation    Findings:  No complication    Specimens: Bone marrow aspirate and core    Blood Loss:  < 5 cc    Tourniquet Time: None  Complications:  None  Drains:  None    Secondary Diagnosis:  N/A    Urban Mcdowell MD  2022

## 2022-09-14 LAB
CD10 CELLS NFR SPEC: 1 %
CD10/CD19: <1 %
CD19 CELLS NFR SPEC: 11 %
CD19+/CD200+: 3 %
CD2 CELLS NFR SPEC: 83 %
CD20 CELLS NFR SPEC: 9 %
CD200 CELLS: 6 %
CD3 CELLS NFR SPEC: 81 %
CD3+/TCRGD+: 3 %
CD3+CD4+ CELLS NFR SPEC: 55 %
CD3+CD4+ CELLS/CD3+CD8+ CLL SPEC: 2.2
CD3+CD8+ CELLS NFR SPEC: 25 %
CD3-/CD56+: 2 %
CD34 CELLS NFR SPEC: 1 %
CD38 CELLS NFR SPEC: 10 %
CD45 CELLS NFR SPEC: 100 %
CD5 CELLS NFR SPEC: 81 %
CD5/CD19 CELLS: 1 %
CD7 CELLS NFR SPEC: 79 %
CELL SURF KAPPA/LAMBDA RATIO: 2
CELL SURF LAMBDA LIGHT CHAIN: 3 %
CELL SURFACE KAPPA LIGHT CHAIN: 6 %
TCR G-D CELLS NFR SPEC: 3 %

## 2022-09-23 ENCOUNTER — OFFICE VISIT (OUTPATIENT)
Dept: HEMATOLOGY/ONCOLOGY | Facility: HOSPITAL | Age: 72
End: 2022-09-23
Attending: INTERNAL MEDICINE

## 2022-09-23 VITALS
RESPIRATION RATE: 16 BRPM | TEMPERATURE: 98 F | WEIGHT: 114.5 LBS | OXYGEN SATURATION: 99 % | DIASTOLIC BLOOD PRESSURE: 59 MMHG | BODY MASS INDEX: 21.62 KG/M2 | SYSTOLIC BLOOD PRESSURE: 145 MMHG | HEART RATE: 55 BPM | HEIGHT: 60.98 IN

## 2022-09-23 DIAGNOSIS — D47.2 IGM LAMBDA MONOCLONAL GAMMOPATHY: Primary | ICD-10-CM

## 2022-09-23 DIAGNOSIS — R91.8 RETICULONODULAR INFILTRATE PRESENT ON IMAGING OF CHEST: ICD-10-CM

## 2022-09-23 LAB
ALBUMIN SERPL-MCNC: 3.6 G/DL (ref 3.4–5)
ALBUMIN/GLOB SERPL: 0.8 {RATIO} (ref 1–2)
ALP LIVER SERPL-CCNC: 120 U/L
ALT SERPL-CCNC: 37 U/L
ANION GAP SERPL CALC-SCNC: 5 MMOL/L (ref 0–18)
AST SERPL-CCNC: 75 U/L (ref 15–37)
BASOPHILS # BLD AUTO: 0.01 X10(3) UL (ref 0–0.2)
BASOPHILS NFR BLD AUTO: 0.5 %
BILIRUB SERPL-MCNC: 1.8 MG/DL (ref 0.1–2)
BUN BLD-MCNC: 17 MG/DL (ref 7–18)
CALCIUM BLD-MCNC: 9.7 MG/DL (ref 8.5–10.1)
CHLORIDE SERPL-SCNC: 110 MMOL/L (ref 98–112)
CO2 SERPL-SCNC: 26 MMOL/L (ref 21–32)
CREAT BLD-MCNC: 0.9 MG/DL
EOSINOPHIL # BLD AUTO: 0.11 X10(3) UL (ref 0–0.7)
EOSINOPHIL NFR BLD AUTO: 5.9 %
ERYTHROCYTE [DISTWIDTH] IN BLOOD BY AUTOMATED COUNT: 12.3 %
GFR SERPLBLD BASED ON 1.73 SQ M-ARVRAT: 67 ML/MIN/1.73M2 (ref 60–?)
GLOBULIN PLAS-MCNC: 4.7 G/DL (ref 2.8–4.4)
GLUCOSE BLD-MCNC: 88 MG/DL (ref 70–99)
HCT VFR BLD AUTO: 37.5 %
HGB BLD-MCNC: 13 G/DL
IMM GRANULOCYTES # BLD AUTO: 0.01 X10(3) UL (ref 0–1)
IMM GRANULOCYTES NFR BLD: 0.5 %
LDH SERPL L TO P-CCNC: 336 U/L
LYMPHOCYTES # BLD AUTO: 0.45 X10(3) UL (ref 1–4)
LYMPHOCYTES NFR BLD AUTO: 24.1 %
MCH RBC QN AUTO: 35.4 PG (ref 26–34)
MCHC RBC AUTO-ENTMCNC: 34.7 G/DL (ref 31–37)
MCV RBC AUTO: 102.2 FL
MONOCYTES # BLD AUTO: 0.2 X10(3) UL (ref 0.1–1)
MONOCYTES NFR BLD AUTO: 10.7 %
NEUTROPHILS # BLD AUTO: 1.09 X10 (3) UL (ref 1.5–7.7)
NEUTROPHILS # BLD AUTO: 1.09 X10(3) UL (ref 1.5–7.7)
NEUTROPHILS NFR BLD AUTO: 58.3 %
OSMOLALITY SERPL CALC.SUM OF ELEC: 293 MOSM/KG (ref 275–295)
PLATELET # BLD AUTO: 108 10(3)UL (ref 150–450)
POTASSIUM SERPL-SCNC: 4 MMOL/L (ref 3.5–5.1)
PROT SERPL-MCNC: 8.3 G/DL (ref 6.4–8.2)
RBC # BLD AUTO: 3.67 X10(6)UL
SODIUM SERPL-SCNC: 141 MMOL/L (ref 136–145)
WBC # BLD AUTO: 1.9 X10(3) UL (ref 4–11)

## 2022-09-23 PROCEDURE — 99214 OFFICE O/P EST MOD 30 MIN: CPT | Performed by: INTERNAL MEDICINE

## 2022-09-23 NOTE — PROGRESS NOTES
Pt here for follow up and test results. No new complaints.     Outpatient Oncology Care Plan  Problem list:  knowledge deficit    Problems related to:    disease/disease progression    Interventions:  provided general teaching    Expected outcomes:  understands plan of care    Progress towards outcome:  making progress    Education Record    Learner:  Patient  Barriers / Limitations:  None  Method:  Brief focused  Outcome:  Shows understanding  Comments:

## 2022-11-30 RX ORDER — PROPRANOLOL HYDROCHLORIDE 20 MG/1
TABLET ORAL
Qty: 180 TABLET | Refills: 0 | OUTPATIENT
Start: 2022-11-30

## 2022-12-28 ENCOUNTER — TELEPHONE (OUTPATIENT)
Dept: INTERNAL MEDICINE CLINIC | Facility: CLINIC | Age: 72
End: 2022-12-28

## 2022-12-28 NOTE — TELEPHONE ENCOUNTER
Future Appointments   Date Time Provider Maynor Hinson   3/2/2023  4:20 PM Vernona Shone, MD [facility]     Orders to edward- Pt informed that labs need to be completed no sooner than 2 weeks prior to the appt.  Pt aware to fast-no call back required

## 2023-01-17 ENCOUNTER — LAB ENCOUNTER (OUTPATIENT)
Dept: LAB | Facility: HOSPITAL | Age: 73
End: 2023-01-17
Attending: INTERNAL MEDICINE
Payer: MEDICARE

## 2023-01-17 DIAGNOSIS — K74.69 OTHER CIRRHOSIS OF LIVER (HCC): ICD-10-CM

## 2023-01-17 LAB — AFP-TM SERPL-MCNC: 7.5 NG/ML (ref ?–8)

## 2023-01-17 PROCEDURE — 36415 COLL VENOUS BLD VENIPUNCTURE: CPT

## 2023-01-17 PROCEDURE — 82105 ALPHA-FETOPROTEIN SERUM: CPT

## 2023-01-18 NOTE — PROGRESS NOTES
Labs look great. AFP back to normal levels. Repeat labs in 6 months.   Please call with any questions,    Maximiliano Herrera MD

## 2023-01-26 RX ORDER — PROPRANOLOL HYDROCHLORIDE 20 MG/1
TABLET ORAL
Qty: 60 TABLET | Refills: 1 | Status: SHIPPED | OUTPATIENT
Start: 2023-01-26

## 2023-01-26 RX ORDER — PROPRANOLOL HYDROCHLORIDE 20 MG/1
TABLET ORAL
Qty: 180 TABLET | Refills: 0 | OUTPATIENT
Start: 2023-01-26

## 2023-02-02 ENCOUNTER — TELEPHONE (OUTPATIENT)
Dept: INTERNAL MEDICINE CLINIC | Facility: CLINIC | Age: 73
End: 2023-02-02

## 2023-02-15 ENCOUNTER — LAB ENCOUNTER (OUTPATIENT)
Dept: LAB | Facility: HOSPITAL | Age: 73
End: 2023-02-15
Attending: INTERNAL MEDICINE
Payer: MEDICARE

## 2023-02-15 ENCOUNTER — HOSPITAL ENCOUNTER (OUTPATIENT)
Dept: CT IMAGING | Facility: HOSPITAL | Age: 73
Discharge: HOME | End: 2023-02-15
Attending: INTERNAL MEDICINE
Payer: MEDICARE

## 2023-02-15 DIAGNOSIS — I10 HYPERTENSION, UNSPECIFIED TYPE: ICD-10-CM

## 2023-02-15 DIAGNOSIS — Z00.00 ENCOUNTER FOR ANNUAL HEALTH EXAMINATION: ICD-10-CM

## 2023-02-15 DIAGNOSIS — E03.9 HYPOTHYROIDISM, UNSPECIFIED TYPE: ICD-10-CM

## 2023-02-15 DIAGNOSIS — R91.8 RETICULONODULAR INFILTRATE PRESENT ON IMAGING OF CHEST: ICD-10-CM

## 2023-02-15 LAB
CHOLEST SERPL-MCNC: 134 MG/DL (ref ?–200)
CREAT BLD-MCNC: 1 MG/DL
FASTING PATIENT LIPID ANSWER: YES
GFR SERPLBLD BASED ON 1.73 SQ M-ARVRAT: 59 ML/MIN/1.73M2 (ref 60–?)
HDLC SERPL-MCNC: 64 MG/DL (ref 40–59)
LDLC SERPL CALC-MCNC: 57 MG/DL (ref ?–100)
NONHDLC SERPL-MCNC: 70 MG/DL (ref ?–130)
T4 FREE SERPL-MCNC: 1 NG/DL (ref 0.8–1.7)
TRIGL SERPL-MCNC: 65 MG/DL (ref 30–149)
TSI SER-ACNC: 7.36 MIU/ML (ref 0.36–3.74)
VLDLC SERPL CALC-MCNC: 9 MG/DL (ref 0–30)

## 2023-02-15 PROCEDURE — 71260 CT THORAX DX C+: CPT | Performed by: INTERNAL MEDICINE

## 2023-02-15 PROCEDURE — 80061 LIPID PANEL: CPT

## 2023-02-15 PROCEDURE — 84439 ASSAY OF FREE THYROXINE: CPT

## 2023-02-15 PROCEDURE — 84443 ASSAY THYROID STIM HORMONE: CPT

## 2023-02-15 PROCEDURE — 36415 COLL VENOUS BLD VENIPUNCTURE: CPT

## 2023-02-15 PROCEDURE — 82565 ASSAY OF CREATININE: CPT

## 2023-03-02 ENCOUNTER — OFFICE VISIT (OUTPATIENT)
Dept: INTERNAL MEDICINE CLINIC | Facility: CLINIC | Age: 73
End: 2023-03-02
Payer: MEDICARE

## 2023-03-02 VITALS
HEIGHT: 58.27 IN | BODY MASS INDEX: 23.78 KG/M2 | HEART RATE: 57 BPM | TEMPERATURE: 98 F | SYSTOLIC BLOOD PRESSURE: 138 MMHG | DIASTOLIC BLOOD PRESSURE: 54 MMHG | RESPIRATION RATE: 16 BRPM | WEIGHT: 114.81 LBS | OXYGEN SATURATION: 99 %

## 2023-03-02 DIAGNOSIS — I10 ESSENTIAL HYPERTENSION: ICD-10-CM

## 2023-03-02 DIAGNOSIS — D70.8 OTHER NEUTROPENIA (HCC): ICD-10-CM

## 2023-03-02 DIAGNOSIS — I34.0 NON-RHEUMATIC MITRAL REGURGITATION: ICD-10-CM

## 2023-03-02 DIAGNOSIS — Z23 NEED FOR INFLUENZA VACCINATION: ICD-10-CM

## 2023-03-02 DIAGNOSIS — E03.8 OTHER SPECIFIED HYPOTHYROIDISM: ICD-10-CM

## 2023-03-02 DIAGNOSIS — I25.10 CORONARY ARTERY DISEASE INVOLVING NATIVE CORONARY ARTERY OF NATIVE HEART WITHOUT ANGINA PECTORIS: ICD-10-CM

## 2023-03-02 DIAGNOSIS — M81.0 SENILE OSTEOPOROSIS: ICD-10-CM

## 2023-03-02 DIAGNOSIS — K74.3 PRIMARY BILIARY CIRRHOSIS (HCC): ICD-10-CM

## 2023-03-02 DIAGNOSIS — D69.6 THROMBOCYTOPENIA (HCC): ICD-10-CM

## 2023-03-02 DIAGNOSIS — Z86.19 HX OF CLOSTRIDIUM DIFFICILE INFECTION: ICD-10-CM

## 2023-03-02 DIAGNOSIS — Z00.00 ENCOUNTER FOR ANNUAL HEALTH EXAMINATION: Primary | ICD-10-CM

## 2023-03-02 DIAGNOSIS — Z12.31 ENCOUNTER FOR SCREENING MAMMOGRAM FOR MALIGNANT NEOPLASM OF BREAST: ICD-10-CM

## 2023-03-02 DIAGNOSIS — Z12.83 SCREENING FOR SKIN CANCER: ICD-10-CM

## 2023-03-02 DIAGNOSIS — D47.2 MONOCLONAL GAMMOPATHY OF UNKNOWN SIGNIFICANCE (MGUS): ICD-10-CM

## 2023-03-02 DIAGNOSIS — Z78.0 POST-MENOPAUSAL: ICD-10-CM

## 2023-03-02 DIAGNOSIS — I85.00 ESOPHAGEAL VARICES WITHOUT BLEEDING, UNSPECIFIED ESOPHAGEAL VARICES TYPE (HCC): ICD-10-CM

## 2023-03-02 RX ORDER — LEVOTHYROXINE SODIUM 0.03 MG/1
25 TABLET ORAL
Qty: 90 TABLET | Refills: 3 | Status: SHIPPED | OUTPATIENT
Start: 2023-03-02

## 2023-03-02 RX ORDER — LOSARTAN POTASSIUM 50 MG/1
50 TABLET ORAL DAILY
Qty: 90 TABLET | Refills: 1 | Status: SHIPPED | OUTPATIENT
Start: 2023-03-02

## 2023-03-05 PROBLEM — N95.0 POST-MENOPAUSAL BLEEDING: Status: RESOLVED | Noted: 2018-09-10 | Resolved: 2023-03-05

## 2023-03-05 PROBLEM — I10 BENIGN ESSENTIAL HTN: Status: ACTIVE | Noted: 2018-08-24

## 2023-03-05 PROBLEM — D70.9 NEUTROPENIA (HCC): Status: RESOLVED | Noted: 2020-09-09 | Resolved: 2023-03-05

## 2023-03-05 PROBLEM — Z01.419 ENCOUNTER FOR ANNUAL ROUTINE GYNECOLOGICAL EXAMINATION: Status: RESOLVED | Noted: 2018-09-10 | Resolved: 2023-03-05

## 2023-03-05 PROBLEM — N36.2 URETHRAL CARUNCLE: Status: ACTIVE | Noted: 2023-03-05

## 2023-03-05 PROBLEM — D70.9 NEUTROPENIA: Status: RESOLVED | Noted: 2020-09-09 | Resolved: 2023-03-05

## 2023-03-08 ENCOUNTER — HOSPITAL ENCOUNTER (OUTPATIENT)
Dept: BONE DENSITY | Age: 73
Discharge: HOME | End: 2023-03-08
Attending: INTERNAL MEDICINE
Payer: MEDICARE

## 2023-03-08 ENCOUNTER — HOSPITAL ENCOUNTER (OUTPATIENT)
Dept: MAMMOGRAPHY | Age: 73
Discharge: HOME | End: 2023-03-08
Attending: INTERNAL MEDICINE
Payer: MEDICARE

## 2023-03-08 DIAGNOSIS — Z12.31 ENCOUNTER FOR SCREENING MAMMOGRAM FOR MALIGNANT NEOPLASM OF BREAST: ICD-10-CM

## 2023-03-08 DIAGNOSIS — Z78.0 POST-MENOPAUSAL: ICD-10-CM

## 2023-03-08 PROCEDURE — 77067 SCR MAMMO BI INCL CAD: CPT | Performed by: INTERNAL MEDICINE

## 2023-03-08 PROCEDURE — 77063 BREAST TOMOSYNTHESIS BI: CPT | Performed by: INTERNAL MEDICINE

## 2023-03-08 PROCEDURE — 77080 DXA BONE DENSITY AXIAL: CPT | Performed by: INTERNAL MEDICINE

## 2023-03-09 NOTE — PATIENT INSTRUCTIONS
Barbara Phoenix's SCREENING SCHEDULE   Tests on this list are recommended by your physician but may not be covered, or covered at this frequency, by your insurer. Please check with your insurance carrier before scheduling to verify coverage.    PREVENT 08/27/2018      No recommendations at this time   Pap and Pelvic    Pap   Covered every 2 years for women at normal risk;  Annually if at high risk -  No recommendations at this time    Chlamydia Annually if high risk -  No recommendations at this time   Sc http://www. [place]ph.state. [place]/public/books/advin.htm  A link to the [employer]. This site has a lot of good information including definitions of the different types of Advance Directives.  It also has the State forms available on it's webs Hatchet Flap Text: The defect edges were debeveled with a #15 scalpel blade.  Given the location of the defect, shape of the defect and the proximity to free margins a hatchet flap was deemed most appropriate.  Using a sterile surgical marker, an appropriate hatchet flap was drawn incorporating the defect and placing the expected incisions within the relaxed skin tension lines where possible.    The area thus outlined was incised deep to adipose tissue with a #15 scalpel blade.  The skin margins were undermined to an appropriate distance in all directions utilizing iris scissors.

## 2023-03-13 ENCOUNTER — OFFICE VISIT (OUTPATIENT)
Dept: SURGERY | Facility: CLINIC | Age: 73
End: 2023-03-13

## 2023-03-13 VITALS
DIASTOLIC BLOOD PRESSURE: 72 MMHG | TEMPERATURE: 97 F | HEART RATE: 52 BPM | OXYGEN SATURATION: 100 % | SYSTOLIC BLOOD PRESSURE: 158 MMHG | BODY MASS INDEX: 23.11 KG/M2 | WEIGHT: 111.63 LBS | RESPIRATION RATE: 18 BRPM | HEIGHT: 58.27 IN

## 2023-03-13 DIAGNOSIS — K74.3 PRIMARY BILIARY CIRRHOSIS (HCC): Primary | ICD-10-CM

## 2023-03-13 DIAGNOSIS — K75.4 AUTOIMMUNE HEPATITIS (HCC): ICD-10-CM

## 2023-03-13 RX ORDER — AZATHIOPRINE 50 MG/1
25 TABLET ORAL DAILY
Qty: 45 TABLET | Refills: 3 | Status: SHIPPED | OUTPATIENT
Start: 2023-03-13

## 2023-03-16 ENCOUNTER — TELEPHONE (OUTPATIENT)
Dept: INTERNAL MEDICINE CLINIC | Facility: CLINIC | Age: 73
End: 2023-03-16

## 2023-03-16 NOTE — TELEPHONE ENCOUNTER
Received consult from [address] [place] [address] Place at [place] [address] in  bin for review.

## 2023-03-17 ENCOUNTER — TELEPHONE (OUTPATIENT)
Dept: HEMATOLOGY/ONCOLOGY | Facility: HOSPITAL | Age: 73
End: 2023-03-17

## 2023-03-17 DIAGNOSIS — D47.2 IGM LAMBDA MONOCLONAL GAMMOPATHY: Primary | ICD-10-CM

## 2023-03-20 ENCOUNTER — LAB ENCOUNTER (OUTPATIENT)
Dept: LAB | Facility: HOSPITAL | Age: 73
End: 2023-03-20
Attending: STUDENT IN AN ORGANIZED HEALTH CARE EDUCATION/TRAINING PROGRAM
Payer: MEDICARE

## 2023-03-20 ENCOUNTER — HOSPITAL ENCOUNTER (OUTPATIENT)
Dept: MRI IMAGING | Facility: HOSPITAL | Age: 73
Discharge: HOME | End: 2023-03-20
Attending: STUDENT IN AN ORGANIZED HEALTH CARE EDUCATION/TRAINING PROGRAM
Payer: MEDICARE

## 2023-03-20 DIAGNOSIS — K75.4 AUTOIMMUNE HEPATITIS (HCC): ICD-10-CM

## 2023-03-20 DIAGNOSIS — K74.3 PRIMARY BILIARY CIRRHOSIS (HCC): ICD-10-CM

## 2023-03-20 DIAGNOSIS — D47.2 IGM LAMBDA MONOCLONAL GAMMOPATHY: ICD-10-CM

## 2023-03-20 LAB
ALBUMIN SERPL-MCNC: 3.4 G/DL (ref 3.4–5)
ALBUMIN/GLOB SERPL: 0.8 {RATIO} (ref 1–2)
ALP LIVER SERPL-CCNC: 110 U/L
ALT SERPL-CCNC: 46 U/L
ANION GAP SERPL CALC-SCNC: 0 MMOL/L (ref 0–18)
AST SERPL-CCNC: 80 U/L (ref 15–37)
BASOPHILS # BLD AUTO: 0.02 X10(3) UL (ref 0–0.2)
BASOPHILS NFR BLD AUTO: 0.9 %
BILIRUB SERPL-MCNC: 2 MG/DL (ref 0.1–2)
BUN BLD-MCNC: 18 MG/DL (ref 7–18)
CALCIUM BLD-MCNC: 9.7 MG/DL (ref 8.5–10.1)
CHLORIDE SERPL-SCNC: 109 MMOL/L (ref 98–112)
CO2 SERPL-SCNC: 31 MMOL/L (ref 21–32)
CREAT BLD-MCNC: 1.01 MG/DL
EOSINOPHIL # BLD AUTO: 0.07 X10(3) UL (ref 0–0.7)
EOSINOPHIL NFR BLD AUTO: 3.2 %
ERYTHROCYTE [DISTWIDTH] IN BLOOD BY AUTOMATED COUNT: 11.9 %
FASTING STATUS PATIENT QL REPORTED: YES
GFR SERPLBLD BASED ON 1.73 SQ M-ARVRAT: 58 ML/MIN/1.73M2 (ref 60–?)
GLOBULIN PLAS-MCNC: 4.3 G/DL (ref 2.8–4.4)
GLUCOSE BLD-MCNC: 90 MG/DL (ref 70–99)
HCT VFR BLD AUTO: 39.1 %
HGB BLD-MCNC: 13 G/DL
IMM GRANULOCYTES # BLD AUTO: 0.01 X10(3) UL (ref 0–1)
IMM GRANULOCYTES NFR BLD: 0.5 %
LDH SERPL L TO P-CCNC: 289 U/L
LYMPHOCYTES # BLD AUTO: 0.56 X10(3) UL (ref 1–4)
LYMPHOCYTES NFR BLD AUTO: 25.3 %
MCH RBC QN AUTO: 34.5 PG (ref 26–34)
MCHC RBC AUTO-ENTMCNC: 33.2 G/DL (ref 31–37)
MCV RBC AUTO: 103.7 FL
MONOCYTES # BLD AUTO: 0.26 X10(3) UL (ref 0.1–1)
MONOCYTES NFR BLD AUTO: 11.8 %
NEUTROPHILS # BLD AUTO: 1.29 X10 (3) UL (ref 1.5–7.7)
NEUTROPHILS # BLD AUTO: 1.29 X10(3) UL (ref 1.5–7.7)
NEUTROPHILS NFR BLD AUTO: 58.3 %
OSMOLALITY SERPL CALC.SUM OF ELEC: 291 MOSM/KG (ref 275–295)
PLATELET # BLD AUTO: 94 10(3)UL (ref 150–450)
POTASSIUM SERPL-SCNC: 4.1 MMOL/L (ref 3.5–5.1)
PROT SERPL-MCNC: 7.7 G/DL (ref 6.4–8.2)
RBC # BLD AUTO: 3.77 X10(6)UL
SODIUM SERPL-SCNC: 140 MMOL/L (ref 136–145)
WBC # BLD AUTO: 2.2 X10(3) UL (ref 4–11)

## 2023-03-20 PROCEDURE — 36415 COLL VENOUS BLD VENIPUNCTURE: CPT

## 2023-03-20 PROCEDURE — 85025 COMPLETE CBC W/AUTO DIFF WBC: CPT

## 2023-03-20 PROCEDURE — 83615 LACTATE (LD) (LDH) ENZYME: CPT

## 2023-03-20 PROCEDURE — 74183 MRI ABD W/O CNTR FLWD CNTR: CPT | Performed by: STUDENT IN AN ORGANIZED HEALTH CARE EDUCATION/TRAINING PROGRAM

## 2023-03-20 PROCEDURE — 80053 COMPREHEN METABOLIC PANEL: CPT

## 2023-03-20 PROCEDURE — 80299 QUANTITATIVE ASSAY DRUG: CPT

## 2023-03-20 PROCEDURE — A9581 GADOXETATE DISODIUM INJ: HCPCS

## 2023-03-24 ENCOUNTER — OFFICE VISIT (OUTPATIENT)
Dept: HEMATOLOGY/ONCOLOGY | Facility: HOSPITAL | Age: 73
End: 2023-03-24
Attending: INTERNAL MEDICINE
Payer: MEDICARE

## 2023-03-24 VITALS
DIASTOLIC BLOOD PRESSURE: 72 MMHG | HEART RATE: 57 BPM | RESPIRATION RATE: 16 BRPM | TEMPERATURE: 98 F | SYSTOLIC BLOOD PRESSURE: 164 MMHG | OXYGEN SATURATION: 100 % | BODY MASS INDEX: 22 KG/M2 | WEIGHT: 110.31 LBS

## 2023-03-24 DIAGNOSIS — D69.6 THROMBOCYTOPENIA (HCC): ICD-10-CM

## 2023-03-24 DIAGNOSIS — D47.2 IGM LAMBDA MONOCLONAL GAMMOPATHY: Primary | ICD-10-CM

## 2023-03-24 DIAGNOSIS — D72.819 LEUKOPENIA, UNSPECIFIED TYPE: ICD-10-CM

## 2023-03-24 PROCEDURE — 99213 OFFICE O/P EST LOW 20 MIN: CPT | Performed by: INTERNAL MEDICINE

## 2023-03-24 NOTE — PROGRESS NOTES
Dear Kimberlee Puga,  I hope all is well. I have reviewed your liver labs. Overall fairly stable. There is some increase in your AST liver enzyme. I am reluctant to increase the dose of your Azathioprine due to your lower WBC. We will continue to monitor. I also took a look at your MRI and there were no  suspicious liver findings noted. Further discussion or questions can be addressed at your next appointment with the [facility] or always feel free to contact us.     Rita Gates, APRN  Nurse Practitioner, Hepatology

## 2023-03-24 NOTE — PROGRESS NOTES
Education Record    Learner:  Patient    Disease / Diagnosis: MGUS    Barriers / Limitations:  None   Comments:    Method:  Discussion   Comments:    General Topics:  Plan of care reviewed   Comments:    Outcome:  Shows understanding   Comments:    Patient is here for 6month follow up. Feels good. No pain.

## 2023-03-25 LAB
6-METHYLMERCAPTOPURINE (6-MMP): <399 PMOL/8X10(8)RBC
6-THIOGUANINE (6-GT): 100 PMOL/8X10(8)RBC

## 2023-06-27 ENCOUNTER — APPOINTMENT (OUTPATIENT)
Dept: GENERAL RADIOLOGY | Facility: HOSPITAL | Age: 73
End: 2023-06-27
Attending: EMERGENCY MEDICINE
Payer: MEDICARE

## 2023-06-27 ENCOUNTER — HOSPITAL ENCOUNTER (EMERGENCY)
Facility: HOSPITAL | Age: 73
Discharge: HOME | End: 2023-06-27
Attending: EMERGENCY MEDICINE
Payer: MEDICARE

## 2023-06-27 VITALS
OXYGEN SATURATION: 100 % | DIASTOLIC BLOOD PRESSURE: 55 MMHG | WEIGHT: 115 LBS | SYSTOLIC BLOOD PRESSURE: 117 MMHG | BODY MASS INDEX: 22.58 KG/M2 | TEMPERATURE: 98 F | RESPIRATION RATE: 16 BRPM | HEIGHT: 60 IN | HEART RATE: 59 BPM

## 2023-06-27 DIAGNOSIS — S92.001A CLOSED NONDISPLACED FRACTURE OF RIGHT CALCANEUS, UNSPECIFIED PORTION OF CALCANEUS, INITIAL ENCOUNTER: Primary | ICD-10-CM

## 2023-06-27 DIAGNOSIS — S20.211A CONTUSION OF RIB ON RIGHT SIDE, INITIAL ENCOUNTER: ICD-10-CM

## 2023-06-27 PROCEDURE — 71101 X-RAY EXAM UNILAT RIBS/CHEST: CPT | Performed by: EMERGENCY MEDICINE

## 2023-06-27 PROCEDURE — 99284 EMERGENCY DEPT VISIT MOD MDM: CPT

## 2023-06-27 PROCEDURE — 73630 X-RAY EXAM OF FOOT: CPT | Performed by: EMERGENCY MEDICINE

## 2023-06-27 PROCEDURE — 29515 APPLICATION SHORT LEG SPLINT: CPT

## 2023-06-27 PROCEDURE — 73610 X-RAY EXAM OF ANKLE: CPT | Performed by: EMERGENCY MEDICINE

## 2023-06-27 RX ORDER — TRAMADOL HYDROCHLORIDE 50 MG/1
50 TABLET ORAL EVERY 8 HOURS PRN
Qty: 15 TABLET | Refills: 0 | Status: SHIPPED | OUTPATIENT
Start: 2023-06-27

## 2023-06-27 NOTE — ED QUICK NOTES
Rounding Completed    Plan of Care and results reviewed with patient and son. Tech at bedside placing short leg splint. Bed is locked, Call light within reach.

## 2023-06-27 NOTE — DISCHARGE INSTRUCTIONS
You have a probable small crack in your calcaneal bone on your right foot  Please keep foot in splint until seen by orthopedic md  Use walker at home  Motrin or aleve for pain at home  Return to the ER if symptoms worsen or if any other problems arise

## 2023-06-27 NOTE — ED INITIAL ASSESSMENT (HPI)
Patient reports at 2am she was standing on her sink counter, attempted to get down and hurt the bottom of her right foot when she landed on the ground. Denies hitting head or LOC. Patient was able to get up and attempted more cleaning.   Unable to stand and put pressure on right foot

## 2023-06-28 ENCOUNTER — TELEPHONE (OUTPATIENT)
Dept: ORTHOPEDICS CLINIC | Facility: CLINIC | Age: 73
End: 2023-06-28

## 2023-06-29 ENCOUNTER — PATIENT OUTREACH (OUTPATIENT)
Dept: CASE MANAGEMENT | Age: 73
End: 2023-06-29

## 2023-07-05 ENCOUNTER — OFFICE VISIT (OUTPATIENT)
Dept: ORTHOPEDICS CLINIC | Facility: CLINIC | Age: 73
End: 2023-07-05
Payer: MEDICARE

## 2023-07-05 VITALS — WEIGHT: 115 LBS | BODY MASS INDEX: 22.58 KG/M2 | HEIGHT: 60 IN

## 2023-07-05 DIAGNOSIS — Z91.81 AT RISK FOR FALLS: ICD-10-CM

## 2023-07-05 DIAGNOSIS — M85.80 OSTEOPENIA, UNSPECIFIED LOCATION: ICD-10-CM

## 2023-07-05 DIAGNOSIS — S90.31XA CONTUSION OF FOOT OR HEEL, RIGHT, INITIAL ENCOUNTER: Primary | ICD-10-CM

## 2023-07-05 PROBLEM — S90.30XA CONTUSION OF FOOT OR HEEL: Status: ACTIVE | Noted: 2023-07-05

## 2023-07-05 PROCEDURE — 1125F AMNT PAIN NOTED PAIN PRSNT: CPT | Performed by: PODIATRIST

## 2023-07-05 PROCEDURE — 99204 OFFICE O/P NEW MOD 45 MIN: CPT | Performed by: PODIATRIST

## 2023-07-11 ENCOUNTER — HOSPITAL ENCOUNTER (OUTPATIENT)
Dept: GENERAL RADIOLOGY | Age: 73
Discharge: HOME | End: 2023-07-11
Attending: PODIATRIST
Payer: MEDICARE

## 2023-07-11 ENCOUNTER — LAB ENCOUNTER (OUTPATIENT)
Dept: LAB | Age: 73
End: 2023-07-11
Attending: INTERNAL MEDICINE
Payer: MEDICARE

## 2023-07-11 DIAGNOSIS — K75.4 AUTOIMMUNE HEPATITIS (HCC): ICD-10-CM

## 2023-07-11 DIAGNOSIS — D72.819 LEUKOPENIA, UNSPECIFIED TYPE: ICD-10-CM

## 2023-07-11 DIAGNOSIS — D69.6 THROMBOCYTOPENIA (HCC): ICD-10-CM

## 2023-07-11 DIAGNOSIS — K74.3 PRIMARY BILIARY CIRRHOSIS (HCC): ICD-10-CM

## 2023-07-11 DIAGNOSIS — E03.8 OTHER SPECIFIED HYPOTHYROIDISM: ICD-10-CM

## 2023-07-11 DIAGNOSIS — D47.2 IGM LAMBDA MONOCLONAL GAMMOPATHY: ICD-10-CM

## 2023-07-11 DIAGNOSIS — S90.31XA CONTUSION OF FOOT OR HEEL, RIGHT, INITIAL ENCOUNTER: ICD-10-CM

## 2023-07-11 DIAGNOSIS — M85.80 OSTEOPENIA, UNSPECIFIED LOCATION: ICD-10-CM

## 2023-07-11 LAB
ALBUMIN SERPL-MCNC: 2.8 G/DL (ref 3.4–5)
ALBUMIN/GLOB SERPL: 0.6 {RATIO} (ref 1–2)
ALP LIVER SERPL-CCNC: 198 U/L
ALT SERPL-CCNC: 46 U/L
ANION GAP SERPL CALC-SCNC: 4 MMOL/L (ref 0–18)
AST SERPL-CCNC: 91 U/L (ref 15–37)
BASOPHILS # BLD AUTO: 0.01 X10(3) UL (ref 0–0.2)
BASOPHILS NFR BLD AUTO: 0.4 %
BILIRUB SERPL-MCNC: 1.5 MG/DL (ref 0.1–2)
BUN BLD-MCNC: 14 MG/DL (ref 7–18)
CALCIUM BLD-MCNC: 9.1 MG/DL (ref 8.5–10.1)
CHLORIDE SERPL-SCNC: 106 MMOL/L (ref 98–112)
CO2 SERPL-SCNC: 28 MMOL/L (ref 21–32)
CREAT BLD-MCNC: 1.09 MG/DL
EOSINOPHIL # BLD AUTO: 0.09 X10(3) UL (ref 0–0.7)
EOSINOPHIL NFR BLD AUTO: 3.9 %
ERYTHROCYTE [DISTWIDTH] IN BLOOD BY AUTOMATED COUNT: 13.3 %
FASTING STATUS PATIENT QL REPORTED: NO
GFR SERPLBLD BASED ON 1.73 SQ M-ARVRAT: 53 ML/MIN/1.73M2 (ref 60–?)
GLOBULIN PLAS-MCNC: 4.7 G/DL (ref 2.8–4.4)
GLUCOSE BLD-MCNC: 117 MG/DL (ref 70–99)
HCT VFR BLD AUTO: 36.2 %
HGB BLD-MCNC: 11.9 G/DL
IMM GRANULOCYTES # BLD AUTO: 0.01 X10(3) UL (ref 0–1)
IMM GRANULOCYTES NFR BLD: 0.4 %
LYMPHOCYTES # BLD AUTO: 0.43 X10(3) UL (ref 1–4)
LYMPHOCYTES NFR BLD AUTO: 18.7 %
MCH RBC QN AUTO: 34.6 PG (ref 26–34)
MCHC RBC AUTO-ENTMCNC: 32.9 G/DL (ref 31–37)
MCV RBC AUTO: 105.2 FL
MONOCYTES # BLD AUTO: 0.22 X10(3) UL (ref 0.1–1)
MONOCYTES NFR BLD AUTO: 9.6 %
NEUTROPHILS # BLD AUTO: 1.54 X10 (3) UL (ref 1.5–7.7)
NEUTROPHILS # BLD AUTO: 1.54 X10(3) UL (ref 1.5–7.7)
NEUTROPHILS NFR BLD AUTO: 67 %
OSMOLALITY SERPL CALC.SUM OF ELEC: 288 MOSM/KG (ref 275–295)
PLATELET # BLD AUTO: 104 10(3)UL (ref 150–450)
POTASSIUM SERPL-SCNC: 4.3 MMOL/L (ref 3.5–5.1)
PROT SERPL-MCNC: 7.5 G/DL (ref 6.4–8.2)
RBC # BLD AUTO: 3.44 X10(6)UL
SODIUM SERPL-SCNC: 138 MMOL/L (ref 136–145)
T4 FREE SERPL-MCNC: 1.1 NG/DL (ref 0.8–1.7)
TSI SER-ACNC: 2.12 MIU/ML (ref 0.36–3.74)
WBC # BLD AUTO: 2.3 X10(3) UL (ref 4–11)

## 2023-07-11 PROCEDURE — 84443 ASSAY THYROID STIM HORMONE: CPT

## 2023-07-11 PROCEDURE — 80053 COMPREHEN METABOLIC PANEL: CPT

## 2023-07-11 PROCEDURE — 73650 X-RAY EXAM OF HEEL: CPT | Performed by: PODIATRIST

## 2023-07-11 PROCEDURE — 85025 COMPLETE CBC W/AUTO DIFF WBC: CPT

## 2023-07-11 PROCEDURE — 36415 COLL VENOUS BLD VENIPUNCTURE: CPT

## 2023-07-11 PROCEDURE — 84439 ASSAY OF FREE THYROXINE: CPT

## 2023-08-02 ENCOUNTER — LAB ENCOUNTER (OUTPATIENT)
Dept: LAB | Facility: HOSPITAL | Age: 73
End: 2023-08-02
Attending: STUDENT IN AN ORGANIZED HEALTH CARE EDUCATION/TRAINING PROGRAM
Payer: MEDICARE

## 2023-08-02 DIAGNOSIS — K74.5 BILIARY CIRRHOSIS, UNSPECIFIED (HCC): ICD-10-CM

## 2023-08-02 DIAGNOSIS — K74.00 HEPATIC FIBROSIS, UNSPECIFIED: ICD-10-CM

## 2023-08-02 DIAGNOSIS — K74.3 PRIMARY BILIARY CIRRHOSIS (HCC): ICD-10-CM

## 2023-08-02 LAB
AFP-TM SERPL-MCNC: 6.8 NG/ML (ref ?–8)
INR BLD: 1.22 (ref 0.85–1.16)
PROTHROMBIN TIME: 15.4 SECONDS (ref 11.6–14.8)

## 2023-08-02 PROCEDURE — 36415 COLL VENOUS BLD VENIPUNCTURE: CPT

## 2023-08-02 PROCEDURE — 82105 ALPHA-FETOPROTEIN SERUM: CPT

## 2023-08-02 PROCEDURE — 85610 PROTHROMBIN TIME: CPT

## 2023-09-12 ENCOUNTER — HOSPITAL ENCOUNTER (OUTPATIENT)
Dept: MRI IMAGING | Age: 73
Discharge: HOME | End: 2023-09-12
Attending: STUDENT IN AN ORGANIZED HEALTH CARE EDUCATION/TRAINING PROGRAM
Payer: MEDICARE

## 2023-09-12 ENCOUNTER — LAB ENCOUNTER (OUTPATIENT)
Dept: LAB | Age: 73
End: 2023-09-12
Attending: STUDENT IN AN ORGANIZED HEALTH CARE EDUCATION/TRAINING PROGRAM
Payer: MEDICARE

## 2023-09-12 DIAGNOSIS — K75.4 AUTOIMMUNE HEPATITIS (HCC): ICD-10-CM

## 2023-09-12 DIAGNOSIS — D72.819 LEUKOPENIA, UNSPECIFIED TYPE: ICD-10-CM

## 2023-09-12 DIAGNOSIS — D69.6 THROMBOCYTOPENIA (HCC): ICD-10-CM

## 2023-09-12 DIAGNOSIS — K74.3 PRIMARY BILIARY CIRRHOSIS (HCC): ICD-10-CM

## 2023-09-12 DIAGNOSIS — D47.2 IGM LAMBDA MONOCLONAL GAMMOPATHY: ICD-10-CM

## 2023-09-12 LAB
ALBUMIN SERPL-MCNC: 3.1 G/DL (ref 3.4–5)
ALBUMIN/GLOB SERPL: 0.6 {RATIO} (ref 1–2)
ALP LIVER SERPL-CCNC: 140 U/L
ALT SERPL-CCNC: 25 U/L
ANION GAP SERPL CALC-SCNC: 6 MMOL/L (ref 0–18)
AST SERPL-CCNC: 56 U/L (ref 15–37)
BASOPHILS # BLD AUTO: 0.02 X10(3) UL (ref 0–0.2)
BASOPHILS NFR BLD AUTO: 1.1 %
BILIRUB SERPL-MCNC: 2.8 MG/DL (ref 0.1–2)
BUN BLD-MCNC: 18 MG/DL (ref 7–18)
CALCIUM BLD-MCNC: 9.8 MG/DL (ref 8.5–10.1)
CHLORIDE SERPL-SCNC: 111 MMOL/L (ref 98–112)
CO2 SERPL-SCNC: 23 MMOL/L (ref 21–32)
CREAT BLD-MCNC: 1.03 MG/DL
EGFRCR SERPLBLD CKD-EPI 2021: 57 ML/MIN/1.73M2 (ref 60–?)
EOSINOPHIL # BLD AUTO: 0.08 X10(3) UL (ref 0–0.7)
EOSINOPHIL NFR BLD AUTO: 4.3 %
ERYTHROCYTE [DISTWIDTH] IN BLOOD BY AUTOMATED COUNT: 13.8 %
FASTING STATUS PATIENT QL REPORTED: YES
GLOBULIN PLAS-MCNC: 4.9 G/DL (ref 2.8–4.4)
GLUCOSE BLD-MCNC: 86 MG/DL (ref 70–99)
HCT VFR BLD AUTO: 37 %
HGB BLD-MCNC: 12.8 G/DL
IGA SERPL-MCNC: 536 MG/DL (ref 70–312)
IGM SERPL-MCNC: 346 MG/DL (ref 43–279)
IMM GRANULOCYTES # BLD AUTO: 0 X10(3) UL (ref 0–1)
IMM GRANULOCYTES NFR BLD: 0 %
IMMUNOGLOBULIN PNL SER-MCNC: 2320 MG/DL (ref 791–1643)
LYMPHOCYTES # BLD AUTO: 0.44 X10(3) UL (ref 1–4)
LYMPHOCYTES NFR BLD AUTO: 23.7 %
MCH RBC QN AUTO: 35.7 PG (ref 26–34)
MCHC RBC AUTO-ENTMCNC: 34.6 G/DL (ref 31–37)
MCV RBC AUTO: 103.1 FL
MONOCYTES # BLD AUTO: 0.2 X10(3) UL (ref 0.1–1)
MONOCYTES NFR BLD AUTO: 10.8 %
NEUTROPHILS # BLD AUTO: 1.12 X10 (3) UL (ref 1.5–7.7)
NEUTROPHILS # BLD AUTO: 1.12 X10(3) UL (ref 1.5–7.7)
NEUTROPHILS NFR BLD AUTO: 60.1 %
OSMOLALITY SERPL CALC.SUM OF ELEC: 291 MOSM/KG (ref 275–295)
PLATELET # BLD AUTO: 93 10(3)UL (ref 150–450)
POTASSIUM SERPL-SCNC: 3.9 MMOL/L (ref 3.5–5.1)
PROT SERPL-MCNC: 8 G/DL (ref 6.4–8.2)
RBC # BLD AUTO: 3.59 X10(6)UL
SODIUM SERPL-SCNC: 140 MMOL/L (ref 136–145)
WBC # BLD AUTO: 1.9 X10(3) UL (ref 4–11)

## 2023-09-12 PROCEDURE — A9581 GADOXETATE DISODIUM INJ: HCPCS | Performed by: STUDENT IN AN ORGANIZED HEALTH CARE EDUCATION/TRAINING PROGRAM

## 2023-09-12 PROCEDURE — 84165 PROTEIN E-PHORESIS SERUM: CPT

## 2023-09-12 PROCEDURE — 83521 IG LIGHT CHAINS FREE EACH: CPT

## 2023-09-12 PROCEDURE — 86334 IMMUNOFIX E-PHORESIS SERUM: CPT

## 2023-09-12 PROCEDURE — 74183 MRI ABD W/O CNTR FLWD CNTR: CPT | Performed by: STUDENT IN AN ORGANIZED HEALTH CARE EDUCATION/TRAINING PROGRAM

## 2023-09-12 PROCEDURE — 36415 COLL VENOUS BLD VENIPUNCTURE: CPT

## 2023-09-12 PROCEDURE — 85025 COMPLETE CBC W/AUTO DIFF WBC: CPT

## 2023-09-12 PROCEDURE — 80053 COMPREHEN METABOLIC PANEL: CPT

## 2023-09-19 LAB
ALBUMIN SERPL ELPH-MCNC: 3.45 G/DL (ref 3.75–5.21)
ALBUMIN/GLOB SERPL: 0.79 {RATIO} (ref 1–2)
ALPHA1 GLOB SERPL ELPH-MCNC: 0.27 G/DL (ref 0.19–0.46)
ALPHA2 GLOB SERPL ELPH-MCNC: 0.52 G/DL (ref 0.48–1.05)
B-GLOBULIN SERPL ELPH-MCNC: 0.92 G/DL (ref 0.68–1.23)
GAMMA GLOB SERPL ELPH-MCNC: 2.64 G/DL (ref 0.62–1.7)
KAPPA LC FREE SER-MCNC: 8.83 MG/DL (ref 0.33–1.94)
KAPPA LC FREE/LAMBDA FREE SER NEPH: 1.33 {RATIO} (ref 0.26–1.65)
LAMBDA LC FREE SERPL-MCNC: 6.63 MG/DL (ref 0.57–2.63)
PROT SERPL-MCNC: 7.8 G/DL (ref 6.4–8.2)

## 2023-09-22 ENCOUNTER — APPOINTMENT (OUTPATIENT)
Dept: HEMATOLOGY/ONCOLOGY | Facility: HOSPITAL | Age: 73
End: 2023-09-22
Attending: INTERNAL MEDICINE
Payer: MEDICARE

## 2023-09-29 ENCOUNTER — APPOINTMENT (OUTPATIENT)
Dept: HEMATOLOGY/ONCOLOGY | Facility: HOSPITAL | Age: 73
End: 2023-09-29
Attending: INTERNAL MEDICINE
Payer: MEDICARE

## 2023-10-13 ENCOUNTER — APPOINTMENT (OUTPATIENT)
Dept: HEMATOLOGY/ONCOLOGY | Facility: HOSPITAL | Age: 73
End: 2023-10-13
Attending: INTERNAL MEDICINE
Payer: MEDICARE

## 2023-10-20 DIAGNOSIS — I10 ESSENTIAL HYPERTENSION: ICD-10-CM

## 2023-10-23 ENCOUNTER — OFFICE VISIT (OUTPATIENT)
Dept: HEMATOLOGY/ONCOLOGY | Facility: HOSPITAL | Age: 73
End: 2023-10-23
Attending: INTERNAL MEDICINE

## 2023-10-23 VITALS
HEART RATE: 62 BPM | SYSTOLIC BLOOD PRESSURE: 137 MMHG | BODY MASS INDEX: 21 KG/M2 | TEMPERATURE: 98 F | WEIGHT: 108.5 LBS | RESPIRATION RATE: 16 BRPM | OXYGEN SATURATION: 99 % | DIASTOLIC BLOOD PRESSURE: 72 MMHG

## 2023-10-23 DIAGNOSIS — D47.2 MGUS (MONOCLONAL GAMMOPATHY OF UNKNOWN SIGNIFICANCE): ICD-10-CM

## 2023-10-23 DIAGNOSIS — I10 ESSENTIAL HYPERTENSION: ICD-10-CM

## 2023-10-23 DIAGNOSIS — D72.819 LEUKOPENIA, UNSPECIFIED TYPE: Primary | ICD-10-CM

## 2023-10-23 LAB
BASOPHILS # BLD AUTO: 0.01 X10(3) UL (ref 0–0.2)
BASOPHILS NFR BLD AUTO: 0.6 %
EOSINOPHIL # BLD AUTO: 0.07 X10(3) UL (ref 0–0.7)
EOSINOPHIL NFR BLD AUTO: 4.3 %
ERYTHROCYTE [DISTWIDTH] IN BLOOD BY AUTOMATED COUNT: 13 %
HCT VFR BLD AUTO: 35.5 %
HGB BLD-MCNC: 12.3 G/DL
IMM GRANULOCYTES # BLD AUTO: 0.01 X10(3) UL (ref 0–1)
IMM GRANULOCYTES NFR BLD: 0.6 %
LYMPHOCYTES # BLD AUTO: 0.33 X10(3) UL (ref 1–4)
LYMPHOCYTES NFR BLD AUTO: 20.4 %
MCH RBC QN AUTO: 35.8 PG (ref 26–34)
MCHC RBC AUTO-ENTMCNC: 34.6 G/DL (ref 31–37)
MCV RBC AUTO: 103.2 FL
MONOCYTES # BLD AUTO: 0.14 X10(3) UL (ref 0.1–1)
MONOCYTES NFR BLD AUTO: 8.6 %
NEUTROPHILS # BLD AUTO: 1.06 X10 (3) UL (ref 1.5–7.7)
NEUTROPHILS # BLD AUTO: 1.06 X10(3) UL (ref 1.5–7.7)
NEUTROPHILS NFR BLD AUTO: 65.5 %
PLATELET # BLD AUTO: 84 10(3)UL (ref 150–450)
RBC # BLD AUTO: 3.44 X10(6)UL
WBC # BLD AUTO: 1.6 X10(3) UL (ref 4–11)

## 2023-10-23 PROCEDURE — 99214 OFFICE O/P EST MOD 30 MIN: CPT | Performed by: INTERNAL MEDICINE

## 2023-10-23 RX ORDER — LOSARTAN POTASSIUM 50 MG/1
50 TABLET ORAL DAILY
Qty: 30 TABLET | Refills: 0 | Status: SHIPPED | OUTPATIENT
Start: 2023-10-23

## 2023-10-23 NOTE — PROGRESS NOTES
Pt here for follow up. She states she had chest wall discomfort for a few days. She states she has previously  had this discomfort. No pain or discomfort today.     Outpatient Oncology Care Plan  Problem list:  knowledge deficit    Problems related to:    disease/disease progression    Interventions:  provided general teaching    Expected outcomes:  understands plan of care    Progress towards outcome:  making progress    Education Record    Learner:  Patient  Barriers / Limitations:  None  Method:  Brief focused  Outcome:  Shows understanding  Comments:

## 2023-10-24 RX ORDER — LOSARTAN POTASSIUM 50 MG/1
50 TABLET ORAL DAILY
Qty: 90 TABLET | Refills: 0 | Status: SHIPPED | OUTPATIENT
Start: 2023-10-24

## 2023-10-24 NOTE — TELEPHONE ENCOUNTER
Hypertension Medications Protocol Hztsds29/23/2023 07:16 PM   Protocol Details Appointment in past 6 or next 3 months    CMP or BMP in past 12 months    Last serum creatinine< 2.0          Last visit was annual 3/2023 with TB     Return in about 6 months (around 9/2/2023) for follow up chronic issues.      No future visits scheduled

## 2023-10-24 NOTE — TELEPHONE ENCOUNTER
No apt  Rts 6 mo  No apt  Last labs 9/12/23  Rx'd 3/2/23  Mychart msg sent to pt to [facility] apt  #30 sent to pharm to hold until apt

## 2024-03-19 ENCOUNTER — LAB ENCOUNTER (OUTPATIENT)
Dept: LAB | Facility: HOSPITAL | Age: 74
End: 2024-03-19
Attending: STUDENT IN AN ORGANIZED HEALTH CARE EDUCATION/TRAINING PROGRAM
Payer: MEDICARE

## 2024-03-19 ENCOUNTER — HOSPITAL ENCOUNTER (OUTPATIENT)
Dept: MRI IMAGING | Facility: HOSPITAL | Age: 74
Discharge: HOME | End: 2024-03-19
Attending: STUDENT IN AN ORGANIZED HEALTH CARE EDUCATION/TRAINING PROGRAM
Payer: MEDICARE

## 2024-03-19 DIAGNOSIS — D47.2 IGM LAMBDA MONOCLONAL GAMMOPATHY: ICD-10-CM

## 2024-03-19 DIAGNOSIS — K74.3 PRIMARY BILIARY CIRRHOSIS (HCC): ICD-10-CM

## 2024-03-19 DIAGNOSIS — D69.6 THROMBOCYTOPENIA (HCC): ICD-10-CM

## 2024-03-19 DIAGNOSIS — K74.3 CHOLANGITIC CIRRHOSIS (HCC): ICD-10-CM

## 2024-03-19 DIAGNOSIS — K74.00 LIVER FIBROSIS: ICD-10-CM

## 2024-03-19 DIAGNOSIS — D72.819 LEUKOPENIA, UNSPECIFIED TYPE: ICD-10-CM

## 2024-03-19 LAB
AFP-TM SERPL-MCNC: 7.1 NG/ML (ref ?–8)
ALBUMIN SERPL-MCNC: 2.9 G/DL (ref 3.4–5)
ALBUMIN/GLOB SERPL: 0.6 {RATIO} (ref 1–2)
ALP LIVER SERPL-CCNC: 156 U/L
ALT SERPL-CCNC: 37 U/L
ANION GAP SERPL CALC-SCNC: 2 MMOL/L (ref 0–18)
AST SERPL-CCNC: 65 U/L (ref 15–37)
BASOPHILS # BLD AUTO: 0.02 X10(3) UL (ref 0–0.2)
BASOPHILS NFR BLD AUTO: 0.9 %
BILIRUB SERPL-MCNC: 2.1 MG/DL (ref 0.1–2)
BUN BLD-MCNC: 20 MG/DL (ref 9–23)
CALCIUM BLD-MCNC: 9.2 MG/DL (ref 8.5–10.1)
CHLORIDE SERPL-SCNC: 109 MMOL/L (ref 98–112)
CO2 SERPL-SCNC: 27 MMOL/L (ref 21–32)
CREAT BLD-MCNC: 1.1 MG/DL
EGFRCR SERPLBLD CKD-EPI 2021: 52 ML/MIN/1.73M2 (ref 60–?)
EOSINOPHIL # BLD AUTO: 0.1 X10(3) UL (ref 0–0.7)
EOSINOPHIL NFR BLD AUTO: 4.6 %
ERYTHROCYTE [DISTWIDTH] IN BLOOD BY AUTOMATED COUNT: 13.7 %
FASTING STATUS PATIENT QL REPORTED: YES
GLOBULIN PLAS-MCNC: 4.8 G/DL (ref 2.8–4.4)
GLUCOSE BLD-MCNC: 95 MG/DL (ref 70–99)
HCT VFR BLD AUTO: 36.1 %
HGB BLD-MCNC: 12.2 G/DL
IMM GRANULOCYTES # BLD AUTO: 0 X10(3) UL (ref 0–1)
IMM GRANULOCYTES NFR BLD: 0 %
LYMPHOCYTES # BLD AUTO: 0.47 X10(3) UL (ref 1–4)
LYMPHOCYTES NFR BLD AUTO: 21.5 %
MCH RBC QN AUTO: 34.8 PG (ref 26–34)
MCHC RBC AUTO-ENTMCNC: 33.8 G/DL (ref 31–37)
MCV RBC AUTO: 102.8 FL
MONOCYTES # BLD AUTO: 0.32 X10(3) UL (ref 0.1–1)
MONOCYTES NFR BLD AUTO: 14.6 %
NEUTROPHILS # BLD AUTO: 1.28 X10 (3) UL (ref 1.5–7.7)
NEUTROPHILS # BLD AUTO: 1.28 X10(3) UL (ref 1.5–7.7)
NEUTROPHILS NFR BLD AUTO: 58.4 %
OSMOLALITY SERPL CALC.SUM OF ELEC: 288 MOSM/KG (ref 275–295)
PLATELET # BLD AUTO: 93 10(3)UL (ref 150–450)
PLATELETS.RETICULATED NFR BLD AUTO: 6.4 % (ref 0–7)
POTASSIUM SERPL-SCNC: 3.6 MMOL/L (ref 3.5–5.1)
PROT SERPL-MCNC: 7.7 G/DL (ref 6.4–8.2)
RBC # BLD AUTO: 3.51 X10(6)UL
SODIUM SERPL-SCNC: 138 MMOL/L (ref 136–145)
WBC # BLD AUTO: 2.2 X10(3) UL (ref 4–11)

## 2024-03-19 PROCEDURE — 85025 COMPLETE CBC W/AUTO DIFF WBC: CPT

## 2024-03-19 PROCEDURE — A9581 GADOXETATE DISODIUM INJ: HCPCS | Performed by: STUDENT IN AN ORGANIZED HEALTH CARE EDUCATION/TRAINING PROGRAM

## 2024-03-19 PROCEDURE — 80053 COMPREHEN METABOLIC PANEL: CPT

## 2024-03-19 PROCEDURE — 36415 COLL VENOUS BLD VENIPUNCTURE: CPT

## 2024-03-19 PROCEDURE — 82105 ALPHA-FETOPROTEIN SERUM: CPT

## 2024-03-19 PROCEDURE — 74183 MRI ABD W/O CNTR FLWD CNTR: CPT | Performed by: STUDENT IN AN ORGANIZED HEALTH CARE EDUCATION/TRAINING PROGRAM

## 2024-03-22 NOTE — PROGRESS NOTES
Mamta,    Stable findings on your MRI and labs.  No changes at this time from my standpoint.  Please follow-up in office, as it has been almost 1 year since your last visit.  Please call with any questions,    Lazaro Lewis MD

## 2024-04-19 ENCOUNTER — TELEPHONE (OUTPATIENT)
Dept: HEMATOLOGY/ONCOLOGY | Facility: HOSPITAL | Age: 74
End: 2024-04-19

## 2024-04-19 DIAGNOSIS — D47.2 MGUS (MONOCLONAL GAMMOPATHY OF UNKNOWN SIGNIFICANCE): ICD-10-CM

## 2024-04-19 DIAGNOSIS — D72.819 LEUKOPENIA, UNSPECIFIED TYPE: Primary | ICD-10-CM

## 2024-04-19 NOTE — TELEPHONE ENCOUNTER
Patient calling to request orders for her labs as she has her 6 month f/u this coming Monday 4/22/24 and according to her she contact Raffi to have this done but there is no order at this time. Please call to advise.

## 2024-04-20 ENCOUNTER — LAB ENCOUNTER (OUTPATIENT)
Dept: LAB | Facility: HOSPITAL | Age: 74
End: 2024-04-20
Attending: INTERNAL MEDICINE
Payer: MEDICARE

## 2024-04-20 DIAGNOSIS — D47.2 MGUS (MONOCLONAL GAMMOPATHY OF UNKNOWN SIGNIFICANCE): ICD-10-CM

## 2024-04-20 DIAGNOSIS — D72.819 LEUKOPENIA, UNSPECIFIED TYPE: ICD-10-CM

## 2024-04-20 LAB
ALBUMIN SERPL-MCNC: 2.9 G/DL (ref 3.4–5)
ALBUMIN/GLOB SERPL: 0.6 {RATIO} (ref 1–2)
ALP LIVER SERPL-CCNC: 152 U/L
ALT SERPL-CCNC: 36 U/L
ANION GAP SERPL CALC-SCNC: 7 MMOL/L (ref 0–18)
AST SERPL-CCNC: 73 U/L (ref 15–37)
BASOPHILS # BLD AUTO: 0.02 X10(3) UL (ref 0–0.2)
BASOPHILS NFR BLD AUTO: 1 %
BILIRUB SERPL-MCNC: 2.1 MG/DL (ref 0.1–2)
BUN BLD-MCNC: 18 MG/DL (ref 9–23)
CALCIUM BLD-MCNC: 8.8 MG/DL (ref 8.5–10.1)
CHLORIDE SERPL-SCNC: 107 MMOL/L (ref 98–112)
CO2 SERPL-SCNC: 26 MMOL/L (ref 21–32)
CREAT BLD-MCNC: 0.97 MG/DL
EGFRCR SERPLBLD CKD-EPI 2021: 61 ML/MIN/1.73M2 (ref 60–?)
EOSINOPHIL # BLD AUTO: 0.08 X10(3) UL (ref 0–0.7)
EOSINOPHIL NFR BLD AUTO: 4.2 %
ERYTHROCYTE [DISTWIDTH] IN BLOOD BY AUTOMATED COUNT: 12.8 %
FASTING STATUS PATIENT QL REPORTED: YES
GLOBULIN PLAS-MCNC: 4.8 G/DL (ref 2.8–4.4)
GLUCOSE BLD-MCNC: 90 MG/DL (ref 70–99)
HCT VFR BLD AUTO: 36.9 %
HGB BLD-MCNC: 12.5 G/DL
IGA SERPL-MCNC: 507 MG/DL (ref 70–312)
IGM SERPL-MCNC: 319 MG/DL (ref 43–279)
IMM GRANULOCYTES # BLD AUTO: 0.01 X10(3) UL (ref 0–1)
IMM GRANULOCYTES NFR BLD: 0.5 %
IMMUNOGLOBULIN PNL SER-MCNC: 2320 MG/DL (ref 791–1643)
LYMPHOCYTES # BLD AUTO: 0.51 X10(3) UL (ref 1–4)
LYMPHOCYTES NFR BLD AUTO: 26.6 %
MCH RBC QN AUTO: 34.8 PG (ref 26–34)
MCHC RBC AUTO-ENTMCNC: 33.9 G/DL (ref 31–37)
MCV RBC AUTO: 102.8 FL
MONOCYTES # BLD AUTO: 0.28 X10(3) UL (ref 0.1–1)
MONOCYTES NFR BLD AUTO: 14.6 %
NEUTROPHILS # BLD AUTO: 1.02 X10 (3) UL (ref 1.5–7.7)
NEUTROPHILS # BLD AUTO: 1.02 X10(3) UL (ref 1.5–7.7)
NEUTROPHILS NFR BLD AUTO: 53.1 %
OSMOLALITY SERPL CALC.SUM OF ELEC: 291 MOSM/KG (ref 275–295)
PLATELET # BLD AUTO: 79 10(3)UL (ref 150–450)
PLATELETS.RETICULATED NFR BLD AUTO: 7.4 % (ref 0–7)
POTASSIUM SERPL-SCNC: 3.8 MMOL/L (ref 3.5–5.1)
PROT SERPL-MCNC: 7.7 G/DL (ref 6.4–8.2)
RBC # BLD AUTO: 3.59 X10(6)UL
SODIUM SERPL-SCNC: 140 MMOL/L (ref 136–145)
WBC # BLD AUTO: 1.9 X10(3) UL (ref 4–11)

## 2024-04-20 PROCEDURE — 84165 PROTEIN E-PHORESIS SERUM: CPT

## 2024-04-20 PROCEDURE — 36415 COLL VENOUS BLD VENIPUNCTURE: CPT

## 2024-04-20 PROCEDURE — 86334 IMMUNOFIX E-PHORESIS SERUM: CPT

## 2024-04-20 PROCEDURE — 80053 COMPREHEN METABOLIC PANEL: CPT

## 2024-04-20 PROCEDURE — 83521 IG LIGHT CHAINS FREE EACH: CPT

## 2024-04-20 PROCEDURE — 85025 COMPLETE CBC W/AUTO DIFF WBC: CPT

## 2024-04-20 PROCEDURE — 82784 ASSAY IGA/IGD/IGG/IGM EACH: CPT

## 2024-04-22 ENCOUNTER — HOSPITAL ENCOUNTER (OUTPATIENT)
Dept: GENERAL RADIOLOGY | Facility: HOSPITAL | Age: 74
Discharge: HOME | End: 2024-04-22
Attending: INTERNAL MEDICINE
Payer: MEDICARE

## 2024-04-22 ENCOUNTER — OFFICE VISIT (OUTPATIENT)
Dept: HEMATOLOGY/ONCOLOGY | Facility: HOSPITAL | Age: 74
End: 2024-04-22
Attending: INTERNAL MEDICINE
Payer: MEDICARE

## 2024-04-22 VITALS
RESPIRATION RATE: 16 BRPM | HEART RATE: 73 BPM | SYSTOLIC BLOOD PRESSURE: 145 MMHG | OXYGEN SATURATION: 99 % | WEIGHT: 116.38 LBS | TEMPERATURE: 97 F | DIASTOLIC BLOOD PRESSURE: 69 MMHG | BODY MASS INDEX: 23 KG/M2

## 2024-04-22 DIAGNOSIS — D47.2 MONOCLONAL GAMMOPATHY: Primary | ICD-10-CM

## 2024-04-22 DIAGNOSIS — R07.81 RIB PAIN: ICD-10-CM

## 2024-04-22 DIAGNOSIS — D47.2 MONOCLONAL GAMMOPATHY: ICD-10-CM

## 2024-04-22 PROCEDURE — 99214 OFFICE O/P EST MOD 30 MIN: CPT | Performed by: INTERNAL MEDICINE

## 2024-04-22 PROCEDURE — 71101 X-RAY EXAM UNILAT RIBS/CHEST: CPT | Performed by: INTERNAL MEDICINE

## 2024-04-22 RX ORDER — FUROSEMIDE 40 MG/1
40 TABLET ORAL DAILY
COMMUNITY
Start: 2024-04-12 | End: 2025-04-12

## 2024-04-22 NOTE — PROGRESS NOTES
Cancer Center Progress Note  Patient Name: Barbara Phoenix   YOB: 1950   Medical Record Number: [id number]     Attending Physician: Ruslan Clay M.D.       Date of Visit: 4/22/2024        Chief Complaint:  Chief Complaint   Patient presents with    Follow - Up        Oncologic History:  Barbara Phoenix is a 73 year old female referred by Dr. Ruggiero and Dr. Oh for evaluation of her leukopenia and thrombocytopenia.  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  She has chronic splenomegaly and has had chronic leukopenia.  Over the past few year, she has developed thrombocytopenia, as well.  She was seen by Dr. Oh, hepatologist, a few days ago who noted that her leukopenia and thrombocytopenia are somewhat lower than would be expected for the severity of her liver disease and splenomegaly.  A monoclonal protein study obtained in June had demonstrated a polyclonal elevation of her free light chains, but with a slightly elevated K:L ratio. Her creatinine was normal. She denies bone pain. No F/C/NS.  No palpable LAD.  No wt loss.   She has long standing osteoporosis and had a \"stress\" fracture of the ankle in 2014.      History of Present Illness:  Pt is here for follow up. She feels well, but over the past few weeks, she has noted pain in the R ribs. She does not recall an injury. The pain resolves with rest, but is exacerbated by movement. She is unable to replicate the pain with palpation.    Performance Status:  ECOG 0    Past Medical History:  Past Medical History:    Abnormal TSH    \"borderline\"    Arthritis    Arthritis    hands    Atypical mole    Back pain    Bad breath    Blood disorder    thrombocytopenia and leukopenia    Blood in urine    Blurred vision    Blurred vision. Cataracts per MD.    Cholelithiasis    Clostridium difficile diarrhea    Colitis    Disorder of thyroid    Dizziness    Dysuria    Easy bruising    Esophageal  reflux    Fatigue    Occasional    Frequent UTI    4 UTI’s in last 2 years    Gastritis    Heart murmur    Heart palpitations    Heart valve disease    Hemorrhoids    High blood pressure    History of cardiac murmur    Internal hemorrhoids    Leg swelling    Leukopenia    Nausea    Osteoporosis    Pain in joints    Hands, back, hips.    Primary biliary cirrhosis (HCC)    primary biliary cirrhosis    Skin lesion    shoulder, intradermal melanocytic nevus w/congenital features    Visual impairment    glasses    Wears glasses    Weight gain    Sudden onset of rapid weight gain (9-10 lbs.)       Past Surgical History:  Past Surgical History:   Procedure Laterality Date    Angiogram      Colonoscopy  2010    Colonoscopy      Colonoscopy N/A 2018    Procedure: COLONOSCOPY;  Surgeon: Vinicio Delgado MD;  Location:  ENDOSCOPY    [facility]      Needle biopsy liver        , ,      Tonsillectomy      as a child    Upper gi endoscopy,exam  2010       Family History:  Family History   Problem Relation Age of Onset    Heart Disorder Father     Hypertension Father     Heart Surgery Father 74        CABG    Other (heart disease) Father     Other (htn) Father     Other (cabg) Father     Other (copd) Mother         not a smoker    Other (osteopenia) Mother     Other (hemochromatosis) Mother     Other (AAA) Mother     Diabetes Paternal Grandmother     Diabetes Maternal Grandfather     Heart Disorder Paternal Grandfather     Heart Disease Other     Cancer Neg     Stroke Neg        Social History:  Social History     Socioeconomic History    Marital status:      Spouse name: Not on file    Number of children: Not on file    Years of education: Not on file    Highest education level: Not on file   Occupational History    Not on file   Tobacco Use    Smoking status: Former     Current packs/day: 0.00     Types: Cigarettes    Smokeless tobacco: Never    Tobacco comments:     Very light smoker my 20's  & late 30's   Vaping Use    Vaping status: Never Used   Substance and Sexual Activity    Alcohol use: Not Currently     Comment: Rare; 3-4 drinks a YEAR.    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     Social Determinants of Health     Financial Resource Strain: Not on file   Food Insecurity: Not on file   Transportation Needs: Not on file   Physical Activity: Not on file   Stress: Not on file   Social Connections: Not on file   Housing Stability: Not on file       Current Medications:    Current Outpatient Medications:     Ursodiol 500 MG Oral Tab, Take 1.5 tablets (750 mg total) by mouth daily., Disp: 135 tablet, Rfl: 3    losartan 50 MG Oral Tab, Take 1 tablet (50 mg total) by mouth daily., Disp: 90 tablet, Rfl: 0    traMADol 50 MG Oral Tab, Take 1 tablet (50 mg total) by mouth every 8 (eight) hours as needed for Pain., Disp: 15 tablet, Rfl: 0    azaTHIOprine 50 MG Oral Tab, Take 0.5 tablets (25 mg total) by mouth daily., Disp: 45 tablet, Rfl: 3    OMEPRAZOLE 20 MG Oral Capsule Delayed Release, TAKE ONE CAPSULE BY MOUTH 30 MINUTES BEFORE DINNER., Disp: 90 capsule, Rfl: 3    levothyroxine 25 MCG Oral Tab, Take 1 tablet (25 mcg total) by mouth before breakfast., Disp: 90 tablet, Rfl: 3    estradiol (ESTRACE) 0.1 MG/GM Vaginal Cream, Apply small pea sized amount vaginally nightly x 2 weeks then afterward 3 times weekly, Disp: 1 each, Rfl: 3    Cholecalciferol (VITAMIN D-3 OR), Take 3,000  Units by mouth daily., Disp: , Rfl:     Vitamin B-12 1000 MCG Oral Tab, Take 1 tablet (1,000 mcg total) by mouth daily., Disp: , Rfl:     aspirin 81 MG Oral Tab, Take 1 tablet (81 mg total) by mouth daily. (Patient not taking: Reported on 6/27/2023), Disp: , Rfl:     Vitamin D3 1000 UNITS Oral Tab, Take 3 tablets (3,000 Units total) by mouth daily., Disp: , Rfl:     ibuprofen 200 MG Oral Tab, Take 1 tablet (200 mg total) by mouth every 6 (six) hours as needed for Pain. (Patient not taking: Reported on 6/27/2023), Disp: , Rfl:     Calcium Carbonate-Vitamin D 600-400 MG-UNIT Oral Tab, Take 1 tablet by mouth 2 (two) times daily., Disp: , Rfl:     Allergies:  Allergies   Allergen Reactions    Ciprofloxacin ITCHING and PAIN     Caused vaginal pain and itching.        Review of Systems:    Constitutional No fevers, chills, night sweats, excessive fatigue or weight loss.   Eyes No significant visual difficulties. No diplopia. No yellowing.   Hematologic/Lymphatic Normal - No easy bruising or bleeding.  No tender or palpable lymph nodes.   Respiratory No dyspnea, pleuritic chest pain, cough or hemoptysis.   Cardiovascular No anginal chest pain, palpitations or orthopnea.   Gastrointestinal No nausea, vomiting, diarrhea, GI bleeding, or constipation. NL appetite.   Genitorurinary  No hematuria, dysuria, abnormal bleeding, or incontinence.   Integumentary No rashes or yellowing of the skin   Neurologic No headache, blurred vision, and no areas of focal weakness. Normal gait.   Psychiatric No insomnia, depression, pelon or mood swings.         Vital Signs:  /69 (BP Location: Left arm, Patient Position: Sitting, Cuff Size: adult)   Pulse 73   Temp 97.3 °F (36.3 °C) (Temporal)   Resp 16   Wt 52.8 kg (116 lb 6.4 oz)   LMP  (LMP Unknown)   SpO2 99%   BMI 22.73 kg/m²        Physical Examination:    Constitutional Normal - Mood and affect appropriate. Appears close to chronological age. Well nourished. Well developed.    Eyes Normal - Conjunctivae and sclerae are clear and without icterus. Pupils are reactive and equal.   Hematologic/Lymphatic Normal - No petechiae or purpura.  No tender or palpable lymph nodes in the cervical, supraclavicular, axillary or inguinal area.   Respiratory Normal - Lungs CTA, no rhonchi or wheezing.   Cardiovascular Normal - RRR, no murmurs, gallops or rubs.   Abdomen Normal - Non-tender, non-distended, no masses, ascites or hepatosplenomegaly.    Extremities Normal - No C/C/E    Integumentary Normal - No rashes, No Jaundice   Neurologic Normal - No sensory or motor deficits, normal cerebellar function, normal gait, cranial nerves intact.   Psychiatric Normal - A&Ox3, Coherent speech. Verbalizes understanding of our discussions today.         Laboratory:  24-hour urine concentrated 50X is negative for Free Monoclonal Light Chains (Bence Nixon Protein  Recent Labs     04/20/24  1238   RBC 3.59 L   HGB 12.5   HCT 36.9   .8 H   MCH 34.8 H   MCHC 33.9   RDW 12.8   NEPRELIM 1.02 L   WBC 1.9 L   PLT 79.0 L     Recent Labs     04/20/24  1238   GLU 90   BUN 18   CREATSERUM 0.97   CA 8.8   ALB 2.9 L      K 3.8      CO2 26.0   ALKPHO 152 H   AST 73 H   ALT 36   BILT 2.1 H   TP 7.7        Latest Reference Range & Units 04/20/24 12:38   IMMUNOGLOBULIN A 70.00 - 312.00 mg/dL 507.00 (H)   IMMUNOGLOBULIN G 791 - 1,643 mg/dL 2,320 (H)   IMMUNOGLOBULIN M 43.0 - 279.0 mg/dL 319.0 (H)   (H): Data is abnormally high    Radiology:        Pathology:  Final Diagnosis:   Bone marrow core biopsy, clot section, and aspirate:  -Normocellular bone marrow with multilineage hematopoiesis and decreased storage iron.         Impression and Plan:  Leukopenia and Thrombocytopenia: The patient has splenomegaly and liver disease that likely explain the low platelets and WBC.  Counts wax and wane, but overall stable. 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 She developed a monoclonal IgM lambda.The workup for a low grade lymphoma, including CT C/A/P, 24 hr urine for Bence Nixon proteins, and a bone marrow biopsy were all negative. Given the rib pain, will check plain X-rays to eval for a lytic lesion or pathologic fracture.     Reticulonodular lung infiltrate: Repeat CT in 8/2024.     Planned Follow Up:  6 months      Electronically Signed by:    [facility]

## 2024-04-22 NOTE — PROGRESS NOTES
Pt here for follow up.   Pt complains of occasional pain  right rib cage.    Outpatient Oncology Care Plan  Problem list:  knowledge deficit    Problems related to:    disease/disease progression    Interventions:  provided general teaching    Expected outcomes:  understands plan of care    Progress towards outcome:  making progress    Education Record    Learner:  Patient  Barriers / Limitations:  None  Method:  Brief focused  Outcome:  Shows understanding  Comments:

## 2024-04-25 LAB
ALBUMIN SERPL ELPH-MCNC: 3.26 G/DL (ref 3.75–5.21)
ALBUMIN/GLOB SERPL: 0.81 {RATIO} (ref 1–2)
ALPHA1 GLOB SERPL ELPH-MCNC: 0.21 G/DL (ref 0.19–0.46)
ALPHA2 GLOB SERPL ELPH-MCNC: 0.48 G/DL (ref 0.48–1.05)
B-GLOBULIN SERPL ELPH-MCNC: 0.81 G/DL (ref 0.68–1.23)
GAMMA GLOB SERPL ELPH-MCNC: 2.54 G/DL (ref 0.62–1.7)
KAPPA LC FREE SER-MCNC: 10.64 MG/DL (ref 0.33–1.94)
KAPPA LC FREE/LAMBDA FREE SER NEPH: 1.33 {RATIO} (ref 0.26–1.65)
LAMBDA LC FREE SERPL-MCNC: 8.01 MG/DL (ref 0.57–2.63)
PROT SERPL-MCNC: 7.3 G/DL (ref 5.7–8.2)

## 2024-06-06 ENCOUNTER — OFFICE VISIT (OUTPATIENT)
Facility: LOCATION | Age: 74
End: 2024-06-06
Payer: MEDICARE

## 2024-06-06 DIAGNOSIS — R42 DIZZY: ICD-10-CM

## 2024-06-06 DIAGNOSIS — H93.12 LEFT-SIDED TINNITUS: ICD-10-CM

## 2024-06-06 DIAGNOSIS — H93.13 TINNITUS OF BOTH EARS: Primary | ICD-10-CM

## 2024-06-06 DIAGNOSIS — H93.8X2 EAR POPPING, LEFT: ICD-10-CM

## 2024-06-06 DIAGNOSIS — H90.3 SENSORINEURAL HEARING LOSS, BILATERAL: Primary | ICD-10-CM

## 2024-06-06 PROCEDURE — 92557 COMPREHENSIVE HEARING TEST: CPT | Performed by: AUDIOLOGIST

## 2024-06-06 PROCEDURE — 92567 TYMPANOMETRY: CPT | Performed by: AUDIOLOGIST

## 2024-06-06 PROCEDURE — 99203 OFFICE O/P NEW LOW 30 MIN: CPT | Performed by: OTOLARYNGOLOGY

## 2024-06-06 NOTE — PROGRESS NOTES
Barbara Phoenix is a 73 year old female. No chief complaint on file.    HPI:   She has a history of tinnitus and hearing loss.  She has no ear pain associated.  At 1 point she noticed acute drop in hearing in 1 year.  Current Outpatient Medications   Medication Sig Dispense Refill    furosemide 40 MG Oral Tab Take 1 tablet (40 mg total) by mouth daily.      Ursodiol 500 MG Oral Tab Take 1.5 tablets (750 mg total) by mouth daily. 135 tablet 3    losartan 50 MG Oral Tab Take 1 tablet (50 mg total) by mouth daily. 90 tablet 0    traMADol 50 MG Oral Tab Take 1 tablet (50 mg total) by mouth every 8 (eight) hours as needed for Pain. 15 tablet 0    azaTHIOprine 50 MG Oral Tab Take 0.5 tablets (25 mg total) by mouth daily. 45 tablet 3    OMEPRAZOLE 20 MG Oral Capsule Delayed Release TAKE ONE CAPSULE BY MOUTH 30 MINUTES BEFORE DINNER. 90 capsule 3    levothyroxine 25 MCG Oral Tab Take 1 tablet (25 mcg total) by mouth before breakfast. 90 tablet 3    estradiol (ESTRACE) 0.1 MG/GM Vaginal Cream Apply small pea sized amount vaginally nightly x 2 weeks then afterward 3 times weekly 1 each 3    Cholecalciferol (VITAMIN D-3 OR) Take 3,000 Units by mouth daily.      Vitamin B-12 1000 MCG Oral Tab Take 1 tablet (1,000 mcg total) by mouth daily.      aspirin 81 MG Oral Tab Take 1 tablet (81 mg total) by mouth daily.      Vitamin D3 1000 UNITS Oral Tab Take 3 tablets (3,000 Units total) by mouth daily.      ibuprofen 200 MG Oral Tab Take 1 tablet (200 mg total) by mouth every 6 (six) hours as needed for Pain.      Calcium Carbonate-Vitamin D 600-400 MG-UNIT Oral Tab Take 1 tablet by mouth 2 (two) times daily.        Past Medical History:    Abnormal TSH    \"borderline\"    Arthritis    Arthritis    hands    Atypical mole    Back pain    Bad breath    Blood disorder    thrombocytopenia and leukopenia    Blood in urine    Blurred vision    Blurred vision. Cataracts per MD.    Cholelithiasis    Clostridium difficile diarrhea    Colitis     Disorder of thyroid    Dizziness    Dysuria    Easy bruising    Esophageal reflux    Fatigue    Occasional    Frequent UTI    4 UTI’s in last 2 years    Gastritis    Heart murmur    Heart palpitations    Heart valve disease    Hemorrhoids    High blood pressure    History of cardiac murmur    Internal hemorrhoids    Leg swelling    Leukopenia    Nausea    Osteoporosis    Pain in joints    Hands, back, hips.    Primary biliary cirrhosis (HCC)    primary biliary cirrhosis    Skin lesion    shoulder, intradermal melanocytic nevus w/congenital features    Visual impairment    glasses    Wears glasses    Weight gain    Sudden onset of rapid weight gain (9-10 lbs.)      Social History:  Social History     Socioeconomic History    Marital status:    Tobacco Use    Smoking status: Former     Current packs/day: 0.00     Types: Cigarettes    Smokeless tobacco: Never    Tobacco comments:     Very light smoker my 20's & late 30's   Vaping Use    Vaping status: Never Used   Substance and Sexual Activity    Alcohol use: Not Currently     Comment: Rare; 3-4 drinks a YEAR.    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      Past Surgical History:   Procedure Laterality Date    Angiogram      Colonoscopy  2010    Colonoscopy      Colonoscopy N/A 2018    Procedure: COLONOSCOPY;  Surgeon: Vinicio Delgado MD;  Location:  ENDOSCOPY    [facility]      Needle biopsy liver        , ,      Tonsillectomy      as a child    Upper gi endoscopy,exam  2010         REVIEW OF SYSTEMS:   GENERAL HEALTH: feels well otherwise  GENERAL : denies fever, chills, sweats, weight loss, weight gain  SKIN:  denies any unusual skin lesions or rashes  RESPIRATORY: denies shortness of breath with exertion  NEURO: denies headaches    EXAM:   LMP  (LMP Unknown)     System Findings Details   Constitutional  Overall appearance - Normal.   Psychiatric  Orientation - Oriented to time, place, person & situation. Appropriate mood and affect.   Head/Face  Facial features -- Normal. Skull - Normal.   Eyes  Pupils equal ,round ,react to light and accomidate   Ears, Nose, Throat, Neck  She has a wax impaction almost completely filling the ear on the right-hand side removed today under the microscope otherwise to management is clear.  Left ear is otherwise clear.   Neurological  Memory - Normal. Cranial nerves - Cranial nerves II through XII grossly intact.   Lymph Detail  Submental. Submandibular. Anterior cervical. Posterior cervical. Supraclavicular.     Latest Audiogram Result (Hz) Exam performed: 6/6/2024 3:50 PM Last edited by Tammy Morales Au.D on 6/6/2024 4:18 PM        125 250  1500 2000 3000 4000 6000 8000    Right air:  25 25 30 40  55  55 60 75    Left air:  25 20 25 40  55  60 75 90    Left mastoid bone:   15  35  55  55         Reliability:  Good    Transducer:  Headphones    Technique:  Conventional Audiometry    Comments:            Latest Speech Audiometry  Last edited by Tammy Morales Au.D on 6/6/2024 4:13 PM       Ear Method PTA SAT SRT [facility] Test/list Score (%) Intensity Mask/noise Notes    right    30    84 65 45     left    35    84 65 45                   Latest Tympanogram Result       Probe Tone (Hz): Unknown Exam performed: 6/6/2024 3:50 PM Last edited by Tammy Morales Au.D on 6/6/2024 4:18 PM      Tympanograms  These were drawn by a user, not generated from device data      Right Ear Left Ear                     Right Ear Left Ear    Tympanogram type: Type C Type A    Canal volume (mL): 0.64 0.58    Peak pressure (daPa): -167 -14    Peak amplitude (mL): 0.68 1.09    Tympanogram  width (daPa):        Comments:                    Latest Audiogram and Tympanogram Result Text  Last edited by Tammy Morales Au.D on 6/6/2024  4:18 PM      Study Result                 Narrative & Impression  Patient complained of intermittent diminished left hearing sensitivity for the last 1.5 months, left tinnitus, left ear popping, & dizziness (per patient dizziness possibly associated with autoimmune liver disease).    Audiogram: WNL sloping to a mild to moderately-severe sensorineural hearing loss 9963-3560 Hz, bilaterally (L>R 2843-6241 Hz).    Word Recognition Score in Quiet: Good for each ear, respectively.    Tympanometry: Right negative pressure with normal compliance. Left WNL.    Recommend: Follow up with ENT & consider a trial with binaural amplification for better understanding of conversation, pernding physician  medical clearance.                     ASSESSMENT AND PLAN:   1. Tinnitus of both ears  Audiogram reviewed which shows sensorineural hearing loss.  She is medically cleared for hearing aids if she so desires.  The acute drop in hearing is likely due to wax impaction which I removed today.      The patient indicates understanding of these issues and agrees to the plan.    No follow-ups on file.    Loco Bradford MD  6/6/2024  5:59 PM

## 2024-06-06 NOTE — PROGRESS NOTES
Barbara Phoenix was seen for an audiometric evaluation and tympanogram today. Referred back to physician.    Tammy Morales, AuD

## 2024-08-12 DIAGNOSIS — I10 ESSENTIAL HYPERTENSION: ICD-10-CM

## 2024-08-12 DIAGNOSIS — Z00.00 ENCOUNTER FOR ANNUAL HEALTH EXAMINATION: Primary | ICD-10-CM

## 2024-08-21 ENCOUNTER — LAB ENCOUNTER (OUTPATIENT)
Dept: LAB | Facility: HOSPITAL | Age: 74
End: 2024-08-21
Attending: INTERNAL MEDICINE
Payer: MEDICARE

## 2024-08-21 DIAGNOSIS — Z00.00 ENCOUNTER FOR ANNUAL HEALTH EXAMINATION: ICD-10-CM

## 2024-08-21 DIAGNOSIS — I10 ESSENTIAL HYPERTENSION: ICD-10-CM

## 2024-08-21 LAB — TSI SER-ACNC: 3.68 MIU/ML (ref 0.55–4.78)

## 2024-08-21 PROCEDURE — 36415 COLL VENOUS BLD VENIPUNCTURE: CPT

## 2024-08-21 PROCEDURE — 84443 ASSAY THYROID STIM HORMONE: CPT

## 2024-09-12 ENCOUNTER — OFFICE VISIT (OUTPATIENT)
Dept: INTERNAL MEDICINE CLINIC | Facility: CLINIC | Age: 74
End: 2024-09-12
Payer: MEDICARE

## 2024-09-12 VITALS
OXYGEN SATURATION: 97 % | DIASTOLIC BLOOD PRESSURE: 60 MMHG | WEIGHT: 117 LBS | TEMPERATURE: 97 F | HEART RATE: 97 BPM | HEIGHT: 60 IN | SYSTOLIC BLOOD PRESSURE: 98 MMHG | BODY MASS INDEX: 22.97 KG/M2

## 2024-09-12 DIAGNOSIS — Z86.19 HX OF CLOSTRIDIUM DIFFICILE INFECTION: ICD-10-CM

## 2024-09-12 DIAGNOSIS — K74.3 PRIMARY BILIARY CIRRHOSIS (HCC): ICD-10-CM

## 2024-09-12 DIAGNOSIS — D70.8 OTHER NEUTROPENIA (HCC): ICD-10-CM

## 2024-09-12 DIAGNOSIS — I85.00 ESOPHAGEAL VARICES WITHOUT BLEEDING, UNSPECIFIED ESOPHAGEAL VARICES TYPE (HCC): ICD-10-CM

## 2024-09-12 DIAGNOSIS — N36.2 URETHRAL CARUNCLE: ICD-10-CM

## 2024-09-12 DIAGNOSIS — H93.13 TINNITUS OF BOTH EARS: ICD-10-CM

## 2024-09-12 DIAGNOSIS — D47.2 MONOCLONAL GAMMOPATHY OF UNKNOWN SIGNIFICANCE (MGUS): ICD-10-CM

## 2024-09-12 DIAGNOSIS — Z12.31 ENCOUNTER FOR SCREENING MAMMOGRAM FOR MALIGNANT NEOPLASM OF BREAST: ICD-10-CM

## 2024-09-12 DIAGNOSIS — I34.0 NON-RHEUMATIC MITRAL REGURGITATION: ICD-10-CM

## 2024-09-12 DIAGNOSIS — M81.0 SENILE OSTEOPOROSIS: ICD-10-CM

## 2024-09-12 DIAGNOSIS — Z00.00 ROUTINE GENERAL MEDICAL EXAMINATION AT A HEALTH CARE FACILITY: Primary | ICD-10-CM

## 2024-09-12 DIAGNOSIS — D69.6 THROMBOCYTOPENIA (HCC): ICD-10-CM

## 2024-09-12 DIAGNOSIS — E03.8 OTHER SPECIFIED HYPOTHYROIDISM: ICD-10-CM

## 2024-09-12 DIAGNOSIS — I10 BENIGN ESSENTIAL HTN: ICD-10-CM

## 2024-09-12 DIAGNOSIS — I25.10 CORONARY ARTERY DISEASE INVOLVING NATIVE CORONARY ARTERY OF NATIVE HEART WITHOUT ANGINA PECTORIS: ICD-10-CM

## 2024-09-12 PROBLEM — M85.80 OSTEOPENIA: Status: RESOLVED | Noted: 2023-07-05 | Resolved: 2024-09-12

## 2024-09-12 PROBLEM — S90.30XA CONTUSION OF FOOT OR HEEL: Status: RESOLVED | Noted: 2023-07-05 | Resolved: 2024-09-12

## 2024-09-12 RX ORDER — FUROSEMIDE 20 MG
20 TABLET ORAL EVERY EVENING
COMMUNITY
Start: 2024-08-29

## 2024-09-12 RX ORDER — PROPRANOLOL HYDROCHLORIDE 10 MG/1
10 TABLET ORAL
COMMUNITY
Start: 2024-06-05

## 2024-09-12 RX ORDER — LOSARTAN POTASSIUM 50 MG/1
25 TABLET ORAL DAILY
COMMUNITY
Start: 2024-09-12

## 2024-09-12 NOTE — PROGRESS NOTES
Subjective:   Barbara Phoenix is a 74 year old female who presents for a Subsequent Annual Wellness visit (Pt already had Initial Annual Wellness) and scheduled follow up of multiple significant but stable problems.     last ov > 1 year ago.  3/23  PCV 20.     Cirrhosis/GERD.  Dr Lewis (sept 26) and Dr Rai (Nov 4)  .  [facility]  Dr Clay.   Tinnitus with hearing loss.  Dr Bradford.   CAD/dyslipidemia.  [facility] Dr Gaitan.  Lexiscan 3/24  in creased LE edema in March.  Lasix increased and did better   has been referred to CHF clinic and has appt Sept 25  still with LE edema.  Suggestd compression. (Furosemide 40mg am, 20mg pm)  considering spironolactone.  Labs stable earlier this week.    Intermittent dizziness.  Sometimes worse sit to stand.  Her bp is low normal today.  Will try lower losartan.  (Needs lasix and propranolol)  [facility].    HTN  losartan 50mg.    Hypothyroid levothyroxine 25mcg.    Osteoporosis.  Dexa 3/23 -2.9 FN.  She a dose of reclast in 2020 but did not follow up as she was concerned about weight gain after reclast  she did not f/u with Dr Aragon as indicated on dexa as she was not seeing him anymore. She is interested in seeing what her options are for treatment of osteoporosis.  Not a candidate for oral meds due for varicies.       Dr Aragon  she is not following with him anymore.      History/Other:   Fall Risk Assessment:   She has been screened for Falls and is High Risk. Fall Prevention information provided to patient in After Visit Summary.    Do you feel unsteady when standing or walking?: Yes  Do you worry about falling?: Yes  Have you fallen in the past year?: Yes  How many times have you fallen?: (P) 3  Were you injured?: (P) No     Cognitive Assessment:   She had a completely normal cognitive assessment - see flowsheet entries       Functional Ability/Status:   Barbara Phoenix has a completely normal functional assessment. See flowsheet for details.      Depression  Screening (PHQ):  PHQ-2 SCORE: 0  , done 9/12/2024             Advanced Directives:   She does NOT have a Living Will. [Do you have a living will?: No]  She does NOT have a Power of  for Health Care. [Do you have a healthcare power of ?: No]  Discussed Advance Care Planning with patient (and family/surrogate if present). Standard forms made available to patient in After Visit Summary.      Patient Active Problem List   Diagnosis    Primary biliary cirrhosis (HCC)    Leukopenia    CAD (coronary artery disease)    Non-rheumatic mitral regurgitation    Hx of Clostridium difficile infection    Esophageal varices without bleeding (HCC)    Benign essential HTN    Senile osteoporosis    Monoclonal gammopathy of unknown significance ([facility])    Other specified hypothyroidism    Thrombocytopenia (HCC)    Urethral caruncle     Allergies:  She is allergic to ciprofloxacin.    Current Medications:  Outpatient Medications Marked as Taking for the 9/12/24 encounter (Office Visit) with Marisa Turpin APRN   Medication Sig    propranolol 10 MG Oral Tab Take 1 tablet (10 mg total) by mouth.    furosemide 20 MG Oral Tab Take 1 tablet (20 mg total) by mouth every evening.    losartan 50 MG Oral Tab Take 0.5 tablets (25 mg total) by mouth daily.    OMEPRAZOLE 20 MG Oral Capsule Delayed Release TAKE 1 CAPSULE BY MOUTH 30 MINUTES BEFORE DINNER    furosemide 40 MG Oral Tab Take 1 tablet (40 mg total) by mouth daily.    Ursodiol 500 MG Oral Tab Take 1.5 tablets (750 mg total) by mouth daily.    azaTHIOprine 50 MG Oral Tab Take 0.5 tablets (25 mg total) by mouth daily.    levothyroxine 25 MCG Oral Tab Take 1 tablet (25 mcg total) by mouth before breakfast.    Cholecalciferol (VITAMIN D-3 OR) Take 3,000 Units by mouth daily.    Vitamin B-12 1000 MCG Oral Tab Take 1 tablet (1,000 mcg total) by mouth daily.    aspirin 81 MG Oral Tab Take 1 tablet (81 mg total) by mouth daily.    Vitamin D3 1000 UNITS Oral Tab Take 3 tablets  (3,000 Units total) by mouth daily.    ibuprofen 200 MG Oral Tab Take 1 tablet (200 mg total) by mouth every 6 (six) hours as needed for Pain.    Calcium Carbonate-Vitamin D 600-400 MG-UNIT Oral Tab Take 1 tablet by mouth 2 (two) times daily.       Medical History:  She  has a past medical history of Abnormal TSH, Arthritis, Arthritis, Atypical mole, Back pain, Bad breath, Blood disorder, Blood in urine, Blurred vision, Cholelithiasis, Clostridium difficile diarrhea (2013?), Colitis, Disorder of thyroid, Dizziness, Dysuria, Easy bruising, Esophageal reflux, Fatigue, Frequent UTI, Gastritis, Heart murmur, Heart palpitations, Heart valve disease, Hemorrhoids, High blood pressure, History of cardiac murmur, Internal hemorrhoids, Leg swelling, Leukopenia, Nausea, Osteoporosis, Pain in joints, Primary biliary cirrhosis (HCC), Skin lesion, Visual impairment, Wears glasses, and Weight gain.  Surgical History:  She  has a past surgical history that includes tonsillectomy; colonoscopy (2010); upper gi endoscopy,exam (2010); colonoscopy;  (, ,  ); angiogram; colonoscopy (N/A, 2018); egd; and needle biopsy liver.   Family History:  Her family history includes AAA in her mother; Diabetes in her maternal grandfather and paternal grandmother; Heart Disease in an other family member; Heart Disorder in her father and paternal grandfather; Heart Surgery (age of onset: 74) in her father; Hypertension in her father; cabg in her father; copd in her mother; heart disease in her father; hemochromatosis in her mother; htn in her father; osteopenia in her mother.  Social History:  She  reports that she has quit smoking. Her smoking use included cigarettes. She has never used smokeless tobacco. She reports that she does not currently use alcohol. She reports that she does not use drugs.    Tobacco:  She smoked tobacco in the past but quit greater than 12 months ago.  Social History     Tobacco Use   Smoking  Status Former    Current packs/day: 0.00    Types: Cigarettes   Smokeless Tobacco Never   Tobacco Comments    Very light smoker my 20's & late 30's        CAGE Alcohol Screen:   CAGE screening score of 0 on 9/12/2024, showing low risk of alcohol abuse.      Patient Care Team:  Zenia Astudillo MD as PCP - General (Internal Medicine)  Logan Roque MD (GASTROENTEROLOGY)  Maribeth Castellanos MD as Consulting Physician (CARDIOLOGY)  Ruslan Clay MD as Consulting Physician (ONCOLOGY)  Isma Gaitan MD (CARDIOLOGY)  Ruslan Clay MD (ONCOLOGY)  Bella Paul APRN (Nurse Practitioner)    Review of Systems     Negative except as above     Objective:   Physical Exam  Physical Exam  General Appearance:  Alert, cooperative, no distress, appears stated age   Head:  Normocephalic, without obvious abnormality, atraumatic   Eyes:  PERRL, conjunctiva/corneas clear, EOM's intact both eyes   Ears:  Normal TM's and external ear canals, both ears   Nose: Nares normal, septum midline,mucosa normal, no drainage or sinus tenderness   Throat: Lips, mucosa, and tongue normal; teeth and gums normal   Neck: Supple, symmetrical, trachea midline, no adenopathy;  thyroid: not enlarged, symmetric, no JVD   Back:   Symmetric, no curvature, ROM normal, no CVA tenderness   Lungs:   Clear to auscultation bilaterally, respirations unlabored   Heart:  Regular rate and rhythm, S1 and S2 normal,  murmur   Abdomen:   Soft, non-tender, bowel sounds active all four quadrants,  no masses,    Extremities: Extremities normal, atraumatic, bilateral LE edema she states is greatly improved.      Pulses: symmetric   Skin: Skin color, texture, turgor normal,    Lymph nodes: Cervical, supraclavicular nodes normal   Neurologic: Normal       BREAST: Normal appearance.  No dominant or suspicious mass. No tenderness.      /60   Pulse 97   Temp 96.6 °F (35.9 °C) (Temporal)   Ht 5' (1.524 m)   Wt 117 lb (53.1 kg)   LMP  (LMP Unknown)   SpO2  97%   BMI 22.85 kg/m²  Estimated body mass index is 22.85 kg/m² as calculated from the following:    Height as of this encounter: 5' (1.524 m).    Weight as of this encounter: 117 lb (53.1 kg).    Medicare Hearing Assessment:   Hearing Screening    Time taken: 9/12/2024  9:21 AM  Screening Method: Finger Rub  Finger Rub Result: Pass         Visual Acuity:   Right Eye Visual Acuity: Corrected Right Eye Chart Acuity: 20/50   Left Eye Visual Acuity: Corrected Left Eye Chart Acuity: 20/70   Both Eyes Visual Acuity: Corrected Both Eyes Chart Acuity: 20/70   Able To Tolerate Visual Acuity: Yes        Assessment & Plan:   Barbara Phoenix is a 74 year old female who presents for a Medicare Assessment.     1. Routine general medical examination at a health care facility (Primary)  2. Esophageal varices without bleeding, unspecified esophageal varices type (HCC)  stable  Dr Chang.  PPI  3. Primary biliary cirrhosis (HCC)  stable Dr Rai.    4. Thrombocytopenia (HCC)  stable  Dr Clay.    5. Benign essential HTN  bp low with dizziness.  Will decresae losartan to 25mg.  She will continue furosemide and propranolol as she needs these for her cirrhosis/heart.   Monitor closely with upcomign ov.    6. Coronary artery disease involving native coronary artery of native heart without angina pectoris  stalbe  per Dr Gaitan  7. Non-rheumatic mitral regurgitation  stable  per Dr Gaitan  8. Other specified hypothyroidism  stable  levothyroixne.   9. Senile osteoporosis  she has not followed with Dr Aragon since reclast in 2020.  Oral agents are not a great options due to above.  Will refer to Endo.    -     Endocrine Referral - In Network  10. Other neutropenia (HCC)  stable  monitor.  Per DR Clay  alst ov 4/24  reviewd.   11. Urethral caruncle  stable  monitor.   12. Hx of Clostridium difficile infection  stable  [facility].   13. Monoclonal gammopathy of unknown significance (MGUS)  stable  per Dr Clay.    14.  Tinnitus of both ears  recent ENT evaluation.  [facility].   15. Encounter for screening mammogram for malignant neoplasm of breast  -     [facility] RONNY 2D+3D SCREENING BILAT (CPT=77067/33841); Future; Expected date: 09/12/2024  Other orders  -     Prevnar 20 (PCV20) [54027]    The patient indicates understanding of these issues and agrees to the plan.  Reinforced healthy diet, lifestyle, and exercise.      No follow-ups on file.     VAN Ang, 9/12/2024     Supplementary Documentation:   General Health:  In the past six months, have you lost more than 10 pounds without trying?: 2 - No  Has your appetite been poor?: No  Type of Diet: Balanced  How does the patient maintain a good energy level?: Daily Walks  How would you describe your daily physical activity?: Light  How would you describe your current health state?: Fair  How do you maintain positive mental well-being?: Social Interaction;Puzzles;Visiting Family  On a scale of 0 to 10, with 0 being no pain and 10 being severe pain, what is your pain level?: 4 - (Moderate)  In the past six months, have you experienced urine leakage?: 0-No  At any time do you feel concerned for the safety/well-being of yourself and/or your children, in your home or elsewhere?: No  Have you had any immunizations at another office such as Influenza, Hepatitis B, Tetanus, or Pneumococcal?: No    Health Maintenance   Topic Date Due    Zoster Vaccines (1 of 2) Never done    Annual Depression Screening  01/01/2024    Annual Physical  03/02/2024    COVID-19 Vaccine (4 - 2023-24 season) 09/01/2024    Influenza Vaccine (1) 10/01/2024    DEXA Scan  Completed    Fall Risk Screening (Annual)  Completed    Pneumococcal Vaccine: 65+ Years  Completed    Colorectal Cancer Screening  Discontinued    Mammogram  Discontinued

## 2024-09-26 PROBLEM — I50.30 (HFPEF) HEART FAILURE WITH PRESERVED EJECTION FRACTION (HCC): Status: ACTIVE | Noted: 2024-09-23

## 2024-10-15 ENCOUNTER — TELEPHONE (OUTPATIENT)
Dept: HEMATOLOGY/ONCOLOGY | Facility: HOSPITAL | Age: 74
End: 2024-10-15

## 2024-10-15 DIAGNOSIS — D47.2 MGUS (MONOCLONAL GAMMOPATHY OF UNKNOWN SIGNIFICANCE): Primary | ICD-10-CM

## 2024-10-15 NOTE — TELEPHONE ENCOUNTER
Pt called stated she need orders in the system so she can get her labs done    Please put orders in the system

## 2024-10-16 ENCOUNTER — LAB ENCOUNTER (OUTPATIENT)
Dept: LAB | Facility: HOSPITAL | Age: 74
End: 2024-10-16
Attending: FAMILY MEDICINE
Payer: MEDICARE

## 2024-10-16 DIAGNOSIS — K74.69 OTHER CIRRHOSIS OF LIVER (HCC): ICD-10-CM

## 2024-10-16 DIAGNOSIS — K74.3 PRIMARY BILIARY CIRRHOSIS (HCC): ICD-10-CM

## 2024-10-16 DIAGNOSIS — I85.00 ESOPHAGEAL VARICES WITHOUT BLEEDING, UNSPECIFIED ESOPHAGEAL VARICES TYPE (HCC): ICD-10-CM

## 2024-10-16 DIAGNOSIS — D47.2 MGUS (MONOCLONAL GAMMOPATHY OF UNKNOWN SIGNIFICANCE): ICD-10-CM

## 2024-10-16 LAB
AFP-TM SERPL-MCNC: 7.7 NG/ML
ALBUMIN SERPL-MCNC: 3.5 G/DL (ref 3.2–4.8)
ALBUMIN/GLOB SERPL: 0.9 {RATIO} (ref 1–2)
ALP LIVER SERPL-CCNC: 152 U/L
ALT SERPL-CCNC: 35 U/L
ANION GAP SERPL CALC-SCNC: 4 MMOL/L (ref 0–18)
AST SERPL-CCNC: 66 U/L (ref ?–34)
BASOPHILS # BLD AUTO: 0.02 X10(3) UL (ref 0–0.2)
BASOPHILS NFR BLD AUTO: 0.9 %
BILIRUB SERPL-MCNC: 2.2 MG/DL (ref 0.2–1.1)
BUN BLD-MCNC: 19 MG/DL (ref 9–23)
CALCIUM BLD-MCNC: 9 MG/DL (ref 8.7–10.4)
CHLORIDE SERPL-SCNC: 108 MMOL/L (ref 98–112)
CO2 SERPL-SCNC: 27 MMOL/L (ref 21–32)
CREAT BLD-MCNC: 0.96 MG/DL
EGFRCR SERPLBLD CKD-EPI 2021: 61 ML/MIN/1.73M2 (ref 60–?)
EOSINOPHIL # BLD AUTO: 0.13 X10(3) UL (ref 0–0.7)
EOSINOPHIL NFR BLD AUTO: 6.2 %
ERYTHROCYTE [DISTWIDTH] IN BLOOD BY AUTOMATED COUNT: 14.4 %
FASTING STATUS PATIENT QL REPORTED: NO
GLOBULIN PLAS-MCNC: 3.7 G/DL (ref 2–3.5)
GLUCOSE BLD-MCNC: 77 MG/DL (ref 70–99)
HCT VFR BLD AUTO: 36 %
HGB BLD-MCNC: 12.5 G/DL
IGA SERPL-MCNC: 481.4 MG/DL (ref 40–350)
IGM SERPL-MCNC: 328 MG/DL (ref 50–300)
IMM GRANULOCYTES # BLD AUTO: 0 X10(3) UL (ref 0–1)
IMM GRANULOCYTES NFR BLD: 0 %
IMMUNOGLOBULIN PNL SER-MCNC: 1985 MG/DL (ref 650–1600)
INR BLD: 1.29 (ref 0.8–1.2)
LYMPHOCYTES # BLD AUTO: 0.52 X10(3) UL (ref 1–4)
LYMPHOCYTES NFR BLD AUTO: 24.6 %
MCH RBC QN AUTO: 35.8 PG (ref 26–34)
MCHC RBC AUTO-ENTMCNC: 34.7 G/DL (ref 31–37)
MCV RBC AUTO: 103.2 FL
MONOCYTES # BLD AUTO: 0.24 X10(3) UL (ref 0.1–1)
MONOCYTES NFR BLD AUTO: 11.4 %
NEUTROPHILS # BLD AUTO: 1.2 X10 (3) UL (ref 1.5–7.7)
NEUTROPHILS # BLD AUTO: 1.2 X10(3) UL (ref 1.5–7.7)
NEUTROPHILS NFR BLD AUTO: 56.9 %
OSMOLALITY SERPL CALC.SUM OF ELEC: 289 MOSM/KG (ref 275–295)
PLATELET # BLD AUTO: 96 10(3)UL (ref 150–450)
PLATELETS.RETICULATED NFR BLD AUTO: 5 % (ref 0–7)
POTASSIUM SERPL-SCNC: 3.7 MMOL/L (ref 3.5–5.1)
PROT SERPL-MCNC: 7.2 G/DL (ref 5.7–8.2)
PROTHROMBIN TIME: 16.2 SECONDS (ref 11.6–14.8)
RBC # BLD AUTO: 3.49 X10(6)UL
SODIUM SERPL-SCNC: 139 MMOL/L (ref 136–145)
WBC # BLD AUTO: 2.1 X10(3) UL (ref 4–11)

## 2024-10-16 PROCEDURE — 85610 PROTHROMBIN TIME: CPT

## 2024-10-16 PROCEDURE — 84165 PROTEIN E-PHORESIS SERUM: CPT

## 2024-10-16 PROCEDURE — 86334 IMMUNOFIX E-PHORESIS SERUM: CPT

## 2024-10-16 PROCEDURE — 85025 COMPLETE CBC W/AUTO DIFF WBC: CPT

## 2024-10-16 PROCEDURE — 83521 IG LIGHT CHAINS FREE EACH: CPT

## 2024-10-16 PROCEDURE — 82105 ALPHA-FETOPROTEIN SERUM: CPT

## 2024-10-16 PROCEDURE — 80053 COMPREHEN METABOLIC PANEL: CPT

## 2024-10-16 PROCEDURE — 82784 ASSAY IGA/IGD/IGG/IGM EACH: CPT

## 2024-10-16 PROCEDURE — 36415 COLL VENOUS BLD VENIPUNCTURE: CPT

## 2024-10-21 ENCOUNTER — OFFICE VISIT (OUTPATIENT)
Dept: HEMATOLOGY/ONCOLOGY | Facility: HOSPITAL | Age: 74
End: 2024-10-21
Attending: INTERNAL MEDICINE
Payer: MEDICARE

## 2024-10-21 VITALS
DIASTOLIC BLOOD PRESSURE: 50 MMHG | WEIGHT: 118 LBS | HEART RATE: 55 BPM | TEMPERATURE: 97 F | OXYGEN SATURATION: 100 % | BODY MASS INDEX: 23 KG/M2 | RESPIRATION RATE: 16 BRPM | SYSTOLIC BLOOD PRESSURE: 138 MMHG

## 2024-10-21 DIAGNOSIS — D47.2 MONOCLONAL GAMMOPATHY: Primary | ICD-10-CM

## 2024-10-21 DIAGNOSIS — D72.819 LEUKOPENIA, UNSPECIFIED TYPE: ICD-10-CM

## 2024-10-21 LAB
ALBUMIN SERPL ELPH-MCNC: 3.24 G/DL (ref 3.75–5.21)
ALBUMIN/GLOB SERPL: 0.89 {RATIO} (ref 1–2)
ALPHA1 GLOB SERPL ELPH-MCNC: 0.19 G/DL (ref 0.19–0.46)
ALPHA2 GLOB SERPL ELPH-MCNC: 0.44 G/DL (ref 0.48–1.05)
B-GLOBULIN SERPL ELPH-MCNC: 0.79 G/DL (ref 0.68–1.23)
GAMMA GLOB SERPL ELPH-MCNC: 2.23 G/DL (ref 0.62–1.7)
KAPPA LC FREE SER-MCNC: 8.33 MG/DL (ref 0.33–1.94)
KAPPA LC FREE/LAMBDA FREE SER NEPH: 1.37 {RATIO} (ref 0.26–1.65)
LAMBDA LC FREE SERPL-MCNC: 6.06 MG/DL (ref 0.57–2.63)
PROT SERPL-MCNC: 6.9 G/DL (ref 5.7–8.2)

## 2024-10-21 PROCEDURE — 99214 OFFICE O/P EST MOD 30 MIN: CPT | Performed by: INTERNAL MEDICINE

## 2024-10-21 PROCEDURE — G2211 COMPLEX E/M VISIT ADD ON: HCPCS | Performed by: INTERNAL MEDICINE

## 2024-10-21 NOTE — PROGRESS NOTES
Pt here for follow up.    She complains of swelling of her legs.      Outpatient Oncology Care Plan  Problem list:  knowledge deficit    Problems related to:    disease/disease progression    Interventions:  provided general teaching    Expected outcomes:  understands plan of care    Progress towards outcome:  making progress    Education Record    Learner:  Patient  Barriers / Limitations:  None  Method:  Brief focused  Outcome:  Shows understanding  Comments:

## 2024-10-21 NOTE — PROGRESS NOTES
Cancer Center Progress Note  Patient Name: Barbara Phoenix   YOB: 1950   Medical Record Number: [id number]     Attending Physician: Ruslan Clay M.D.       Date of Visit: 10/21/2024          Chief Complaint:  Chief Complaint   Patient presents with    Follow - Up        Oncologic History:  Barbara Phoenix is a 74 year old female referred by Dr. Ruggiero and Dr. Oh for evaluation of her leukopenia and thrombocytopenia.  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  She has chronic splenomegaly and has had chronic leukopenia.  Over the past few year, she has developed thrombocytopenia, as well.  She was seen by Dr. Oh, hepatologist, a few days ago who noted that her leukopenia and thrombocytopenia are somewhat lower than would be expected for the severity of her liver disease and splenomegaly.  A monoclonal protein study obtained in June had demonstrated a polyclonal elevation of her free light chains, but with a slightly elevated K:L ratio. Her creatinine was normal. She denies bone pain. No F/C/NS.  No palpable LAD.  No wt loss.   She has long standing osteoporosis and had a \"stress\" fracture of the ankle in 2014.      History of Present Illness:  Pt is here for follow up. She feels well, but has BLE edema. She is working with her cardiologist and hepatologist to figure out the cause.   No F/C/NS.   Energy is stable.    Performance Status:  ECOG 0    Past Medical History:  Past Medical History:    Abnormal TSH    \"borderline\"    Arthritis    Arthritis    hands    Atypical mole    Back pain    Bad breath    Blood disorder    thrombocytopenia and leukopenia    Blood in urine    Blurred vision    Blurred vision. Cataracts per MD.    Cholelithiasis    Clostridium difficile diarrhea    Colitis    Disorder of thyroid    Dizziness    Dysuria    Easy bruising    Esophageal reflux    Fatigue    Occasional    Frequent UTI    4 UTI’s in last 2 years     Gastritis    Hearing loss    Heart murmur    Heart palpitations    Heart valve disease    Hemorrhoids    High blood pressure    History of cardiac murmur    Internal hemorrhoids    Leg swelling    Leukopenia    Nausea    Osteoporosis    Pain in joints    Hands, back, hips.    Primary biliary cirrhosis (HCC)    primary biliary cirrhosis    Skin lesion    shoulder, intradermal melanocytic nevus w/congenital features    Visual impairment    glasses    Wears glasses    Weight gain    Sudden onset of rapid weight gain (9-10 lbs.)       Past Surgical History:  Past Surgical History:   Procedure Laterality Date    Angiogram      Colonoscopy  2010    Colonoscopy      Colonoscopy N/A 2018    Procedure: COLONOSCOPY;  Surgeon: Vinicio Delgado MD;  Location:  ENDOSCOPY    [facility]      Needle biopsy liver        , ,      Tonsillectomy      as a child    Upper gi endoscopy,exam  2010       Family History:  Family History   Problem Relation Age of Onset    Heart Disorder Father     Hypertension Father     Heart Surgery Father 74        CABG    Other (heart disease) Father     Other (htn) Father     Other (cabg) Father     Other (copd) Mother         not a smoker    Other (osteopenia) Mother     Other (hemochromatosis) Mother     Other (AAA) Mother     Diabetes Paternal Grandmother     Diabetes Maternal Grandfather     Heart Disorder Paternal Grandfather     Heart Disease Other     Cancer Neg     Stroke Neg        Social History:  Social History     Socioeconomic History    Marital status:      Spouse name: Not on file    Number of children: Not on file    Years of education: Not on file    Highest education level: Not on file   Occupational History    Not on file   Tobacco Use    Smoking status: Former     Current packs/day: 0.00     Types: Cigarettes    Smokeless tobacco: Never    Tobacco comments:     Very light smoker my 20's & late 30's   Vaping Use    Vaping status: Never Used    Substance and Sexual Activity    Alcohol use: Not Currently     Comment: Rare; 3-4 drinks a YEAR.    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     Social Drivers of Health     Financial Resource Strain: Not on file   Food Insecurity: Not on file   Transportation Needs: Not on file   Physical Activity: Not on file   Stress: Not on file   Social Connections: Not on file   Housing Stability: Not on file       Current Medications:    Current Outpatient Medications:     polyethylene glycol, PEG 3350-KCl-NaBcb-NaCl-NaSulf, 236 g Oral Recon Soln, Take as directed by provider, Disp: 4000 mL, Rfl: 0    propranolol 10 MG Oral Tab, Take 1 tablet (10 mg total) by mouth., Disp: , Rfl:     furosemide 20 MG Oral Tab, Take 1 tablet (20 mg total) by mouth every evening., Disp: , Rfl:     losartan 50 MG Oral Tab, Take 0.5 tablets (25 mg total) by mouth daily., Disp: , Rfl:     OMEPRAZOLE 20 MG Oral Capsule Delayed Release, TAKE 1 CAPSULE BY MOUTH 30 MINUTES BEFORE DINNER, Disp: 90 capsule, Rfl: 3    furosemide 40 MG Oral Tab, Take 1 tablet (40 mg total) by mouth daily., Disp: , Rfl:     Ursodiol 500 MG Oral Tab, Take 1.5 tablets (750 mg total) by mouth daily., Disp: 135 tablet, Rfl: 3    azaTHIOprine 50 MG Oral Tab, Take 0.5 tablets (25 mg total) by mouth daily., Disp: 45 tablet, Rfl: 3    levothyroxine 25 MCG Oral Tab, Take 1 tablet (25 mcg total) by mouth before breakfast., Disp: 90 tablet, Rfl: 3     Cholecalciferol (VITAMIN D-3 OR), Take 3,000 Units by mouth daily., Disp: , Rfl:     Vitamin B-12 1000 MCG Oral Tab, Take 1 tablet (1,000 mcg total) by mouth daily., Disp: , Rfl:     aspirin 81 MG Oral Tab, Take 1 tablet (81 mg total) by mouth daily., Disp: , Rfl:     Vitamin D3 1000 UNITS Oral Tab, Take 3 tablets (3,000 Units total) by mouth daily., Disp: , Rfl:     ibuprofen 200 MG Oral Tab, Take 1 tablet (200 mg total) by mouth every 6 (six) hours as needed for Pain., Disp: , Rfl:     Calcium Carbonate-Vitamin D 600-400 MG-UNIT Oral Tab, Take 1 tablet by mouth 2 (two) times daily., Disp: , Rfl:     Allergies:  Allergies   Allergen Reactions    Ciprofloxacin ITCHING and PAIN     Caused vaginal pain and itching.        Review of Systems:    Constitutional No fevers, chills, night sweats, excessive fatigue or weight loss.   Eyes No significant visual difficulties. No diplopia. No yellowing.   Hematologic/Lymphatic Normal - No easy bruising or bleeding.  No tender or palpable lymph nodes.   Respiratory No dyspnea, pleuritic chest pain, cough or hemoptysis.   Cardiovascular No anginal chest pain, palpitations or orthopnea.   Gastrointestinal No nausea, vomiting, diarrhea, GI bleeding, or constipation. NL appetite.   Genitorurinary  No hematuria, dysuria, abnormal bleeding, or incontinence.   Integumentary No rashes or yellowing of the skin   Neurologic No headache, blurred vision, and no areas of focal weakness. Normal gait.   Psychiatric No insomnia, depression, pelon or mood swings.         Vital Signs:  /50 (BP Location: Left arm, Cuff Size: adult)   Pulse 55   Temp 97.2 °F (36.2 °C) (Tympanic)   Resp 16   Wt 53.5 kg (118 lb)   LMP  (LMP Unknown)   SpO2 100%   BMI 23.05 kg/m²        Physical Examination:    Constitutional Normal - Mood and affect appropriate. Appears close to chronological age. Well nourished. Well developed.   Eyes Normal - Conjunctivae and sclerae are clear and without icterus.  Pupils are reactive and equal.   Hematologic/Lymphatic Normal - No petechiae or purpura.  No tender or palpable lymph nodes in the cervical, supraclavicular, axillary or inguinal area.   Respiratory Normal - Lungs CTA, no rhonchi or wheezing.   Cardiovascular Normal - RRR, no murmurs, gallops or rubs.   Abdomen Normal - Non-tender, non-distended, no masses, ascites or hepatosplenomegaly.    Extremities Normal - No C/C/E    Integumentary Normal - No rashes, No Jaundice   Neurologic Normal - No sensory or motor deficits, normal cerebellar function, normal gait, cranial nerves intact.   Psychiatric Normal - A&Ox3, Coherent speech. Verbalizes understanding of our discussions today.         Laboratory:  24-hour urine concentrated 50X is negative for Free Monoclonal Light Chains (Bence Nixon Protein   Latest Reference Range & Units 10/16/24 09:32   Glucose 70 - 99 mg/dL 77   Sodium 136 - 145 mmol/L 139   Potassium 3.5 - 5.1 mmol/L 3.7   Chloride 98 - 112 mmol/L 108   Carbon Dioxide, Total 21.0 - 32.0 mmol/L 27.0   BUN 9 - 23 mg/dL 19   CREATININE 0.55 - 1.02 mg/dL 0.96   CALCIUM 8.7 - 10.4 mg/dL 9.0   EGFR >=60 mL/min/1.73m2 61      Latest Reference Range & Units 10/16/24 09:32   ANION GAP 0 - 18 mmol/L 4   CALCULATED OSMOLALITY 275 - 295 mOsm/kg 289   ALKALINE PHOSPHATASE 55 - 142 U/L 152 (H)   AST (SGOT) <34 U/L 66 (H)   ALT (SGPT) 10 - 49 U/L 35   Total Bilirubin 0.2 - 1.1 mg/dL 2.2 (H)   Globulin 2.0 - 3.5 g/dL 3.7 (H)   (H): Data is abnormally high     Latest Reference Range & Units 10/16/24 09:32   WBC 4.0 - 11.0 x10(3) uL 2.1 (L)   Hemoglobin 12.0 - 16.0 g/dL 12.5   Hematocrit 35.0 - 48.0 % 36.0   Platelet Count 150.0 - 450.0 10(3)uL 96.0 (L)   RBC 3.80 - 5.30 x10(6)uL 3.49 (L)   MCH 26.0 - 34.0 pg 35.8 (H)   MCHC 31.0 - 37.0 g/dL 34.7   MCV 80.0 - 100.0 fL 103.2 (H)   RDW % 14.4   Immature Platelet Fraction 0.0 - 7.0 % 5.0   Prelim Neutrophil Abs 1.50 - 7.70 x10 (3) uL 1.20 (L)   Neutrophils Absolute 1.50 - 7.70  x10(3) uL 1.20 (L)   Lymphocytes Absolute 1.00 - 4.00 x10(3) uL 0.52 (L)   Monocytes Absolute 0.10 - 1.00 x10(3) uL 0.24   Eosinophils Absolute 0.00 - 0.70 x10(3) uL 0.13   Basophils Absolute 0.00 - 0.20 x10(3) uL 0.02   Immature Granulocyte Absolute 0.00 - 1.00 x10(3) uL 0.00   Neutrophils % % 56.9   Lymphocytes % % 24.6   Monocytes % % 11.4   Eosinophils % % 6.2   Basophils % % 0.9   Immature Granulocyte % % 0.0   (L): Data is abnormally low  (H): Data is abnormally high     Latest Reference Range & Units 10/16/24 09:32   IMMUNOGLOBULIN A 40.00 - 350.00 mg/dL 481.40 (H)   IMMUNOGLOBULIN G 650 - 1,600 mg/dL 1,985 (H)   IMMUNOGLOBULIN M 50.0 - 300.0 mg/dL 328.0 (H)   (H): Data is abnormally high   Latest Reference Range & Units 04/20/24 12:38   A/G Ratio 1.0 - 2.0  0.6 (L)   KAPPA FREE LIGHT CHAIN 0.330 - 1.940 mg/dL 10.639 (H)   LAMBDA FREE LIGHT CHAIN 0.571 - 2.630 mg/dL 8.011 (H)   KAPPA/LAMBDA FLC RATIO 0.26 - 1.65  1.33   PROTEIN, TOTAL 5.7 - 8.2 g/dL  6.4 - 8.2 g/dL 7.3  7.7   Albumin 3.75 - 5.21 g/dL  3.4 - 5.0 g/dL 3.26 (L)  2.9 (L)   ALPHA-1-GLOBULINS 0.19 - 0.46 g/dL 0.21   ALPHA-2-GLOBULINS 0.48 - 1.05 g/dL 0.48   BETA GLOBULINS 0.68 - 1.23 g/dL 0.81   GAMMA GLOBULINS 0.62 - 1.70 g/dL 2.54 (H)   ALBUMIN/GLOBULIN RATIO 1.00 - 2.00  0.81 (L)   SPE INTERPRETATION  Monoclonal spike in the gamma region.  Too small to quantitate.    Reviewed by Cathryn Goldberg, M.D. Pathology 04/25/24 at 4:51 PM     IMMUNOFIXATION  Monoclonal IgM lambda.If clinically indicated, 24 hour urine monoclonal  protein studies is suggested.    Reviewed by Cathryn Goldberg, M.D. Pathology 04/25/24 at 4:51 PM     (L): Data is abnormally low  (H): Data is abnormally high  Radiology:        Pathology:  Final Diagnosis:   Bone marrow core biopsy, clot section, and aspirate:  -Normocellular bone marrow with multilineage hematopoiesis and decreased storage iron.         Impression and Plan:  Leukopenia and Thrombocytopenia: The patient has  splenomegaly and liver disease that likely explain the low platelets and WBC.  Counts wax and wane, but overall stable. 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 She developed a monoclonal IgM lambda.The workup for a low grade lymphoma, including CT C/A/P, 24 hr urine for Bence Nixon proteins, and a bone marrow biopsy were all negative.    Planned Follow Up:  6 months    The patient and I have a longitudinal relationship to address/treat this serious and complex condition      Electronically Signed by:    Ruslan Clay M.D.  [facility]

## 2024-11-04 ENCOUNTER — APPOINTMENT (OUTPATIENT)
Dept: SURGERY | Facility: CLINIC | Age: 74
End: 2024-11-04

## 2024-12-26 ENCOUNTER — HOSPITAL ENCOUNTER (OUTPATIENT)
Dept: MAMMOGRAPHY | Age: 74
Discharge: HOME | End: 2024-12-26
Attending: NURSE PRACTITIONER
Payer: MEDICARE

## 2024-12-26 DIAGNOSIS — Z12.31 ENCOUNTER FOR SCREENING MAMMOGRAM FOR MALIGNANT NEOPLASM OF BREAST: ICD-10-CM

## 2024-12-26 PROCEDURE — 77067 SCR MAMMO BI INCL CAD: CPT | Performed by: NURSE PRACTITIONER

## 2024-12-26 PROCEDURE — 77063 BREAST TOMOSYNTHESIS BI: CPT | Performed by: NURSE PRACTITIONER

## 2025-01-03 ENCOUNTER — HOSPITAL ENCOUNTER (OUTPATIENT)
Dept: MRI IMAGING | Facility: HOSPITAL | Age: 75
Discharge: HOME | End: 2025-01-03
Attending: FAMILY MEDICINE
Payer: MEDICARE

## 2025-01-03 DIAGNOSIS — K74.3 PRIMARY BILIARY CIRRHOSIS (HCC): ICD-10-CM

## 2025-01-03 DIAGNOSIS — I85.00 ESOPHAGEAL VARICES WITHOUT BLEEDING, UNSPECIFIED ESOPHAGEAL VARICES TYPE (HCC): ICD-10-CM

## 2025-01-03 PROCEDURE — A9575 INJ GADOTERATE MEGLUMI 0.1ML: HCPCS | Performed by: FAMILY MEDICINE

## 2025-01-03 PROCEDURE — 74183 MRI ABD W/O CNTR FLWD CNTR: CPT | Performed by: FAMILY MEDICINE

## 2025-01-03 RX ORDER — DIPHENHYDRAMINE HYDROCHLORIDE 50 MG/ML
10 INJECTION, SOLUTION INTRAMUSCULAR; INTRAVENOUS
Status: COMPLETED | OUTPATIENT
Start: 2025-01-03 | End: 2025-01-03

## 2025-01-03 RX ADMIN — DIPHENHYDRAMINE HYDROCHLORIDE 10 ML: 50 INJECTION, SOLUTION INTRAMUSCULAR; INTRAVENOUS at 16:10:00

## 2025-02-03 ENCOUNTER — OFFICE VISIT (OUTPATIENT)
Dept: SURGERY | Facility: CLINIC | Age: 75
End: 2025-02-03

## 2025-02-03 DIAGNOSIS — K74.5 BILIARY CIRRHOSIS (HCC): Primary | ICD-10-CM

## 2025-02-03 DIAGNOSIS — K74.02 HEPATIC FIBROSIS, ADVANCED FIBROSIS: ICD-10-CM

## 2025-02-06 RX ORDER — SPIRONOLACTONE 50 MG/1
50 TABLET, FILM COATED ORAL DAILY
COMMUNITY

## 2025-02-06 RX ORDER — PROPRANOLOL HYDROCHLORIDE 10 MG/1
5 TABLET ORAL 2 TIMES DAILY
COMMUNITY

## 2025-02-20 ENCOUNTER — ANESTHESIA (OUTPATIENT)
Dept: ENDOSCOPY | Facility: HOSPITAL | Age: 75
End: 2025-02-20
Payer: MEDICARE

## 2025-02-20 ENCOUNTER — HOSPITAL ENCOUNTER (OUTPATIENT)
Facility: HOSPITAL | Age: 75
Setting detail: HOSPITAL OUTPATIENT SURGERY
Discharge: HOME | End: 2025-02-20
Attending: STUDENT IN AN ORGANIZED HEALTH CARE EDUCATION/TRAINING PROGRAM | Admitting: STUDENT IN AN ORGANIZED HEALTH CARE EDUCATION/TRAINING PROGRAM
Payer: MEDICARE

## 2025-02-20 ENCOUNTER — ANESTHESIA EVENT (OUTPATIENT)
Dept: ENDOSCOPY | Facility: HOSPITAL | Age: 75
End: 2025-02-20
Payer: MEDICARE

## 2025-02-20 VITALS
OXYGEN SATURATION: 100 % | TEMPERATURE: 98 F | WEIGHT: 109 LBS | RESPIRATION RATE: 16 BRPM | HEIGHT: 60 IN | DIASTOLIC BLOOD PRESSURE: 57 MMHG | SYSTOLIC BLOOD PRESSURE: 108 MMHG | BODY MASS INDEX: 21.4 KG/M2 | HEART RATE: 59 BPM

## 2025-02-20 PROCEDURE — 0DJ08ZZ INSPECTION OF UPPER INTESTINAL TRACT, VIA NATURAL OR ARTIFICIAL OPENING ENDOSCOPIC: ICD-10-PCS | Performed by: STUDENT IN AN ORGANIZED HEALTH CARE EDUCATION/TRAINING PROGRAM

## 2025-02-20 RX ORDER — SODIUM CHLORIDE, SODIUM LACTATE, POTASSIUM CHLORIDE, CALCIUM CHLORIDE 600; 310; 30; 20 MG/100ML; MG/100ML; MG/100ML; MG/100ML
INJECTION, SOLUTION INTRAVENOUS CONTINUOUS
Status: DISCONTINUED | OUTPATIENT
Start: 2025-02-20 | End: 2025-02-20

## 2025-02-20 RX ORDER — NALOXONE HYDROCHLORIDE 0.4 MG/ML
0.08 INJECTION, SOLUTION INTRAMUSCULAR; INTRAVENOUS; SUBCUTANEOUS ONCE AS NEEDED
Status: DISCONTINUED | OUTPATIENT
Start: 2025-02-20 | End: 2025-02-20

## 2025-02-20 RX ORDER — LIDOCAINE HYDROCHLORIDE 10 MG/ML
INJECTION, SOLUTION EPIDURAL; INFILTRATION; INTRACAUDAL; PERINEURAL AS NEEDED
Status: DISCONTINUED | OUTPATIENT
Start: 2025-02-20 | End: 2025-02-20 | Stop reason: SURG

## 2025-02-20 RX ADMIN — LIDOCAINE HYDROCHLORIDE 50 MG: 10 INJECTION, SOLUTION EPIDURAL; INFILTRATION; INTRACAUDAL; PERINEURAL at 08:30:00

## 2025-02-20 RX ADMIN — SODIUM CHLORIDE, SODIUM LACTATE, POTASSIUM CHLORIDE, CALCIUM CHLORIDE: 600; 310; 30; 20 INJECTION, SOLUTION INTRAVENOUS at 08:40:00

## 2025-02-20 NOTE — DISCHARGE INSTRUCTIONS
Per Dr Lewis:  There are very small varices noted in the esophagus.  These are very low risk for bleeding.  No new medications or changes to your plan.  Repeat EGD in 2 years.    Home Care Instructions for Gastroscopy with Sedation    Diet:  - Resume your regular diet as tolerated unless otherwise instructed.  - Start with light meals to minimize bloating.  - Do not drink alcohol today.    Medication:  - If you have questions about resuming your normal medications, please contact your Primary Care Physician.    Activities:  - Take it easy today. Do not return to work today.  - Do not drive today.  - Do not operate any machinery today (including kitchen equipment).    Gastroscopy:  - You may have a sore throat for 2-3 days following the exam. This is normal. Gargling with warm salt water (1/2 tsp salt to 1 glass warm water) or using throat lozenges will help.  - If you experience any sharp pain in your neck, abdomen or chest, vomiting of blood, oral temperature over 100 degrees Fahrenheit, light-headedness or dizziness, or any other problems, contact your doctor.    **If unable to reach your doctor, please go to the [facility] Emergency Room**    - Your referring physician will receive a full report of your examination.  - If you do not hear from your doctor's office within two weeks of your biopsy, please call them for your results.    You may be able to see your laboratory results in [employer] between 4 and 7 business days.  In some cases, your physician may not have viewed the results before they are released to [employer].  If you have questions regarding your results contact the physician who ordered the test/exam by phone or via [employer] by choosing \"Ask a Medical Question.\"

## 2025-02-20 NOTE — ANESTHESIA PREPROCEDURE EVALUATION
PRE-OP EVALUATION    Patient Name: Barbara Phoenix    Admit Diagnosis: Esophageal varices without bleeding, unspecified esophageal varices type (HCC) [I85.00]  Primary biliary cirrhosis (HCC) [K74.3]    Pre-op Diagnosis: Esophageal varices without bleeding, unspecified esophageal varices type (HCC) [I85.00]  Primary biliary cirrhosis (HCC) [K74.3]    ESOPHAGOGASTRODUODENOSCOPY (EGD)    Anesthesia Procedure: ESOPHAGOGASTRODUODENOSCOPY (EGD)    Surgeons and Role:     * Lazaro Lewis MD - Primary    Pre-op vitals reviewed.  Temp: 98.3 °F (36.8 °C)  Pulse: 52  Resp: 18  BP: 135/50  SpO2: 100 %  Body mass index is 21.29 kg/m².    Current medications reviewed.  Hospital Medications:   lactated ringers infusion   Intravenous Continuous       Outpatient Medications:   Prescriptions Prior to Admission[1]    Allergies: Ciprofloxacin      Anesthesia Evaluation    Patient summary reviewed.    Anesthetic Complications  (-) history of anesthetic complications         GI/Hepatic/Renal                (+) liver disease (PBC)                 Cardiovascular        Exercise tolerance: good     MET: >4      (+) hypertension     (+) CAD                                Endo/Other           (+) hypothyroidism                       Pulmonary    Negative pulmonary ROS.                       Neuro/Psych    Negative neuro/psych ROS.                                  Past Surgical History:   Procedure Laterality Date    Angiogram      Colonoscopy  2010    Colonoscopy      Colonoscopy N/A 2018    Procedure: COLONOSCOPY;  Surgeon: Vinicio Delgado MD;  Location:  ENDOSCOPY    [facility]      Needle biopsy liver        , ,      Tonsillectomy      as a child    Upper gi endoscopy,exam  2010     Social History     Socioeconomic History    Marital status:    Tobacco Use    Smoking status: Former     Current packs/day: 0.00     Types: Cigarettes    Smokeless tobacco: Never    Tobacco comments:     Very light  smoker my 20's & late 30's   Vaping Use    Vaping status: Never Used   Substance and Sexual Activity    Alcohol use: Not Currently     Comment: Rare; 3-4 drinks a YEAR.    Drug use: No    Sexual activity: Never   Other Topics Concern    Caffeine Concern Yes    Stress Concern Yes    Weight Concern No    Special Diet No    Exercise No    Seat Belt Yes     History   Drug Use No     Available pre-op labs reviewed.               Airway      Mallampati: II  Mouth opening: >3 FB  TM distance: > 6 cm  Neck ROM: full Cardiovascular    Cardiovascular exam normal.  Rhythm: regular  Rate: normal     Dental             Pulmonary    Pulmonary exam normal.                 Other findings              ASA: 3   Plan: MAC  NPO status verified and patient meets guidelines.    Post-procedure pain management plan discussed with surgeon and patient.      Plan/risks discussed with: patient                Present on Admission:  **None**             [1]   Medications Prior to Admission   Medication Sig Dispense Refill Last Dose/Taking    spironolactone 50 MG Oral Tab Take 1 tablet (50 mg total) by mouth daily.   2/19/2025    propranolol 10 MG Oral Tab Take 0.5 tablets (5 mg total) by mouth 2 (two) times daily.   2/19/2025 at  4:00 PM    losartan 50 MG Oral Tab Take 0.5 tablets (25 mg total) by mouth daily.   Past Month    OMEPRAZOLE 20 MG Oral Capsule Delayed Release TAKE 1 CAPSULE BY MOUTH 30 MINUTES BEFORE DINNER 90 capsule 3 2/19/2025    furosemide 40 MG Oral Tab Take 1 tablet (40 mg total) by mouth daily.   2/19/2025    Ursodiol 500 MG Oral Tab Take 1.5 tablets (750 mg total) by mouth daily. 135 tablet 3 2/19/2025    azaTHIOprine 50 MG Oral Tab Take 0.5 tablets (25 mg total) by mouth daily. 45 tablet 3 2/19/2025    levothyroxine 25 MCG Oral Tab Take 1 tablet (25 mcg total) by mouth before breakfast. 90 tablet 3 Past Month    Vitamin B-12 1000 MCG Oral Tab Take 1 tablet (1,000 mcg total) by mouth daily.   Past Month    aspirin 81 MG Oral  Tab Take 1 tablet (81 mg total) by mouth daily.   2/19/2025 at  4:00 PM    Vitamin D3 1000 UNITS Oral Tab Take 3 tablets (3,000 Units total) by mouth daily.   Past Month    Calcium Carbonate-Vitamin D 600-400 MG-UNIT Oral Tab Take 1 tablet by mouth 2 (two) times daily.   2/18/2025    ibuprofen 200 MG Oral Tab Take 1 tablet (200 mg total) by mouth every 6 (six) hours as needed for Pain.   More than a month

## 2025-02-20 NOTE — OPERATIVE REPORT
EGD operative report  Patient Name: Barbara Phoenix  Date: 2/20/2025  Procedure: Esophagogastroduodenoscopy   Pre-Op Diagnosis: Cirrhosis  Post-Op Diagnosis: Small esophageal varices, non-bleeding portal hypertensive gastropathy  Attending: Lazaro Lewis M.D.  Consent:  The risks, benefits, and alternatives were discussed with the patient / POA.  Risks included, but were not limited to, bleeding, perforation, medication effects, cardiac arrhythmias, and aspiration.  After all questions were answered to their satisfaction, a signed, informed, and witnessed consent was obtained.  Sedation: Monitored Anesthesia Care  Monitoring:  Pulsoximetry, pulse, respirations, and blood pressure were monitored throughout the entire procedure  Procedure: After achieving adequate sedation and placing the patient in the left lateral decubitus position, the lubricated upper endoscope was introduced into the mouth and advanced to the descending duodenum.  The endoscope was then withdrawn into the gastric antrum and placed in a retroflexed position.  The endoscope was then righted, and air was suctioned from the stomach.  The endoscope was then withdrawn from the patient, with careful visual inspection of the mucosa revealing no additional pathologic findings.  The patient tolerated the procedure without apparent procedural complications.  The patient left the procedure room in stable condition for recovery.  Findings:   Esophagus: The mucosa was normal.  There were three columns of small varices which instantly deflated upon air insufflation.  These only extended 4-5 cm proximal to the GE junction.  There were no stigmata of bleeding.  GE junction: The GE junction is normal.  Stomach:  There is mild mucosal edema throughout the gastric body.  The gastric antrum, fundus, cardia, and angularis were normal.    Duodenum: The duodenal bulb, post-bulbar duodenum, and descending duodenum were normal.    Impression: Findings as  above  Recommendations:   Repeat EGD in 2 years.  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    Lazaro Lewis MD

## 2025-02-20 NOTE — H&P
Gastroenterology Procedural H/P  I have personally seen and examined the patient.    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  She follows with Dr Rai.  Due for EGD for variceal screening.  PMHx:   Past Medical History:    Abnormal TSH    \"borderline\"    Aortic stenosis    Arthritis    Arthritis    hands    Atypical mole    Back pain    Back problem    low back discomfort    Bad breath    Blood disorder    thrombocytopenia and leukopenia    Blood in urine    Blurred vision    Blurred vision. Cataracts per MD.    Cataract    Cholelithiasis    Clostridium difficile diarrhea    Colitis    Congestive heart disease (HCC)    Disorder of thyroid    Dizziness    Dysuria    Easy bruising    Esophageal reflux    Fatigue    Occasional    Frequent UTI    4 UTI’s in last 2 years    Gastritis    Hearing impaired person, bilateral    some loss    Hearing loss    Heart murmur    Heart palpitations    Heart valve disease    Hemorrhoids    Hepatitis    autoimmune hepatitis-many years    High blood pressure    History of cardiac murmur    Internal hemorrhoids    Leg swelling    Leukopenia    Nausea    Osteoporosis    Pain in joints    Hands, back, hips.    Primary biliary cirrhosis (HCC)    primary biliary cirrhosis    Skin lesion    shoulder, intradermal melanocytic nevus w/congenital features    Visual impairment    glasses    Wears glasses    Weight gain    Sudden onset of rapid weight gain (9-10 lbs.)     PSHx:   Past Surgical History:   Procedure Laterality Date    Angiogram      Colonoscopy  2010    Colonoscopy      Colonoscopy N/A 2018    Procedure: COLONOSCOPY;  Surgeon: Vinicio Delgado MD;  Location:  ENDOSCOPY    Egd      Needle biopsy liver        , ,      Tonsillectomy      as a child    Upper gi endoscopy,exam  2010     Medications:   No current outpatient medications on file.     Allergies: Allergies[1]  SocHx:   There is no heavy or excessive EtOH.  The patient has no IV drug use or other illicit substances.  FamHx: The patient has no family history of colon cancer.  ROS:  A comprehensive 10 point review of systems was completed.  Pertinent positives and negatives noted in the the HPI.     PE: /50 (BP Location: Left arm)   Pulse 52   Temp 98.3 °F (36.8 °C) (Temporal)   Resp 18   Ht 5' (1.524 m)   Wt 109 lb (49.4 kg)   LMP  (LMP Unknown)   SpO2 100%   BMI 21.29 kg/m²   Gen: AAO x 3, able to speak in complete sentences  HENT: NCAT, oropharynx is clear with moist mucosal membranes  Eyes: Sclerae are anicteric  CV: Regular rate and rhythm, no murmurs  Resp: Clear to auscultation bilaterally without wheezes; rubs, rhonchi, or rales  Abdomen: Soft, non-tender, non-distended with the presence of bowel sounds; No hepatosplenomegaly; no rebound or guarding; No ascites is clinically apparent; no tympany to percussion  Ext: No peripheral edema or cyanosis  Skin: Warm and dry  Psychiatric: Appropriate mood and congruent affect without obvious depression or anxiety    Impression:   Cirrhosis (PBC/AIH)     Recommendations:   EGD with DAMION Lewis MD         [1]   Allergies  Allergen Reactions    Ciprofloxacin ITCHING and PAIN     Caused vaginal pain and itching.

## 2025-02-20 NOTE — ANESTHESIA POSTPROCEDURE EVALUATION
[facility]    Barbara Phoenix Patient Status:  Hospital Outpatient Surgery   Age/Gender 74 year old female MRN [id number]   Location [facility] Attending Lazaro Lewis MD   Hosp Day # 0 PCP Zenia Astudillo MD       Anesthesia Post-op Note    ESOPHAGOGASTRODUODENOSCOPY (EGD)    Procedure Summary       Date: 02/20/25 Room / Location:  ENDOSCOPY 02 /  ENDOSCOPY    Anesthesia Start: 0829 Anesthesia Stop:     Procedure: ESOPHAGOGASTRODUODENOSCOPY (EGD) Diagnosis:       Esophageal varices without bleeding, unspecified esophageal varices type (HCC)      Primary biliary cirrhosis (HCC)      (small esophageal varices, nonbleeding portal hypertensive gastropathy)    Surgeons: Lazaro Lewis MD Anesthesiologist: Amador Burt MD    Anesthesia Type: MAC ASA Status: 3            Anesthesia Type: MAC    Vitals Value Taken Time   BP 89/40 02/20/25 0839   Temp  02/20/25 0840   Pulse 56 02/20/25 0839   Resp 16 02/20/25 0839   SpO2 100 % 02/20/25 0839           Patient Location: Endoscopy    Anesthesia Type: MAC    Airway Patency: patent    Postop Pain Control: adequate    Mental Status: mildly sedated but able to meaningfully participate in the post-anesthesia evaluation    Nausea/Vomiting: none    Cardiopulmonary/Hydration status: stable euvolemic    Complications: no apparent anesthesia related complications    Postop vital signs: stable    Dental Exam: Unchanged from Preop    Patient to be discharged home when criteria met.

## 2025-04-09 ENCOUNTER — LAB ENCOUNTER (OUTPATIENT)
Dept: LAB | Age: 75
End: 2025-04-09
Attending: INTERNAL MEDICINE
Payer: MEDICARE

## 2025-04-09 DIAGNOSIS — K74.5 BILIARY CIRRHOSIS (HCC): ICD-10-CM

## 2025-04-09 DIAGNOSIS — K74.02 HEPATIC FIBROSIS, ADVANCED FIBROSIS: ICD-10-CM

## 2025-04-09 DIAGNOSIS — D47.2 MONOCLONAL GAMMOPATHY: ICD-10-CM

## 2025-04-09 DIAGNOSIS — D72.819 LEUKOPENIA, UNSPECIFIED TYPE: ICD-10-CM

## 2025-04-09 LAB
AFP-TM SERPL-MCNC: 8.8 NG/ML (ref ?–8)
ALBUMIN SERPL-MCNC: 3.2 G/DL (ref 3.2–4.8)
ALBUMIN/GLOB SERPL: 0.8 {RATIO} (ref 1–2)
ALP LIVER SERPL-CCNC: 126 U/L (ref 55–142)
ALT SERPL-CCNC: 33 U/L (ref 10–49)
ANION GAP SERPL CALC-SCNC: 10 MMOL/L (ref 0–18)
AST SERPL-CCNC: 75 U/L (ref ?–34)
BASOPHILS # BLD AUTO: 0.02 X10(3) UL (ref 0–0.2)
BASOPHILS NFR BLD AUTO: 1.2 %
BILIRUB DIRECT SERPL-MCNC: 1.1 MG/DL (ref ?–0.3)
BILIRUB SERPL-MCNC: 2.7 MG/DL (ref 0.2–1.1)
BUN BLD-MCNC: 14 MG/DL (ref 9–23)
CALCIUM BLD-MCNC: 9.2 MG/DL (ref 8.7–10.6)
CHLORIDE SERPL-SCNC: 108 MMOL/L (ref 98–112)
CO2 SERPL-SCNC: 23 MMOL/L (ref 21–32)
CREAT BLD-MCNC: 1.05 MG/DL (ref 0.55–1.02)
EGFRCR SERPLBLD CKD-EPI 2021: 55 ML/MIN/1.73M2 (ref 60–?)
EOSINOPHIL # BLD AUTO: 0.06 X10(3) UL (ref 0–0.7)
EOSINOPHIL NFR BLD AUTO: 3.6 %
ERYTHROCYTE [DISTWIDTH] IN BLOOD BY AUTOMATED COUNT: 13.5 %
FASTING STATUS PATIENT QL REPORTED: YES
GLOBULIN PLAS-MCNC: 4 G/DL (ref 2–3.5)
GLUCOSE BLD-MCNC: 86 MG/DL (ref 70–99)
HCT VFR BLD AUTO: 37 % (ref 35–48)
HGB BLD-MCNC: 12.7 G/DL (ref 12–16)
IGA SERPL-MCNC: 460.1 MG/DL (ref 40–350)
IGM SERPL-MCNC: 382 MG/DL (ref 50–300)
IMM GRANULOCYTES # BLD AUTO: 0 X10(3) UL (ref 0–1)
IMM GRANULOCYTES NFR BLD: 0 %
IMMUNOGLOBULIN PNL SER-MCNC: 2092 MG/DL (ref 650–1600)
LYMPHOCYTES # BLD AUTO: 0.38 X10(3) UL (ref 1–4)
LYMPHOCYTES NFR BLD AUTO: 22.8 %
MCH RBC QN AUTO: 35 PG (ref 26–34)
MCHC RBC AUTO-ENTMCNC: 34.3 G/DL (ref 31–37)
MCV RBC AUTO: 101.9 FL (ref 80–100)
MONOCYTES # BLD AUTO: 0.19 X10(3) UL (ref 0.1–1)
MONOCYTES NFR BLD AUTO: 11.4 %
NEUTROPHILS # BLD AUTO: 1.02 X10 (3) UL (ref 1.5–7.7)
NEUTROPHILS # BLD AUTO: 1.02 X10(3) UL (ref 1.5–7.7)
NEUTROPHILS NFR BLD AUTO: 61 %
OSMOLALITY SERPL CALC.SUM OF ELEC: 292 MOSM/KG (ref 275–295)
PLATELET # BLD AUTO: 85 10(3)UL (ref 150–450)
PLATELETS.RETICULATED NFR BLD AUTO: 3.3 % (ref 0–7)
POTASSIUM SERPL-SCNC: 3.8 MMOL/L (ref 3.5–5.1)
PROT SERPL-MCNC: 7.2 G/DL (ref 5.7–8.2)
RBC # BLD AUTO: 3.63 X10(6)UL (ref 3.8–5.3)
SODIUM SERPL-SCNC: 141 MMOL/L (ref 136–145)
WBC # BLD AUTO: 1.7 X10(3) UL (ref 4–11)

## 2025-04-09 PROCEDURE — 83521 IG LIGHT CHAINS FREE EACH: CPT

## 2025-04-09 PROCEDURE — 80053 COMPREHEN METABOLIC PANEL: CPT

## 2025-04-09 PROCEDURE — 82784 ASSAY IGA/IGD/IGG/IGM EACH: CPT

## 2025-04-09 PROCEDURE — 36415 COLL VENOUS BLD VENIPUNCTURE: CPT

## 2025-04-09 PROCEDURE — 85025 COMPLETE CBC W/AUTO DIFF WBC: CPT

## 2025-04-09 PROCEDURE — 86334 IMMUNOFIX E-PHORESIS SERUM: CPT

## 2025-04-09 PROCEDURE — 84165 PROTEIN E-PHORESIS SERUM: CPT

## 2025-04-09 PROCEDURE — 82248 BILIRUBIN DIRECT: CPT

## 2025-04-09 PROCEDURE — 82105 ALPHA-FETOPROTEIN SERUM: CPT

## 2025-04-10 DIAGNOSIS — E03.8 OTHER SPECIFIED HYPOTHYROIDISM: ICD-10-CM

## 2025-04-14 ENCOUNTER — OFFICE VISIT (OUTPATIENT)
Age: 75
End: 2025-04-14
Attending: INTERNAL MEDICINE
Payer: MEDICARE

## 2025-04-14 VITALS
HEART RATE: 69 BPM | WEIGHT: 110.19 LBS | TEMPERATURE: 98 F | BODY MASS INDEX: 22 KG/M2 | RESPIRATION RATE: 16 BRPM | DIASTOLIC BLOOD PRESSURE: 63 MMHG | OXYGEN SATURATION: 98 % | SYSTOLIC BLOOD PRESSURE: 136 MMHG

## 2025-04-14 DIAGNOSIS — D47.2 MGUS (MONOCLONAL GAMMOPATHY OF UNKNOWN SIGNIFICANCE): Primary | ICD-10-CM

## 2025-04-14 DIAGNOSIS — D72.819 LEUKOPENIA, UNSPECIFIED TYPE: ICD-10-CM

## 2025-04-14 NOTE — PROGRESS NOTES
Cancer Center Progress Note  Patient Name: Barbara Phoenix   YOB: 1950   Medical Record Number: [id number]     Attending Physician: Ruslan Clay M.D.       Date of Visit: 10/21/2024          Chief Complaint:  No chief complaint on file.       Oncologic History:  Barbara Phoenix is a 74 year old female referred by Dr. Ruggiero and Dr. Oh for evaluation of her leukopenia and thrombocytopenia.  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  She has chronic splenomegaly and has had chronic leukopenia.  Over the past few year, she has developed thrombocytopenia, as well.  She was seen by Dr. Oh, hepatologist, a few days ago who noted that her leukopenia and thrombocytopenia are somewhat lower than would be expected for the severity of her liver disease and splenomegaly.  A monoclonal protein study obtained in June had demonstrated a polyclonal elevation of her free light chains, but with a slightly elevated K:L ratio. Her creatinine was normal. She denies bone pain. No F/C/NS.  No palpable LAD.  No wt loss.   She has long standing osteoporosis and had a \"stress\" fracture of the ankle in 2014.      History of Present Illness:  Pt is here for follow up. She feels well.  No F/C/NS.   Energy is stable.    Performance Status:  ECOG 0    Past Medical History:  Past Medical History:    Abnormal TSH    \"borderline\"    Aortic stenosis    Arthritis    Arthritis    hands    Atypical mole    Back pain    Back problem    low back discomfort    Bad breath    Blood disorder    thrombocytopenia and leukopenia    Blood in urine    Blurred vision    Blurred vision. Cataracts per MD.    Cataract    Cholelithiasis    Clostridium difficile diarrhea    Colitis    Congestive heart disease (HCC)    Disorder of thyroid    Dizziness    Dysuria    Easy bruising    Esophageal reflux    Fatigue    Occasional    Frequent UTI    4 UTI’s in last 2 years    Gastritis    Hearing  impaired person, bilateral    some loss    Hearing loss    Heart murmur    Heart palpitations    Heart valve disease    Hemorrhoids    Hepatitis    autoimmune hepatitis-many years    High blood pressure    History of cardiac murmur    Internal hemorrhoids    Leg swelling    Leukopenia    Nausea    Osteoporosis    Pain in joints    Hands, back, hips.    Primary biliary cirrhosis (HCC)    primary biliary cirrhosis    Skin lesion    shoulder, intradermal melanocytic nevus w/congenital features    Visual impairment    glasses    Wears glasses    Weight gain    Sudden onset of rapid weight gain (9-10 lbs.)       Past Surgical History:  Past Surgical History:   Procedure Laterality Date    Angiogram      Colonoscopy  2010    Colonoscopy      Colonoscopy N/A 2018    Procedure: COLONOSCOPY;  Surgeon: Vinicio Delgado MD;  Location:  ENDOSCOPY    Egd      Needle biopsy liver        , ,      Tonsillectomy      as a child    Upper gi endoscopy,exam  2010       Family History:  Family History   Problem Relation Age of Onset    Heart Disorder Father     Hypertension Father     Heart Surgery Father 74        CABG    Other (heart disease) Father     Other (htn) Father     Other (cabg) Father     Other (copd) Mother         not a smoker    Other (osteopenia) Mother     Other (hemochromatosis) Mother     Other (AAA) Mother     Diabetes Paternal Grandmother     Diabetes Maternal Grandfather     Heart Disorder Paternal Grandfather     Heart Disease Other     Cancer Neg     Stroke Neg        Social History:  Social History     Socioeconomic History    Marital status:      Spouse name: Not on file    Number of children: Not on file    Years of education: Not on file    Highest education level: Not on file   Occupational History    Not on file   Tobacco Use    Smoking status: Former     Current packs/day: 0.00     Types: Cigarettes    Smokeless tobacco: Never    Tobacco comments:     Very  light smoker my 20's & late 30's   Vaping Use    Vaping status: Never Used   Substance and Sexual Activity    Alcohol use: Not Currently     Comment: Rare; 3-4 drinks a YEAR.    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     Social Drivers of Health     Food Insecurity: Not on file   Transportation Needs: Not on file   Housing Stability: Not on file       Current Medications:    Current Outpatient Medications:     spironolactone 50 MG Oral Tab, Take 1 tablet (50 mg total) by mouth daily., Disp: , Rfl:     propranolol 10 MG Oral Tab, Take 0.5 tablets (5 mg total) by mouth 2 (two) times daily., Disp: , Rfl:     losartan 50 MG Oral Tab, Take 0.5 tablets (25 mg total) by mouth daily., Disp: , Rfl:     OMEPRAZOLE 20 MG Oral Capsule Delayed Release, TAKE 1 CAPSULE BY MOUTH 30 MINUTES BEFORE DINNER, Disp: 90 capsule, Rfl: 3    Ursodiol 500 MG Oral Tab, Take 1.5 tablets (750 mg total) by mouth daily., Disp: 135 tablet, Rfl: 3    azaTHIOprine 50 MG Oral Tab, Take 0.5 tablets (25 mg total) by mouth daily., Disp: 45 tablet, Rfl: 3    levothyroxine 25 MCG Oral Tab, Take 1 tablet (25 mcg total) by mouth before breakfast., Disp: 90 tablet, Rfl: 3    Vitamin B-12 1000 MCG Oral Tab, Take 1 tablet (1,000 mcg total) by mouth daily., Disp: , Rfl:     aspirin 81 MG Oral Tab, Take 1 tablet (81 mg total) by mouth daily., Disp: , Rfl:     Vitamin D3 1000 UNITS Oral Tab, Take 3 tablets (3,000 Units total) by mouth  daily., Disp: , Rfl:     ibuprofen 200 MG Oral Tab, Take 1 tablet (200 mg total) by mouth every 6 (six) hours as needed for Pain., Disp: , Rfl:     Calcium Carbonate-Vitamin D 600-400 MG-UNIT Oral Tab, Take 1 tablet by mouth 2 (two) times daily., Disp: , Rfl:     Allergies:  Allergies   Allergen Reactions    Ciprofloxacin ITCHING and PAIN     Caused vaginal pain and itching.        Review of Systems:    Constitutional No fevers, chills, night sweats, excessive fatigue or weight loss.   Eyes No significant visual difficulties. No diplopia. No yellowing.   Hematologic/Lymphatic Normal - No easy bruising or bleeding.  No tender or palpable lymph nodes.   Respiratory No dyspnea, pleuritic chest pain, cough or hemoptysis.   Cardiovascular No anginal chest pain, palpitations or orthopnea.   Gastrointestinal No nausea, vomiting, diarrhea, GI bleeding, or constipation. NL appetite.   Genitorurinary  No hematuria, dysuria, abnormal bleeding, or incontinence.   Integumentary No rashes or yellowing of the skin   Neurologic No headache, blurred vision, and no areas of focal weakness. Normal gait.   Psychiatric No insomnia, depression, pelon or mood swings.         Vital Signs:  /63 (BP Location: Left arm, Patient Position: Sitting, Cuff Size: adult)   Pulse 69   Temp 97.8 °F (36.6 °C) (Temporal)   Resp 16   Wt 50 kg (110 lb 3.2 oz)   LMP  (LMP Unknown)   SpO2 98%   BMI 21.52 kg/m²        Physical Examination:    Constitutional Normal - Mood and affect appropriate. Appears close to chronological age. Well nourished. Well developed.   Eyes Normal - Conjunctivae and sclerae are clear and without icterus. Pupils are reactive and equal.   Hematologic/Lymphatic Normal - No petechiae or purpura.  No tender or palpable lymph nodes in the cervical, supraclavicular, axillary or inguinal area.   Respiratory Normal - Lungs CTA, no rhonchi or wheezing.   Cardiovascular Normal - RRR, no murmurs, gallops or rubs.   Abdomen Normal  - Non-tender, non-distended, no masses, ascites or hepatosplenomegaly.    Extremities Normal - No C/C/E    Integumentary Normal - No rashes, No Jaundice   Neurologic Normal - No sensory or motor deficits, normal cerebellar function, normal gait, cranial nerves intact.   Psychiatric Normal - A&Ox3, Coherent speech. Verbalizes understanding of our discussions today.         Laboratory:  24-hour urine concentrated 50X is negative for Free Monoclonal Light Chains (Bence Nixon Protein   Latest Reference Range & Units 04/09/25 12:07   Glucose 70 - 99 mg/dL 86   Sodium 136 - 145 mmol/L 141   Potassium 3.5 - 5.1 mmol/L 3.8   Chloride 98 - 112 mmol/L 108   Carbon Dioxide, Total 21.0 - 32.0 mmol/L 23.0   BUN 9 - 23 mg/dL 14   CREATININE 0.55 - 1.02 mg/dL 1.05 (H)   CALCIUM 8.7 - 10.6 mg/dL 9.2   EGFR >=60 mL/min/1.73m2 55 (L)   ANION GAP 0 - 18 mmol/L 10   CALCULATED OSMOLALITY 275 - 295 mOsm/kg 292   ALKALINE PHOSPHATASE 55 - 142 U/L 126   AST (SGOT) <34 U/L 75 (H)   ALT (SGPT) 10 - 49 U/L 33   Total Bilirubin 0.2 - 1.1 mg/dL 2.7 (H)   Bilirubin, Direct <=0.3 mg/dL 1.1 (H)   Globulin 2.0 - 3.5 g/dL 4.0 (H)   (H): Data is abnormally high  (L): Data is abnormally low     Latest Reference Range & Units 04/09/25 12:07   WBC 4.0 - 11.0 x10(3) uL 1.7 (L)   Hemoglobin 12.0 - 16.0 g/dL 12.7   Hematocrit 35.0 - 48.0 % 37.0   Platelet Count 150.0 - 450.0 10(3)uL 85.0 (L)   RBC 3.80 - 5.30 x10(6)uL 3.63 (L)   MCH 26.0 - 34.0 pg 35.0 (H)   MCHC 31.0 - 37.0 g/dL 34.3   MCV 80.0 - 100.0 fL 101.9 (H)   RDW % 13.5   Immature Platelet Fraction 0.0 - 7.0 % 3.3   Prelim Neutrophil Abs 1.50 - 7.70 x10 (3) uL 1.02 (L)   Neutrophils Absolute 1.50 - 7.70 x10(3) uL 1.02 (L)   Lymphocytes Absolute 1.00 - 4.00 x10(3) uL 0.38 (L)   Monocytes Absolute 0.10 - 1.00 x10(3) uL 0.19   Eosinophils Absolute 0.00 - 0.70 x10(3) uL 0.06   Basophils Absolute 0.00 - 0.20 x10(3) uL 0.02   Immature Granulocyte Absolute 0.00 - 1.00 x10(3) uL 0.00   Neutrophils % %  61.0   Lymphocytes % % 22.8   Monocytes % % 11.4   Eosinophils % % 3.6   Basophils % % 1.2   Immature Granulocyte % % 0.0   (L): Data is abnormally low  (H): Data is abnormally high      Radiology:        Pathology:  Final Diagnosis:   Bone marrow core biopsy, clot section, and aspirate:  -Normocellular bone marrow with multilineage hematopoiesis and decreased storage iron.         Impression and Plan:  Leukopenia and Thrombocytopenia: The patient has splenomegaly and liver disease that likely explain the low platelets and WBC.  Counts wax and wane, but overall stable. 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 She developed a monoclonal IgM lambda.The workup for a low grade lymphoma, including CT C/A/P, 24 hr urine for Bence Nixon proteins, and a bone marrow biopsy were all negative.    Planned Follow Up:  6 months    The patient and I have a longitudinal relationship to address/treat this serious and complex condition      Electronically Signed by:    Ruslan Clay M.D.  [facility]

## 2025-04-15 RX ORDER — LOSARTAN POTASSIUM 50 MG/1
25 TABLET ORAL DAILY
Qty: 45 TABLET | Refills: 1 | Status: SHIPPED | OUTPATIENT
Start: 2025-04-15

## 2025-04-15 RX ORDER — LEVOTHYROXINE SODIUM 25 UG/1
25 TABLET ORAL
Qty: 90 TABLET | Refills: 1 | Status: SHIPPED | OUTPATIENT
Start: 2025-04-15

## 2025-04-15 NOTE — TELEPHONE ENCOUNTER
Please review;    Z Plasty Text: The lesion was extirpated to the level of the fat with a #15 scalpel blade.  Given the location of the defect, shape of the defect and the proximity to free margins a Z-plasty was deemed most appropriate for repair.  Using a sterile surgical marker, the appropriate transposition arms of the Z-plasty were drawn incorporating the defect and placing the expected incisions within the relaxed skin tension lines where possible.    The area thus outlined was incised deep to adipose tissue with a #15 scalpel blade.  The skin margins were undermined to an appropriate distance in all directions utilizing iris scissors.  The opposing transposition arms were then transposed into place in opposite direction and anchored with interrupted buried subcutaneous sutures.

## 2025-04-17 LAB
ALBUMIN SERPL ELPH-MCNC: 3.09 G/DL (ref 3.75–5.21)
ALBUMIN/GLOB SERPL: 0.88 {RATIO} (ref 1–2)
ALPHA1 GLOB SERPL ELPH-MCNC: 0.18 G/DL (ref 0.19–0.46)
ALPHA2 GLOB SERPL ELPH-MCNC: 0.43 G/DL (ref 0.48–1.05)
B-GLOBULIN SERPL ELPH-MCNC: 0.73 G/DL (ref 0.68–1.23)
GAMMA GLOB SERPL ELPH-MCNC: 2.17 G/DL (ref 0.62–1.7)
KAPPA LC FREE SER-MCNC: 7.22 MG/DL (ref 0.33–1.94)
KAPPA LC FREE/LAMBDA FREE SER NEPH: 0.97 {RATIO} (ref 0.26–1.65)
LAMBDA LC FREE SERPL-MCNC: 7.41 MG/DL (ref 0.57–2.63)
PROT SERPL-MCNC: 6.6 G/DL (ref 5.7–8.2)

## 2025-07-08 ENCOUNTER — MED REC SCAN ONLY (OUTPATIENT)
Dept: INTERNAL MEDICINE CLINIC | Facility: CLINIC | Age: 75
End: 2025-07-08

## 2025-07-18 NOTE — PROGRESS NOTES
"RT Protocol Note  Shayla Davies 33 y.o. female MRN: [id number]  Unit/Bed#: -01 Encounter: [id number]    Assessment    Principal Problem:    Epigastric pain  Active Problems:    History of bariatric surgery    Nicotine dependence    Bipolar disorder (HCC)    Alcohol use disorder    Pain due to dental trauma      Home Pulmonary Medications:  None       Past Medical History[1]  Social History[2]    Subjective         Objective    Physical Exam:   Assessment Type: Assess only  General Appearance: Awake  Respiratory Pattern: Normal  Chest Assessment: Chest expansion symmetrical  Bilateral Breath Sounds: Clear, Diminished    Vitals:  Blood pressure 104/66, pulse 84, temperature 97.8 °F (36.6 °C), temperature source Oral, resp. rate 18, height 5' 5\" (1.651 m), weight 85.3 kg (188 lb), last menstrual period 2025, SpO2 95%, not currently breastfeeding.          Imaging and other studies:           Plan    Respiratory Plan: No distress/Pulmonary history, Discontinue Protocol        Resp Comments: Assessed pt per protocol. Pt has no pulmonary history. Currently on RA. Respiratory treatment not indicated. D/C protocol.        [1]   Past Medical History:  Diagnosis Date    Abnormal Pap smear of cervix     ASCUS , ASCUS cannot exclude HGIL     Anemia     Depression     hx of depression     GERD (gastroesophageal reflux disease)     resolved per pt     History of removal of ovarian cyst     Increased BMI     Shingles     with pregnancy    Subcutaneous cyst     Varicella     had chicken pox as child/shingles with pregnancy   [2]   Social History  Socioeconomic History    Marital status: Single   Tobacco Use    Smoking status: Former     Current packs/day: 0.00     Average packs/day: 0.5 packs/day for 9.0 years (4.5 ttl pk-yrs)     Types: Cigarettes     Start date: 3/19/2010     Quit date: 3/19/2019     Years since quittin.3    Smokeless tobacco: Never    Tobacco comments:     started to smoke again here " HPI:   Shalini De La Garza is a 66year old female who presents for a Medicare Subsequent Annual Wellness visit (Pt already had Initial Annual Wellness).      Patient with PBC  (sees Dr. Sarah Cuadra and Dr. Kanchan Stroud), HTN, mild CAD, hypothyroidism, HTN, senile osteo Physician (Martina Marrufo)  Kyung Nissen, MD as Consulting Physician (ONCOLOGY)    Patient Active Problem List:     Left ankle swelling     Stress fracture of fibula     Primary biliary cirrhosis (HCC)     Osteoporosis     Gallstone     Leukopenia and there    Vaping Use    Vaping status: Every Day    Substances: THC   Substance and Sexual Activity    Alcohol use: Yes     Comment: 4 shots per day    Drug use: No    Sexual activity: Yes     Partners: Male     Birth control/protection: I.U.D.     Social Drivers of Health     Financial Resource Strain: Medium Risk (4/22/2022)    Overall Financial Resource Strain (CARDIA)     Difficulty of Paying Living Expenses: Somewhat hard   Food Insecurity: No Food Insecurity (7/16/2025)    Nursing - Inadequate Food Risk Classification     Ran Out of Food in the Last Year: Never true   Transportation Needs: No Transportation Needs (7/16/2025)    Nursing - Transportation Risk Classification     Lack of Transportation: No   Stress: Stress Concern Present (4/11/2022)    [employer] - Occupational Stress Questionnaire     Feeling of Stress : To some extent   Intimate Partner Violence: Unknown (7/16/2025)    Nursing IPS     Physically Hurt by Someone: No     Hurt or Threatened by Someone: No   Housing Stability: Unknown (7/16/2025)    Nursing: Inadequate Housing Risk Classification     Unable to Pay for Housing in the Last Year: No     Has Housing: No      1,000 mcg by mouth daily. aspirin 81 MG Oral Tab, Take 81 mg by mouth daily. Vitamin D3 1000 UNITS Oral Tab, Take 3,000 Units by mouth daily. ibuprofen 200 MG Oral Tab, Take 200 mg by mouth every 6 (six) hours as needed for Pain.   Calcium Carbonate-Vi Height as of 9/8/21: 5' 1\" (1.549 m). Weight as of 9/8/21: 116 lb (52.6 kg).     Medicare Hearing Assessment  (Required for AWV/SWV)    {Hearing finger rub wnl     Visual Acuity grossly wnl, b/l cataracts                           General Appearance:  A will repeat tfts in 3 months due to borderline tsh  -     TSH+FREE T4; Future    Esophageal varices without bleeding, unspecified esophageal varices type ([address]- stable on propranolol     Senile osteoporosis- check dexa, pt took reclast but felt she gained tested or if previously tested but not diagnosed with pre-diabetes   One screening every 6 months if diagnosed with pre-diabetes Lab Results   Component Value Date    GLU 88 07/29/2021        Cardiovascular Disease Screening    Lipid Panel  Cholesterol  Pako Alva 09/18/2020    Mammogram due on 09/18/2021    Immunizations    Influenza Covered once per flu season  Please get every year 10/01/2021  No recommendations at this time    Pneumococcal Each vaccine ([id number] & [id number]) covered once after 65 Prevnar 13:

## 2025-07-24 ENCOUNTER — HOSPITAL ENCOUNTER (EMERGENCY)
Facility: HOSPITAL | Age: 75
Discharge: HOME | End: 2025-07-24
Attending: EMERGENCY MEDICINE

## 2025-07-24 VITALS
HEIGHT: 60 IN | OXYGEN SATURATION: 100 % | SYSTOLIC BLOOD PRESSURE: 128 MMHG | BODY MASS INDEX: 21.79 KG/M2 | WEIGHT: 111 LBS | HEART RATE: 58 BPM | RESPIRATION RATE: 20 BRPM | TEMPERATURE: 98 F | DIASTOLIC BLOOD PRESSURE: 64 MMHG

## 2025-07-24 DIAGNOSIS — K91.840 BLEEDING POST TOOTH EXTRACTION: Primary | ICD-10-CM

## 2025-07-24 DIAGNOSIS — E86.0 DEHYDRATION: ICD-10-CM

## 2025-07-24 LAB
ALBUMIN SERPL-MCNC: 3 G/DL (ref 3.2–4.8)
ALBUMIN/GLOB SERPL: 0.6 (ref 1–2)
ALP LIVER SERPL-CCNC: 191 U/L (ref 55–142)
ALT SERPL-CCNC: 85 U/L (ref 10–49)
ANION GAP SERPL CALC-SCNC: 7 MMOL/L (ref 0–18)
APTT PPP: 32.9 SECONDS (ref 23–36)
AST SERPL-CCNC: 176 U/L (ref ?–34)
BASOPHILS # BLD AUTO: 0.02 X10(3) UL (ref 0–0.2)
BASOPHILS NFR BLD AUTO: 0.5 %
BILIRUB SERPL-MCNC: 4.3 MG/DL (ref 0.2–1.1)
BUN BLD-MCNC: 26 MG/DL (ref 9–23)
CALCIUM BLD-MCNC: 9 MG/DL (ref 8.7–10.6)
CHLORIDE SERPL-SCNC: 106 MMOL/L (ref 98–112)
CO2 SERPL-SCNC: 27 MMOL/L (ref 21–32)
CREAT BLD-MCNC: 1.04 MG/DL (ref 0.55–1.02)
EGFRCR SERPLBLD CKD-EPI 2021: 55 ML/MIN/1.73M2 (ref 60–?)
EOSINOPHIL # BLD AUTO: 0.06 X10(3) UL (ref 0–0.7)
EOSINOPHIL NFR BLD AUTO: 1.5 %
ERYTHROCYTE [DISTWIDTH] IN BLOOD BY AUTOMATED COUNT: 14.2 %
GLOBULIN PLAS-MCNC: 4.7 G/DL (ref 2–3.5)
GLUCOSE BLD-MCNC: 97 MG/DL (ref 70–99)
HCT VFR BLD AUTO: 35 % (ref 35–48)
HGB BLD-MCNC: 12.3 G/DL (ref 12–16)
IMM GRANULOCYTES # BLD AUTO: 0.02 X10(3) UL (ref 0–1)
IMM GRANULOCYTES NFR BLD: 0.5 %
INR BLD: 1.34 (ref 0.8–1.2)
LYMPHOCYTES # BLD AUTO: 0.8 X10(3) UL (ref 1–4)
LYMPHOCYTES NFR BLD AUTO: 19.5 %
MCH RBC QN AUTO: 34.4 PG (ref 26–34)
MCHC RBC AUTO-ENTMCNC: 35.1 G/DL (ref 31–37)
MCV RBC AUTO: 97.8 FL (ref 80–100)
MONOCYTES # BLD AUTO: 0.37 X10(3) UL (ref 0.1–1)
MONOCYTES NFR BLD AUTO: 9 %
NEUTROPHILS # BLD AUTO: 2.84 X10 (3) UL (ref 1.5–7.7)
NEUTROPHILS # BLD AUTO: 2.84 X10(3) UL (ref 1.5–7.7)
NEUTROPHILS NFR BLD AUTO: 69 %
OSMOLALITY SERPL CALC.SUM OF ELEC: 295 MOSM/KG (ref 275–295)
PLATELET # BLD AUTO: 94 10(3)UL (ref 150–450)
PLATELETS.RETICULATED NFR BLD AUTO: 3.3 % (ref 0–7)
POTASSIUM SERPL-SCNC: 4.4 MMOL/L (ref 3.5–5.1)
PROT SERPL-MCNC: 7.7 G/DL (ref 5.7–8.2)
PROTHROMBIN TIME: 16.8 SECONDS (ref 11.6–14.8)
RBC # BLD AUTO: 3.58 X10(6)UL (ref 3.8–5.3)
SODIUM SERPL-SCNC: 140 MMOL/L (ref 136–145)
WBC # BLD AUTO: 4.1 X10(3) UL (ref 4–11)

## 2025-07-24 PROCEDURE — 96374 THER/PROPH/DIAG INJ IV PUSH: CPT

## 2025-07-24 PROCEDURE — 96361 HYDRATE IV INFUSION ADD-ON: CPT

## 2025-07-24 PROCEDURE — 99284 EMERGENCY DEPT VISIT MOD MDM: CPT

## 2025-07-24 PROCEDURE — 99285 EMERGENCY DEPT VISIT HI MDM: CPT

## 2025-07-24 PROCEDURE — 85025 COMPLETE CBC W/AUTO DIFF WBC: CPT | Performed by: EMERGENCY MEDICINE

## 2025-07-24 PROCEDURE — 85610 PROTHROMBIN TIME: CPT | Performed by: EMERGENCY MEDICINE

## 2025-07-24 PROCEDURE — 80053 COMPREHEN METABOLIC PANEL: CPT | Performed by: EMERGENCY MEDICINE

## 2025-07-24 PROCEDURE — 85730 THROMBOPLASTIN TIME PARTIAL: CPT | Performed by: EMERGENCY MEDICINE

## 2025-07-24 RX ORDER — ONDANSETRON 4 MG/1
4 TABLET, ORALLY DISINTEGRATING ORAL EVERY 4 HOURS PRN
Qty: 10 TABLET | Refills: 0 | Status: SHIPPED | OUTPATIENT
Start: 2025-07-24

## 2025-07-24 RX ORDER — ONDANSETRON 2 MG/ML
4 INJECTION INTRAMUSCULAR; INTRAVENOUS ONCE
Status: COMPLETED | OUTPATIENT
Start: 2025-07-24 | End: 2025-07-24

## 2025-07-24 RX ORDER — TRANEXAMIC ACID 100 MG/ML
5 INJECTION, SOLUTION INTRAVENOUS ONCE
Status: COMPLETED | OUTPATIENT
Start: 2025-07-24 | End: 2025-07-24

## 2025-07-24 RX ORDER — LIDOCAINE HYDROCHLORIDE AND EPINEPHRINE 10; 10 MG/ML; UG/ML
1 INJECTION, SOLUTION INFILTRATION; PERINEURAL ONCE
Status: COMPLETED | OUTPATIENT
Start: 2025-07-24 | End: 2025-07-24

## 2025-07-24 NOTE — ED PROVIDER NOTES
Procedure Note:    Patient with persistent bleeding from sites of recent dental extraction bilateral lower molars.  Left side bleeding controlled at this time.  Right side with slow persistent venous oozing.    Injected 1cc lidocaine with epinephrine into extraction sites bilaterally.  Applied HemCon dental dressing to both extraction sites and had patient bite down on sterile gauze for 2 minutes.  The dental dressing dislodged from the left extraction site but remains adhered and bleeding significantly improved.  Patient did spit out blood and the dental dressing was dislodged during that time.  No more dental dressings available.  Impregnated dental pledgets with 2cc topical thrombin each and applied to both extraction sites and had patient bite down.  Dr Seals will reassess the patient and determine if further intervention indicated at that time.  Patient tolerated procedure well.

## 2025-07-24 NOTE — ED INITIAL ASSESSMENT (HPI)
Pt had 2 teeth extracted yesterday, has had bleeding with clots since yesterday afternoon, daughter concerned with amount of blood that has come out. No thinners except 81mg ASA, pt protecting airway at this time, denies pain

## 2025-07-25 NOTE — ED PROVIDER NOTES
Patient Seen in: [facility] [place] Emergency Department        History  Chief Complaint   Patient presents with    Dental Problem    Bleeding     Stated Complaint: two teeth pulled yesterday, 81 mg asa, has clots, maintaining airway and secret*    Subjective:   HPI            75-year-old female presents to the emergency department for persistent bleeding after having dental extractions.  Patient has a history of primary biliary cirrhosis and is on baby aspirin.  She has had other teeth pulled within the last few weeks without any difficulties.  She started bleeding pretty persistently after she got home from the procedure.  She was set to go to the oral surgeons office but then started having some emesis and nausea from swallowing blood and therefore they brought her to the emergency department.  No other acute complaints      Objective:     No pertinent past medical history.            No pertinent past surgical history.              No pertinent social history.                              Physical Exam    ED Triage Vitals [07/24/25 1339]   /73   Pulse 77   Resp 18   Temp 97.8 °F (36.6 °C)   Temp src Temporal   SpO2 97 %   O2 Device None (Room air)       Current Vitals:   Vital Signs  BP: 128/64  Pulse: 58  Resp: 20  Temp: 97.8 °F (36.6 °C)  Temp src: Temporal    Oxygen Therapy  SpO2: 100 %  O2 Device: None (Room air)            Physical Exam  Vitals and nursing note reviewed. Chaperone present: Daughter in room assisting with history.   Constitutional:       Appearance: Normal appearance. She is well-developed.   HENT:      Head: Normocephalic and atraumatic.      Mouth/Throat:      Comments: Patient is bleeding from both extraction sites that are from the 2nd or 3rd molars on her lower side bleeding more heavily from the right than on the left but no pulsatile bleeding  Cardiovascular:      Rate and Rhythm: Normal rate and regular rhythm.      Pulses: Normal pulses.      Heart sounds: Normal heart sounds.    Pulmonary:      Effort: Pulmonary effort is normal.      Breath sounds: Normal breath sounds. No stridor. No wheezing.   Abdominal:      General: Bowel sounds are normal.      Palpations: Abdomen is soft.      Tenderness: There is no abdominal tenderness. There is no rebound.   Musculoskeletal:         General: No tenderness. Normal range of motion.      Cervical back: Normal range of motion and neck supple.   Lymphadenopathy:      Cervical: No cervical adenopathy.   Skin:     General: Skin is warm and dry.      Capillary Refill: Capillary refill takes less than 2 seconds.      Coloration: Skin is not pale.   Neurological:      General: No focal deficit present.      Mental Status: She is alert and oriented to person, place, and time.      Cranial Nerves: No cranial nerve deficit.      Coordination: Coordination normal.                ED Course  Labs Reviewed   COMP METABOLIC PANEL (14) - Abnormal; Notable for the following components:       Result Value    BUN 26 (*)     Creatinine 1.04 (*)     eGFR-Cr 55 (*)      (*)     ALT 85 (*)     Alkaline Phosphatase 191 (*)     Bilirubin, Total 4.3 (*)     Albumin 3.0 (*)     Globulin  4.7 (*)     A/G Ratio 0.6 (*)     All other components within normal limits   CBC WITH DIFFERENTIAL WITH PLATELET - Abnormal; Notable for the following components:    RBC 3.58 (*)     PLT 94.0 (*)     MCH 34.4 (*)     Lymphocyte Absolute 0.80 (*)     All other components within normal limits   PROTHROMBIN TIME (PT) - Abnormal; Notable for the following components:    PT 16.8 (*)     INR 1.34 (*)     All other components within normal limits   PTT, ACTIVATED - Normal                            MDM     Patient had an IV established and blood work obtained.  She is placed on a monitor.  She was given IV fluid resuscitation.  Initially with the assistance of the physician assistant she had local infiltration with lidocaine with epinephrine and had the dental topical hemostatic gauze  placed.  Patient had improvement on the left side but continued to bleed on the right.  Subsequently she had then had topical thrombin.  She continued to have oozing she had reinjection of lidocaine with epinephrine by myself.  We then applied TXA and topical lidocaine with epinephrine.  She was evaluated again and had improvement but we had some mild oozing after discussion through the patient's daughter to the oral surgeon the area was oversewn with 5-0 Vicryl.  She had again topical TXA and lidocaine with epinephrine.  Bleeding had stopped.  She we continue to monitor her for another 30 to 45 minutes and she had no further bleeding.  We discussed that if she starts having bleeding at home that she is to swish with ice water and may try placing a teabag.  She was given some pledgets for home.  She is to be in close contact with her oral surgeon.  She is to be on antibiotics that were written by her oral surgeon.    Patient's hemoglobin is stable.  She is thrombocytopenic but not dangerously so but I suspect with her primary biliary cirrhosis she may not have normal platelet function.  At this time we do not see any reason that she requires emergent inpatient admission.  Reviewed care and treatment plan with the patient and her daughter.  They are comfortable with the plan patient was subsequently discharged        Medical Decision Making      Disposition and Plan     Clinical Impression:  1. Bleeding post tooth extraction    2. Dehydration         Disposition:  Discharge  7/24/2025  8:44 pm    Follow-up:  Zenia Astudillo MD  [address] [place] [id number] [phone]    Schedule an appointment as soon as possible for a visit            Medications Prescribed:  Current Discharge Medication List        START taking these medications    Details   ondansetron 4 MG Oral Tablet Dispersible Take 1 tablet (4 mg total) by mouth every 4 (four) hours as needed for Nausea.  Qty: 10 tablet, Refills: 0                    Supplementary Documentation:

## 2025-07-25 NOTE — DISCHARGE INSTRUCTIONS
Stay in contact with your oral surgeon.  May try biting teabags and swishing with ice water if there is any recurrence and bleeding

## (undated) DIAGNOSIS — D69.6 THROMBOCYTOPENIA (HCC): ICD-10-CM

## (undated) DIAGNOSIS — E03.8 SUBCLINICAL HYPOTHYROIDISM: ICD-10-CM

## (undated) DIAGNOSIS — N95.0 POSTMENOPAUSAL BLEEDING: Primary | ICD-10-CM

## (undated) DIAGNOSIS — D70.9 NEUTROPENIA, UNSPECIFIED TYPE (HCC): Primary | ICD-10-CM

## (undated) DIAGNOSIS — D72.819 LEUKOPENIA, UNSPECIFIED TYPE: Primary | ICD-10-CM

## (undated) DEVICE — Device: Brand: DEFENDO AIR/WATER/SUCTION AND BIOPSY VALVE

## (undated) DEVICE — V2 SPECIMEN COLLECTION MANIFOLD KIT: Brand: NEPTUNE

## (undated) DEVICE — ENDOSCOPY PACK UPPER: Brand: MEDLINE INDUSTRIES, INC.

## (undated) DEVICE — FILTERLINE NASAL ADULT O2/CO2

## (undated) DEVICE — 1200CC GUARDIAN II: Brand: GUARDIAN

## (undated) DEVICE — 3M™ RED DOT™ MONITORING ELECTRODE WITH FOAM TAPE AND STICKY GEL, 50/BAG, 20/CASE, 72/PLT 2570: Brand: RED DOT™

## (undated) DEVICE — TRAP 4 CPTR CHMBR N EZ INLN

## (undated) DEVICE — FORCEP BIOPSY RJ4 LG CAP W/ND

## (undated) DEVICE — SNARE CAPTIFLEX STD OVAL OLY

## (undated) DEVICE — 10FT COMBINED O2 DELIVERY/CO2 MONITORING. FILTER WITH MICROSTREAM TYPE LUER: Brand: DUAL ADULT NASAL CANNULA

## (undated) DEVICE — KIT VLV 5 PC AIR H2O SUCT BX ENDOGATOR CONN

## (undated) DEVICE — MEDI-VAC NON-CONDUCTIVE SUCTION TUBING: Brand: CARDINAL HEALTH

## (undated) DEVICE — BITEBLOCK ENDOSCP 60FR MAXI STRP

## (undated) DEVICE — ENDOSCOPY PACK - LOWER: Brand: MEDLINE INDUSTRIES, INC.

## (undated) DEVICE — MEDI-VAC SUCTION HANDLE REGULAR CAPACITY: Brand: CARDINAL HEALTH

## (undated) DEVICE — KIT CUSTOM ENDOPROCEDURE STERIS

## (undated) NOTE — MR AVS SNAPSHOT
After Visit Summary   1/24/2017    Celso Phoenix    MRN: [id number]           Diagnoses this Visit     [facility]    -  Primary     Leukopenia, unspecified type         Elevated serum immunoglobulin free light chains         Elevated WITH PLATELET.   Procedure                               Abnormality         Status                     ---------                               -----------         ------                     CBC W/ DIFFERENTIAL[[id number]]          Abnormal            Final Too small to quantitate. Immunofixation     Final    Monoclonal IgM lambda. If clinically indicated, 24 hour urine monoclonal  protein studies is suggested. Reviewed by Lemuel Robetrs M.D.  Pathology 01/26/2017 at 4:53 PM        [place] [employer] Summaries. If you've been to the Emergency Department or your doctor's office, you can view your past visit information in [employer] by going to Visits < Visit Summaries. [employer] questions? Call [phone] for help.   [employer] is NOT to be used for urge

## (undated) NOTE — LETTER
Consent to Procedure/Sedation    Date: 4/4/2018    Time: 0930    1. I authorize the performance upon Denver Phoenix the following:    Transesophageal Echocardiogram    2.  I authorize Dr. Jennifer Thompson (and whomever is designated as the doctor’s assistant), to Jah ___________________________    ___________________    Witness: ____________________     Date: ______________    Printed: 2018   9:39 AM    Patient Name: Lin Phoenix        : 6/10/1950       Medical Record #: [id number]

## (undated) NOTE — LETTER
Consent to Procedure/Sedation    Date: 4/4/2018    Time:1030    1. I authorize the performance upon Tigre Phoenix the following:  Transesophageal Echocardiogram      2.  I authorize Dr. Jami King (and whomever is designated as the doctor’s assistant), to [place] ___________________________    ___________________    Witness: ____________________     Date: ______________    Printed: 2018   10:30 AM    Patient Name: Harpreet Phoenix        : 6/10/1950       Medical Record #: [id number]

## (undated) NOTE — ED AVS SNAPSHOT
Kosta Syed   MRN: [id number]    Department:  [facility] Emergency Department   Date of Visit:  7/14/2018           Disclosure     Insurance plans vary and the physician(s) referred by the ER may not be covered by your plan.  Please contact yo tell this physician (or your personal doctor if your instructions are to return to your personal doctor) about any new or lasting problems. The primary care or specialist physician will see patients referred from the [facility] Emergency Department.  Petros Berkowitz

## (undated) NOTE — MR AVS SNAPSHOT
[address] [place] [address] [id number] [phone]               Thank you for choosing us for your health care visit with Nga Baumann DO.   We are glad to serve you and happy to provide you with th Thursday March 30, 2017     Imaging:  XR SHOULDER, COMPLETE (MIN 2 VIEWS), RIGHT (CPT=[id number])    Instructions: To schedule an appointment for your radiology test please call Zoey Rowe [facility] Scheduling at [phone].          Referral Details [facility] in [facility] in [facility]   [address] [facility], [address]: [phone]    [facility]: [address] [place] [address] covered, or covered at this frequency, by your insurer. Please check with your insurance carrier before scheduling to verify coverage.    PREVENTATIVE SERVICES  INDICATIONS AND SCHEDULE Internal Lab or Procedure External Lab or Procedure   Diabetes Screenin Colonoscopy Screen   Covered every 10 years- more often if abnormal Colonoscopy,10 Years due on 02/16/2020 Update Health Maintenance if applicable    Flex Sigmoidoscopy Screen  Covered every 5 years No results found for this or any previous visit.  No flow today Please get once after your 65th birthday    Pneumococcal 23 (Pneumovax)  Covered Once after 65 No orders found for this or any previous visit. next years Please get once after your 65th birthday    Hepatitis B for Moderate/High Risk       No orders fo BP Pulse Temp Height Weight BMI    126/74 mmHg 71 98.3 °F (36.8 °C) (Oral) 60.25\" 115 lb 12.8 oz 22.44 kg/m2         Current Medications          This list is accurate as of: 3/30/17 12:55 PM.  Always use your most recent med list.                ADVIL 2 DASH eating plan Adopt a diet rich in fruits, vegetables, and low fat dairy products with reduced content of saturated and total fat.    Dietary sodium reduction Reduce dietary sodium intake to <= 100 mmol per day (2.4 g sodium or 6 g sodium chloride)   Aer

## (undated) NOTE — LETTER
April 5, 2018        Evelyn Phoenix  [address]      Dear Ansley Sosa:    I am the Nurse Care Manager with Dr. Ozzy Grey office. Just reaching out to see how you are doing since your discharge home.    When you have a minute wou

## (undated) NOTE — Clinical Note
[facility]  A. MalorieierGeorgette Mckoy  [id number]    Advance Beneficiary Notice of Noncoverage (ABN)    Note:  If Medicare doesn't pay for D. PREVNAR 13   below, you may have to pay.   Medicare does not pay for everything, even so Option 3:  I don't want the D.    listed above. I understand with this choice I am not responsible for payment, and I cannot appeal to see if Medicare would pay. H.  Additional Information:     This notice gives our opinion, not an official Medicare de

## (undated) NOTE — Clinical Note
FYI, TCM call made, see notes. NCM attempted to schedule TCM HFU patient states she will all herself to schedule. Message sent to MD's office.

## (undated) NOTE — LETTER
AUTHORIZATION FOR SURGICAL OPERATION OR OTHER PROCEDURE    1.  I hereby authorize Dr. Neal Giordano, and [facility] staff assigned to my case to perform the following operation and/or procedure at the [facility]:    ________________________________ Time:  ________ A. M.  P.M.        Patient Name:  ______________________________________________________  (please print)      Patient signature:  ___________________________________________________             Relationship to Patient:           []  Parent